# Patient Record
Sex: MALE | Race: WHITE | NOT HISPANIC OR LATINO | Employment: OTHER | URBAN - METROPOLITAN AREA
[De-identification: names, ages, dates, MRNs, and addresses within clinical notes are randomized per-mention and may not be internally consistent; named-entity substitution may affect disease eponyms.]

---

## 2017-04-28 ENCOUNTER — OFFICE VISIT - HEALTHEAST (OUTPATIENT)
Dept: FAMILY MEDICINE | Facility: CLINIC | Age: 67
End: 2017-04-28

## 2017-04-28 DIAGNOSIS — H61.23 CERUMINOSIS, BILATERAL: ICD-10-CM

## 2017-04-28 DIAGNOSIS — R73.09 OTHER ABNORMAL GLUCOSE: ICD-10-CM

## 2017-04-28 DIAGNOSIS — N40.0 BPH (BENIGN PROSTATIC HYPERPLASIA): ICD-10-CM

## 2017-04-28 DIAGNOSIS — Z00.00 PHYSICAL EXAM: ICD-10-CM

## 2017-04-28 DIAGNOSIS — H16.139 ACTINIC KERATITIS: ICD-10-CM

## 2017-04-28 DIAGNOSIS — M50.30 DEGENERATIVE CERVICAL DISC: ICD-10-CM

## 2017-04-28 LAB
CHOLEST SERPL-MCNC: 189 MG/DL
FASTING STATUS PATIENT QL REPORTED: YES
HDLC SERPL-MCNC: 73 MG/DL
LDLC SERPL CALC-MCNC: 105 MG/DL
PSA SERPL-MCNC: 1.6 NG/ML (ref 0–4.5)
TRIGL SERPL-MCNC: 54 MG/DL

## 2017-04-28 ASSESSMENT — MIFFLIN-ST. JEOR: SCORE: 1361.81

## 2017-05-01 LAB — HCV AB SERPL QL IA: NEGATIVE

## 2017-05-03 ENCOUNTER — COMMUNICATION - HEALTHEAST (OUTPATIENT)
Dept: FAMILY MEDICINE | Facility: CLINIC | Age: 67
End: 2017-05-03

## 2017-06-06 ENCOUNTER — OFFICE VISIT - HEALTHEAST (OUTPATIENT)
Dept: FAMILY MEDICINE | Facility: CLINIC | Age: 67
End: 2017-06-06

## 2017-06-06 DIAGNOSIS — M79.644 THUMB PAIN, RIGHT: ICD-10-CM

## 2017-06-06 ASSESSMENT — MIFFLIN-ST. JEOR: SCORE: 1364.99

## 2017-06-21 ENCOUNTER — HOSPITAL ENCOUNTER (OUTPATIENT)
Dept: RADIOLOGY | Facility: CLINIC | Age: 67
Discharge: HOME | End: 2017-06-21

## 2017-06-21 DIAGNOSIS — M79.644 THUMB PAIN, RIGHT: ICD-10-CM

## 2017-06-22 ENCOUNTER — COMMUNICATION - HEALTHEAST (OUTPATIENT)
Dept: FAMILY MEDICINE | Facility: CLINIC | Age: 67
End: 2017-06-22

## 2017-06-26 ENCOUNTER — COMMUNICATION - HEALTHEAST (OUTPATIENT)
Dept: FAMILY MEDICINE | Facility: CLINIC | Age: 67
End: 2017-06-26

## 2017-06-28 ENCOUNTER — OFFICE VISIT - HEALTHEAST (OUTPATIENT)
Dept: FAMILY MEDICINE | Facility: CLINIC | Age: 67
End: 2017-06-28

## 2017-06-28 DIAGNOSIS — M79.644 THUMB PAIN, RIGHT: ICD-10-CM

## 2017-06-28 ASSESSMENT — MIFFLIN-ST. JEOR: SCORE: 1356.82

## 2017-07-10 ENCOUNTER — RECORDS - HEALTHEAST (OUTPATIENT)
Dept: ADMINISTRATIVE | Facility: OTHER | Age: 67
End: 2017-07-10

## 2017-07-17 ENCOUNTER — RECORDS - HEALTHEAST (OUTPATIENT)
Dept: ADMINISTRATIVE | Facility: OTHER | Age: 67
End: 2017-07-17

## 2017-09-28 ENCOUNTER — AMBULATORY - HEALTHEAST (OUTPATIENT)
Dept: NURSING | Facility: CLINIC | Age: 67
End: 2017-09-28

## 2017-12-06 ENCOUNTER — COMMUNICATION - HEALTHEAST (OUTPATIENT)
Dept: TELEHEALTH | Facility: CLINIC | Age: 67
End: 2017-12-06

## 2017-12-06 ENCOUNTER — OFFICE VISIT - HEALTHEAST (OUTPATIENT)
Dept: FAMILY MEDICINE | Facility: CLINIC | Age: 67
End: 2017-12-06

## 2017-12-06 DIAGNOSIS — D48.9 NEOPLASM OF UNCERTAIN BEHAVIOR: ICD-10-CM

## 2017-12-06 DIAGNOSIS — R10.31 GROIN PAIN, RIGHT: ICD-10-CM

## 2017-12-11 LAB
LAB AP CHARGES (HE HISTORICAL CONVERSION): NORMAL
PATH REPORT.COMMENTS IMP SPEC: NORMAL
PATH REPORT.FINAL DX SPEC: NORMAL
PATH REPORT.GROSS SPEC: NORMAL
PATH REPORT.MICROSCOPIC SPEC OTHER STN: NORMAL
PATH REPORT.RELEVANT HX SPEC: NORMAL
RESULT FLAG (HE HISTORICAL CONVERSION): NORMAL

## 2017-12-12 ENCOUNTER — COMMUNICATION - HEALTHEAST (OUTPATIENT)
Dept: FAMILY MEDICINE | Facility: CLINIC | Age: 67
End: 2017-12-12

## 2017-12-14 ENCOUNTER — HOSPITAL ENCOUNTER (OUTPATIENT)
Dept: CT IMAGING | Facility: CLINIC | Age: 67
Discharge: HOME | End: 2017-12-14
Attending: FAMILY MEDICINE

## 2017-12-14 DIAGNOSIS — R10.31 GROIN PAIN, RIGHT: ICD-10-CM

## 2017-12-15 ENCOUNTER — COMMUNICATION - HEALTHEAST (OUTPATIENT)
Dept: FAMILY MEDICINE | Facility: CLINIC | Age: 67
End: 2017-12-15

## 2018-04-23 ENCOUNTER — OFFICE VISIT - HEALTHEAST (OUTPATIENT)
Dept: FAMILY MEDICINE | Facility: CLINIC | Age: 68
End: 2018-04-23

## 2018-04-23 ENCOUNTER — COMMUNICATION - HEALTHEAST (OUTPATIENT)
Dept: FAMILY MEDICINE | Facility: CLINIC | Age: 68
End: 2018-04-23

## 2018-04-23 DIAGNOSIS — Z12.5 SCREENING FOR PROSTATE CANCER: ICD-10-CM

## 2018-04-23 DIAGNOSIS — M50.30 DEGENERATIVE CERVICAL DISC: ICD-10-CM

## 2018-04-23 DIAGNOSIS — Z00.00 WELLNESS EXAMINATION: ICD-10-CM

## 2018-04-23 DIAGNOSIS — H16.139 ACTINIC KERATITIS: ICD-10-CM

## 2018-04-23 DIAGNOSIS — N40.0 BPH (BENIGN PROSTATIC HYPERPLASIA): ICD-10-CM

## 2018-04-23 DIAGNOSIS — R73.09 OTHER ABNORMAL GLUCOSE: ICD-10-CM

## 2018-04-23 LAB
ALBUMIN SERPL-MCNC: 4.3 G/DL (ref 3.5–5)
ALP SERPL-CCNC: 96 U/L (ref 45–120)
ALT SERPL W P-5'-P-CCNC: 40 U/L (ref 0–45)
ANION GAP SERPL CALCULATED.3IONS-SCNC: 15 MMOL/L (ref 5–18)
AST SERPL W P-5'-P-CCNC: 32 U/L (ref 0–40)
BILIRUB SERPL-MCNC: 1 MG/DL (ref 0–1)
BUN SERPL-MCNC: 16 MG/DL (ref 8–22)
CALCIUM SERPL-MCNC: 9.9 MG/DL (ref 8.5–10.5)
CHLORIDE BLD-SCNC: 103 MMOL/L (ref 98–107)
CHOLEST SERPL-MCNC: 223 MG/DL
CO2 SERPL-SCNC: 24 MMOL/L (ref 22–31)
CREAT SERPL-MCNC: 0.82 MG/DL (ref 0.7–1.3)
FASTING STATUS PATIENT QL REPORTED: YES
GFR SERPL CREATININE-BSD FRML MDRD: >60 ML/MIN/1.73M2
GLUCOSE BLD-MCNC: 73 MG/DL (ref 70–125)
HDLC SERPL-MCNC: 83 MG/DL
LDLC SERPL CALC-MCNC: 115 MG/DL
POTASSIUM BLD-SCNC: 4.8 MMOL/L (ref 3.5–5)
PROT SERPL-MCNC: 8 G/DL (ref 6–8)
PSA SERPL-MCNC: 1.7 NG/ML (ref 0–4.5)
SODIUM SERPL-SCNC: 142 MMOL/L (ref 136–145)
TRIGL SERPL-MCNC: 126 MG/DL

## 2018-04-23 ASSESSMENT — MIFFLIN-ST. JEOR: SCORE: 1322.12

## 2018-06-04 ENCOUNTER — COMMUNICATION - HEALTHEAST (OUTPATIENT)
Dept: PEDIATRICS | Facility: CLINIC | Age: 68
End: 2018-06-04

## 2018-06-04 ENCOUNTER — AMBULATORY - HEALTHEAST (OUTPATIENT)
Dept: NURSING | Facility: CLINIC | Age: 68
End: 2018-06-04

## 2018-06-04 DIAGNOSIS — Z23 IMMUNIZATION DUE: ICD-10-CM

## 2018-08-08 ENCOUNTER — OFFICE VISIT - HEALTHEAST (OUTPATIENT)
Dept: FAMILY MEDICINE | Facility: CLINIC | Age: 68
End: 2018-08-08

## 2018-08-08 DIAGNOSIS — M25.561 RIGHT KNEE PAIN: ICD-10-CM

## 2018-09-06 ENCOUNTER — OFFICE VISIT - HEALTHEAST (OUTPATIENT)
Dept: PHYSICAL THERAPY | Facility: REHABILITATION | Age: 68
End: 2018-09-06

## 2018-09-06 DIAGNOSIS — M25.561 ACUTE PAIN OF RIGHT KNEE: ICD-10-CM

## 2018-09-06 DIAGNOSIS — M25.562 ACUTE PAIN OF LEFT KNEE: ICD-10-CM

## 2018-09-06 DIAGNOSIS — R53.1 DECREASED STRENGTH: ICD-10-CM

## 2018-09-06 DIAGNOSIS — M25.661 KNEE STIFFNESS, RIGHT: ICD-10-CM

## 2018-09-10 ENCOUNTER — OFFICE VISIT - HEALTHEAST (OUTPATIENT)
Dept: PHYSICAL THERAPY | Facility: REHABILITATION | Age: 68
End: 2018-09-10

## 2018-09-10 DIAGNOSIS — R53.1 DECREASED STRENGTH: ICD-10-CM

## 2018-09-10 DIAGNOSIS — M25.661 KNEE STIFFNESS, RIGHT: ICD-10-CM

## 2018-09-10 DIAGNOSIS — M25.562 ACUTE PAIN OF LEFT KNEE: ICD-10-CM

## 2018-09-10 DIAGNOSIS — M25.561 ACUTE PAIN OF RIGHT KNEE: ICD-10-CM

## 2018-09-19 ENCOUNTER — OFFICE VISIT - HEALTHEAST (OUTPATIENT)
Dept: PHYSICAL THERAPY | Facility: REHABILITATION | Age: 68
End: 2018-09-19

## 2018-09-19 DIAGNOSIS — M25.561 ACUTE PAIN OF RIGHT KNEE: ICD-10-CM

## 2018-09-19 DIAGNOSIS — R53.1 DECREASED STRENGTH: ICD-10-CM

## 2018-09-19 DIAGNOSIS — M25.562 ACUTE PAIN OF LEFT KNEE: ICD-10-CM

## 2018-09-19 DIAGNOSIS — M25.661 KNEE STIFFNESS, RIGHT: ICD-10-CM

## 2019-04-24 ENCOUNTER — COMMUNICATION - HEALTHEAST (OUTPATIENT)
Dept: FAMILY MEDICINE | Facility: CLINIC | Age: 69
End: 2019-04-24

## 2019-04-24 ENCOUNTER — OFFICE VISIT - HEALTHEAST (OUTPATIENT)
Dept: FAMILY MEDICINE | Facility: CLINIC | Age: 69
End: 2019-04-24

## 2019-04-24 DIAGNOSIS — Z00.00 WELLNESS EXAMINATION: ICD-10-CM

## 2019-04-24 DIAGNOSIS — R73.09 OTHER ABNORMAL GLUCOSE: ICD-10-CM

## 2019-04-24 DIAGNOSIS — N40.1 BENIGN PROSTATIC HYPERPLASIA WITH LOWER URINARY TRACT SYMPTOMS, SYMPTOM DETAILS UNSPECIFIED: ICD-10-CM

## 2019-04-24 DIAGNOSIS — I10 HYPERTENSION, UNSPECIFIED TYPE: ICD-10-CM

## 2019-04-24 DIAGNOSIS — H16.139 ACTINIC KERATITIS, UNSPECIFIED LATERALITY: ICD-10-CM

## 2019-04-24 DIAGNOSIS — M50.30 DEGENERATIVE CERVICAL DISC: ICD-10-CM

## 2019-04-24 LAB
ANION GAP SERPL CALCULATED.3IONS-SCNC: 10 MMOL/L (ref 5–18)
BUN SERPL-MCNC: 12 MG/DL (ref 8–28)
CALCIUM SERPL-MCNC: 10.4 MG/DL (ref 8.5–10.5)
CHLORIDE BLD-SCNC: 105 MMOL/L (ref 98–107)
CHOLEST SERPL-MCNC: 203 MG/DL
CO2 SERPL-SCNC: 28 MMOL/L (ref 22–31)
CREAT SERPL-MCNC: 0.94 MG/DL (ref 0.7–1.3)
FASTING STATUS PATIENT QL REPORTED: YES
GFR SERPL CREATININE-BSD FRML MDRD: >60 ML/MIN/1.73M2
GLUCOSE BLD-MCNC: 100 MG/DL (ref 70–125)
HDLC SERPL-MCNC: 88 MG/DL
LDLC SERPL CALC-MCNC: 105 MG/DL
POTASSIUM BLD-SCNC: 4.5 MMOL/L (ref 3.5–5)
SODIUM SERPL-SCNC: 143 MMOL/L (ref 136–145)
TRIGL SERPL-MCNC: 52 MG/DL

## 2019-04-24 ASSESSMENT — MIFFLIN-ST. JEOR: SCORE: 1314.18

## 2019-05-22 ENCOUNTER — OFFICE VISIT - HEALTHEAST (OUTPATIENT)
Dept: FAMILY MEDICINE | Facility: CLINIC | Age: 69
End: 2019-05-22

## 2019-05-22 DIAGNOSIS — R45.4 DIFFICULTY CONTROLLING ANGER: ICD-10-CM

## 2019-05-22 DIAGNOSIS — I10 HYPERTENSION, UNSPECIFIED TYPE: ICD-10-CM

## 2019-06-17 ENCOUNTER — OFFICE VISIT - HEALTHEAST (OUTPATIENT)
Dept: FAMILY MEDICINE | Facility: CLINIC | Age: 69
End: 2019-06-17

## 2019-06-17 DIAGNOSIS — I10 HYPERTENSION, UNSPECIFIED TYPE: ICD-10-CM

## 2019-06-17 DIAGNOSIS — R42 DIZZINESS: ICD-10-CM

## 2019-09-04 ENCOUNTER — COMMUNICATION - HEALTHEAST (OUTPATIENT)
Dept: FAMILY MEDICINE | Facility: CLINIC | Age: 69
End: 2019-09-04

## 2019-09-04 ENCOUNTER — OFFICE VISIT - HEALTHEAST (OUTPATIENT)
Dept: FAMILY MEDICINE | Facility: CLINIC | Age: 69
End: 2019-09-04

## 2019-09-04 DIAGNOSIS — I10 HYPERTENSION, UNSPECIFIED TYPE: ICD-10-CM

## 2019-09-04 LAB
ANION GAP SERPL CALCULATED.3IONS-SCNC: 10 MMOL/L (ref 5–18)
BUN SERPL-MCNC: 11 MG/DL (ref 8–28)
CALCIUM SERPL-MCNC: 10 MG/DL (ref 8.5–10.5)
CHLORIDE BLD-SCNC: 105 MMOL/L (ref 98–107)
CO2 SERPL-SCNC: 28 MMOL/L (ref 22–31)
CREAT SERPL-MCNC: 0.89 MG/DL (ref 0.7–1.3)
GFR SERPL CREATININE-BSD FRML MDRD: >60 ML/MIN/1.73M2
GLUCOSE BLD-MCNC: 164 MG/DL (ref 70–125)
POTASSIUM BLD-SCNC: 4.2 MMOL/L (ref 3.5–5)
SODIUM SERPL-SCNC: 143 MMOL/L (ref 136–145)

## 2020-04-14 ENCOUNTER — COMMUNICATION - HEALTHEAST (OUTPATIENT)
Dept: FAMILY MEDICINE | Facility: CLINIC | Age: 70
End: 2020-04-14

## 2020-05-14 ENCOUNTER — COMMUNICATION - HEALTHEAST (OUTPATIENT)
Dept: FAMILY MEDICINE | Facility: CLINIC | Age: 70
End: 2020-05-14

## 2020-05-19 ENCOUNTER — OFFICE VISIT - HEALTHEAST (OUTPATIENT)
Dept: FAMILY MEDICINE | Facility: CLINIC | Age: 70
End: 2020-05-19

## 2020-05-19 DIAGNOSIS — R41.3 MEMORY DEFICIT: ICD-10-CM

## 2020-05-19 DIAGNOSIS — I10 HYPERTENSION, UNSPECIFIED TYPE: ICD-10-CM

## 2020-05-19 ASSESSMENT — PATIENT HEALTH QUESTIONNAIRE - PHQ9: SUM OF ALL RESPONSES TO PHQ QUESTIONS 1-9: 0

## 2020-06-01 ENCOUNTER — COMMUNICATION - HEALTHEAST (OUTPATIENT)
Dept: FAMILY MEDICINE | Facility: CLINIC | Age: 70
End: 2020-06-01

## 2020-06-11 ENCOUNTER — COMMUNICATION - HEALTHEAST (OUTPATIENT)
Dept: FAMILY MEDICINE | Facility: CLINIC | Age: 70
End: 2020-06-11

## 2020-06-12 ENCOUNTER — OFFICE VISIT (OUTPATIENT)
Dept: NEUROLOGY | Facility: CLINIC | Age: 70
End: 2020-06-12
Payer: COMMERCIAL

## 2020-06-12 ENCOUNTER — RECORDS - HEALTHEAST (OUTPATIENT)
Dept: ADMINISTRATIVE | Facility: OTHER | Age: 70
End: 2020-06-12

## 2020-06-12 ENCOUNTER — TELEPHONE (OUTPATIENT)
Dept: NEUROLOGY | Facility: CLINIC | Age: 70
End: 2020-06-12

## 2020-06-12 VITALS — HEIGHT: 64 IN | BODY MASS INDEX: 24.75 KG/M2 | WEIGHT: 145 LBS

## 2020-06-12 DIAGNOSIS — R41.3 MEMORY DIFFICULTY: Primary | ICD-10-CM

## 2020-06-12 PROCEDURE — 99214 OFFICE O/P EST MOD 30 MIN: CPT | Mod: 95 | Performed by: PSYCHIATRY & NEUROLOGY

## 2020-06-12 RX ORDER — LISINOPRIL 10 MG/1
10 TABLET ORAL DAILY
COMMUNITY
Start: 2019-09-04 | End: 2021-08-02 | Stop reason: DRUGHIGH

## 2020-06-12 ASSESSMENT — MIFFLIN-ST. JEOR: SCORE: 1323.72

## 2020-06-12 NOTE — NURSING NOTE
Pt. wife Araceli will be present for appointment  Computer video visit  [email or web address]  Chief Complaint   Patient presents with     Memory Loss     Zoe Patel on 6/12/2020 at 9:02 AM

## 2020-06-12 NOTE — TELEPHONE ENCOUNTER
I left message at patient home number asking he return call to discuss proceedure for scheduling testing Dr. Montenegro ordered after appointment today.  Zoe Patel on 6/12/2020 at 10:55 AM

## 2020-06-12 NOTE — PATIENT INSTRUCTIONS
We will check blood work for conditions that can cause memory difficulties.  We will also plan an MRI scan of the brain to make sure there is been no evidence of stroke, hemorrhage or mass lesion that might cause memory difficulties and behavioral changes.  I do have a concern that your alcohol consumption could be playing a role from a behavioral standpoint and also memory and should discuss this with Dr. Wu.  We will arrange a follow-up evaluation in approximately 2-3 weeks time after the above testing has been done and we will see if we can do a virtual formal memory test.

## 2020-06-12 NOTE — LETTER
6/12/2020         RE: Nate North  [address] [facility] [place] [address]        Dear Colleague,    Thank you for referring your patient, Nate North, to the [facility] NEUROLOGY [place]. Please see a copy of my visit note below.        Nate North  Age:69 year old  MRN [id number]  PCP Juan Wu    Consult requested by: Juan Wu  Regarding: Memory difficulties    Allergies: Patient has no known allergies.  Medications:  Current Outpatient Medications   Medication Sig Dispense Refill     lisinopril (ZESTRIL) 10 MG tablet Take 10 mg by mouth daily          History of Present Illness: A video encounter was attempted but was unable to make a connection.  Therefore, a telephone encounter was done today in lieu of office visit due to the COVID-19 pandemic.  Patient was agreeable to this type of encounter.  This is a 69-year-old male seen at the request of Dr. Wu regarding memory and behavioral issues.  Patient had been seeing a therapist at [facility] regarding anger issues and depression.  Therapist thought he did not have depression problems.  They saw another therapist (patient and his wife) and his wife had reported to him there were personality changes that have occurred and he can fly off the handle quite readily and has anxiety.  Patient does report memory difficulties.  He does need to take notes.  He does not get lost driving.  He has not had any difficulty with paying of bills.  He did report that he had had a concern that he may not have paid the property taxes but had paid it early.  He has had difficulty with doing taxes which he was able to do in the past.  There have been no hallucinations.  He does report getting a headache every morning.  No loss of vision or double vision.  There are no symptoms of a REM behavior disorder by patient report.  No weakness or numbness problems.  No speech or swallowing difficulties.  No difficulty with walking.  He  does have urinary frequency going 6 times a day, but no incontinence.  There is no family history of dementia.  There is no history of head injuries.  No history of meningitis or encephalitis.  There is no history of stroke.  He had worked at  for a 35-year period of time as a .  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  He does have significant alcohol intake.  He does report drinking a beer for lunch.  He reports drinking 3 highballs between 3 and 6 PM and then a Manhattan or a tim martini at bedtime.  His wife has told him that the changes that have occurred of had a significant impact upon their marriage.  No apparent apnea from 1 patient reports at night.        PAST ILLNESSES:   Medical: Active Problems    Problem Noted Date   Hypertension 04/24/2019   Overview:     Dx April 2019  Several values high  Lisinopril, May 2019       Actinic keratitis 04/27/2016   Overview:     Dx April 2016  Left forehead     Degenerative cervical disc     Overview:     Per records-don't have MRI report (not sure if done)  Left arm numbness-couple x per week       BPH (benign prostatic hyperplasia)     Overview:     Frequent urination, some urgency, loss of stream no nocturia       Prediabetes     Overview:     fasting 107, 100   Medical History    Medical History Date Comments   Anxiety   Work related. Minor attacks lasting 10-15 seconds. Substantial improvement since detention. Sense of disconnectedness          Surgical:  Surgical History    Surgery Date Site/Laterality Comments   ROTATOR CUFF REPAIR 1/1/2007 - 12/31/2007 Right      INGUINAL HERNIA REPAIR   Right      VASECTOMY 1/1/1997 - 12/31/1997         SOCIAL:   Social History     Socioeconomic History     Marital status:      Spouse name: Not on file     Number of children: Not on file     Years of education: Not on file     Highest education level: Not on file   Occupational History     Not on  file   Social Needs     Financial resource strain: Not on file     Food insecurity     Worry: Not on file     Inability: Not on file     Transportation needs     Medical: Not on file     Non-medical: Not on file   Tobacco Use     Smoking status: Current Some Day Smoker     Types: Cigars     Smokeless tobacco: Never Used     Tobacco comment: 1 cigar per year   Substance and Sexual Activity     Alcohol use: Yes     Comment: Daily     Drug use: Never     Sexual activity: Not on file   Lifestyle     Physical activity     Days per week: Not on file     Minutes per session: Not on file     Stress: Not on file   Relationships     Social connections     Talks on phone: Not on file     Gets together: Not on file     Attends Yarsanism service: Not on file     Active member of club or organization: Not on file     Attends meetings of clubs or organizations: Not on file     Relationship status: Not on file     Intimate partner violence     Fear of current or ex partner: Not on file     Emotionally abused: Not on file     Physically abused: Not on file     Forced sexual activity: Not on file   Other Topics Concern     Parent/sibling w/ CABG, MI or angioplasty before 65F 55M? Not Asked   Social History Narrative     Not on file     Employment: Retired.  Had worked for [employer] for 35-year period of time at work part-time until 2015.    FAMILY HISTORY: Father lived to be 93 and  of renal failure.  Mother lived to be 75 and  of ovarian cancer.  Family History   Problem Relation Age of Onset     Cancer Mother      :                        REVIEW OF SYSTEMS  Constitutional: Negative for fever, chills, weight loss/weight gain.  Skin: Had an actinic keratoses on his left forehead.  HEENT: No loss of vision or double vision.  Positive hearing loss and tinnitus  Respiratory: Negative shortness of breath or cough  Cardiovascular: No chest pain or rapid heart rate.  Gastrointestinal: No abdominal pain, blood in stool,  diarrhea/constipation  Genitourinary: Urinary frequency-6 times a day.  No incontinence  Musculoskeletal: Does have pain in both shoulders.  Neurologic: See above.  Psychiatric: Question of depression by patient report.  History of anxiety  Hematologic/Lymphatic/Immunologic: Negative.  Endocrine: No polydipsia.  No hot/cold intolerance        PHYSICAL EXAMINATION: Unable- telephone encounter    :     NEUROLOGIC EXAMINATION:  Mental Status: Oriented x3.  Can tell me the name of the president and the governor.  He can spell world forward and backwards.  He could repeat a phrase.  His speech sounded fluent.  Registers 3/3 objects.  Remembered 3/3 objects at 10 minutes time.            IMPRESSION: Memory difficulties/behavioral issues.  One concern we discussed is that he does have a significant amount of alcohol consumption and he should discuss this with Dr. Wu.  His short-term memory was normal on testing and is able to I am going to have him get an MRI scan of the we will also plan to check lab work in terms of a B12, TSH, ammonia level and a thiamine level.  We will plan follow-up after that testing has been done concerns could include the possibility of a frontal temporal dementia, given description of behavioral changes.  He does not have any visual hallucinations or symptoms of the room savior disorder these.  Neuropsychometric testing may be another consideration will first see what the above studies demonstrate.  We will plan follow-up after having the above testing done  Start of telephone encounter: 10 AM  End of encounter: 10:29 AM  Ger Montenegro MD, MD    Again, thank you for allowing me to participate in the care of your patient.        Sincerely,        Ger Montenegro MD, MD

## 2020-06-15 ENCOUNTER — RECORDS - HEALTHEAST (OUTPATIENT)
Dept: ADMINISTRATIVE | Facility: OTHER | Age: 70
End: 2020-06-15

## 2020-06-15 NOTE — TELEPHONE ENCOUNTER
I left message with patients wife that I would mail copy of orders to patient's home and asked he call back if there are any questions.  Zoe Patel on 6/15/2020 at 12:38 PM

## 2020-06-15 NOTE — PROGRESS NOTES
Nate North  Age:69 year old  MRN [id number]  PCP Juan Wu    Consult requested by: Juan Wu  Regarding: Memory difficulties    Allergies: Patient has no known allergies.  Medications:  Current Outpatient Medications   Medication Sig Dispense Refill     lisinopril (ZESTRIL) 10 MG tablet Take 10 mg by mouth daily          History of Present Illness: A video encounter was attempted but was unable to make a connection.  Therefore, a telephone encounter was done today in lieu of office visit due to the COVID-19 pandemic.  Patient was agreeable to this type of encounter.  This is a 69-year-old male seen at the request of Dr. Wu regarding memory and behavioral issues.  Patient had been seeing a therapist at [facility] regarding anger issues and depression.  Therapist thought he did not have depression problems.  They saw another therapist (patient and his wife) and his wife had reported to him there were personality changes that have occurred and he can fly off the handle quite readily and has anxiety.  Patient does report memory difficulties.  He does need to take notes.  He does not get lost driving.  He has not had any difficulty with paying of bills.  He did report that he had had a concern that he may not have paid the property taxes but had paid it early.  He has had difficulty with doing taxes which he was able to do in the past.  There have been no hallucinations.  He does report getting a headache every morning.  No loss of vision or double vision.  There are no symptoms of a REM behavior disorder by patient report.  No weakness or numbness problems.  No speech or swallowing difficulties.  No difficulty with walking.  He does have urinary frequency going 6 times a day, but no incontinence.  There is no family history of dementia.  There is no history of head injuries.  No history of meningitis or encephalitis.  There is no history of stroke.  He had worked at [employer] for a 35-year  period of time as a .  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  He does have significant alcohol intake.  He does report drinking a beer for lunch.  He reports drinking 3 highballs between 3 and 6 PM and then a Manhattan or a tim martini at bedtime.  His wife has told him that the changes that have occurred of had a significant impact upon their marriage.  No apparent apnea from 1 patient reports at night.        PAST ILLNESSES:   Medical: Active Problems    Problem Noted Date   Hypertension 04/24/2019   Overview:     Dx April 2019  Several values high  Lisinopril, May 2019       Actinic keratitis 04/27/2016   Overview:     Dx April 2016  Left forehead     Degenerative cervical disc     Overview:     Per records-don't have MRI report (not sure if done)  Left arm numbness-couple x per week       BPH (benign prostatic hyperplasia)     Overview:     Frequent urination, some urgency, loss of stream no nocturia       Prediabetes     Overview:     fasting 107, 100   Medical History    Medical History Date Comments   Anxiety   Work related. Minor attacks lasting 10-15 seconds. Substantial improvement since senior living. Sense of disconnectedness          Surgical:  Surgical History    Surgery Date Site/Laterality Comments   ROTATOR CUFF REPAIR 1/1/2007 - 12/31/2007 Right      INGUINAL HERNIA REPAIR   Right      VASECTOMY 1/1/1997 - 12/31/1997         SOCIAL:   Social History     Socioeconomic History     Marital status:      Spouse name: Not on file     Number of children: Not on file     Years of education: Not on file     Highest education level: Not on file   Occupational History     Not on file   Social Needs     Financial resource strain: Not on file     Food insecurity     Worry: Not on file     Inability: Not on file     Transportation needs     Medical: Not on file     Non-medical: Not on file   Tobacco Use     Smoking status: Current Some  Day Smoker     Types: Cigars     Smokeless tobacco: Never Used     Tobacco comment: 1 cigar per year   Substance and Sexual Activity     Alcohol use: Yes     Comment: Daily     Drug use: Never     Sexual activity: Not on file   Lifestyle     Physical activity     Days per week: Not on file     Minutes per session: Not on file     Stress: Not on file   Relationships     Social connections     Talks on phone: Not on file     Gets together: Not on file     Attends [employer] service: Not on file     Active member of club or organization: Not on file     Attends meetings of clubs or organizations: Not on file     Relationship status: Not on file     Intimate partner violence     Fear of current or ex partner: Not on file     Emotionally abused: Not on file     Physically abused: Not on file     Forced sexual activity: Not on file   Other Topics Concern     Parent/sibling w/ CABG, MI or angioplasty before 65F 55M? Not Asked   Social History Narrative     Not on file     Employment: Retired.  Had worked for [employer] for 35-year period of time at work part-time until 2015.    FAMILY HISTORY: Father lived to be 93 and  of renal failure.  Mother lived to be 75 and  of ovarian cancer.  Family History   Problem Relation Age of Onset     Cancer Mother      :                        REVIEW OF SYSTEMS  Constitutional: Negative for fever, chills, weight loss/weight gain.  Skin: Had an actinic keratoses on his left forehead.  HEENT: No loss of vision or double vision.  Positive hearing loss and tinnitus  Respiratory: Negative shortness of breath or cough  Cardiovascular: No chest pain or rapid heart rate.  Gastrointestinal: No abdominal pain, blood in stool, diarrhea/constipation  Genitourinary: Urinary frequency-6 times a day.  No incontinence  Musculoskeletal: Does have pain in both shoulders.  Neurologic: See above.  Psychiatric: Question of depression by patient report.  History of anxiety  Hematologic/Lymphatic/Immunologic:  Negative.  Endocrine: No polydipsia.  No hot/cold intolerance        PHYSICAL EXAMINATION: Unable- telephone encounter    :     NEUROLOGIC EXAMINATION:  Mental Status: Oriented x3.  Can tell me the name of the president and the governor.  He can spell world forward and backwards.  He could repeat a phrase.  His speech sounded fluent.  Registers 3/3 objects.  Remembered 3/3 objects at 10 minutes time.            IMPRESSION: Memory difficulties/behavioral issues.  One concern we discussed is that he does have a significant amount of alcohol consumption and he should discuss this with Dr. Wu.  His short-term memory was normal on testing and is able to I am going to have him get an MRI scan of the we will also plan to check lab work in terms of a B12, TSH, ammonia level and a thiamine level.  We will plan follow-up after that testing has been done concerns could include the possibility of a frontal temporal dementia, given description of behavioral changes.  He does not have any visual hallucinations or symptoms of the room savior disorder these.  Neuropsychometric testing may be another consideration will first see what the above studies demonstrate.  We will plan follow-up after having the above testing done  Start of telephone encounter: 10 AM  End of encounter: 10:29 AM  Ger Montenegro MD, MD

## 2020-06-25 ENCOUNTER — HOSPITAL ENCOUNTER (OUTPATIENT)
Dept: MRI IMAGING | Facility: CLINIC | Age: 70
Discharge: HOME | End: 2020-06-25

## 2020-06-25 DIAGNOSIS — R41.3 MEMORY DIFFICULTY: ICD-10-CM

## 2020-06-25 LAB
CREAT BLD-MCNC: 0.9 MG/DL (ref 0.7–1.3)
GFR SERPL CREATININE-BSD FRML MDRD: >60 ML/MIN/1.73M2

## 2020-06-30 ENCOUNTER — AMBULATORY - HEALTHEAST (OUTPATIENT)
Dept: LAB | Facility: CLINIC | Age: 70
End: 2020-06-30

## 2020-06-30 DIAGNOSIS — R41.3 MEMORY LOSS: ICD-10-CM

## 2020-06-30 LAB
AMMONIA PLAS-SCNC: 31 UMOL/L (ref 11–35)
T4 FREE SERPL-MCNC: 0.9 NG/DL (ref 0.7–1.8)
TSH SERPL DL<=0.005 MIU/L-ACNC: 0.95 UIU/ML (ref 0.3–5)
VIT B12 SERPL-MCNC: 303 PG/ML (ref 213–816)

## 2020-07-02 LAB — VIT B1 PYROPHOSHATE BLD-SCNC: 85 NMOL/L (ref 70–180)

## 2020-07-03 ENCOUNTER — COMMUNICATION - HEALTHEAST (OUTPATIENT)
Dept: FAMILY MEDICINE | Facility: CLINIC | Age: 70
End: 2020-07-03

## 2020-07-20 ENCOUNTER — DOCUMENTATION ONLY (OUTPATIENT)
Dept: OTHER | Facility: CLINIC | Age: 70
End: 2020-07-20

## 2020-07-20 ENCOUNTER — AMBULATORY - HEALTHEAST (OUTPATIENT)
Dept: OTHER | Facility: CLINIC | Age: 70
End: 2020-07-20

## 2020-07-24 ENCOUNTER — OFFICE VISIT (OUTPATIENT)
Dept: NEUROLOGY | Facility: CLINIC | Age: 70
End: 2020-07-24
Payer: COMMERCIAL

## 2020-07-24 ENCOUNTER — RECORDS - HEALTHEAST (OUTPATIENT)
Dept: ADMINISTRATIVE | Facility: OTHER | Age: 70
End: 2020-07-24

## 2020-07-24 VITALS — WEIGHT: 145 LBS | BODY MASS INDEX: 24.75 KG/M2 | HEIGHT: 64 IN

## 2020-07-24 DIAGNOSIS — R41.3 MEMORY DIFFICULTY: Primary | ICD-10-CM

## 2020-07-24 PROCEDURE — 99213 OFFICE O/P EST LOW 20 MIN: CPT | Mod: 95 | Performed by: PSYCHIATRY & NEUROLOGY

## 2020-07-24 ASSESSMENT — MIFFLIN-ST. JEOR: SCORE: 1323.72

## 2020-07-24 ASSESSMENT — PAIN SCALES - GENERAL: PAINLEVEL: NO PAIN (0)

## 2020-07-24 NOTE — PROGRESS NOTES
"    Nate North  69 year old  MRN:[id number]  PCP: Juan Wu  No ref. provider found    No Known Allergies    Current Outpatient Medications   Medication Sig Dispense Refill     lisinopril (ZESTRIL) 10 MG tablet Take 10 mg by mouth daily        Nate North is a 69 year old male who is being evaluated via a billable video visit.      The patient has been notified of following:     \"This video visit will be conducted via a call between you and your physician/provider. We have found that certain health care needs can be provided without the need for an in-person physical exam.  This service lets us provide the care you need with a video conversation.  If a prescription is necessary we can send it directly to your pharmacy.  If lab work is needed we can place an order for that and you can then stop by our lab to have the test done at a later time.    Video visits are billed at different rates depending on your insurance coverage.  Please reach out to your insurance provider with any questions.    If during the course of the call the physician/provider feels a video visit is not appropriate, you will not be charged for this service.\"    Patient has given verbal consent for Video visit?Yes   How would you like to obtain your AVS? Mail a copy  If you are dropped from the video visit, the video invite should be resent to: Text to cell phone: [phone]  Will anyone else be joining your video visit? No        Video-Visit Details    Type of service:  Video Visit    Video Start Time: 3:58 PM  Video End Time: 4:15 PM    Originating Location (pt. Location): Home    Distant Location (provider location):  [facility] NEUROLOGY [place]     Platform used for Video Visit: [facility]          CHIEF COMPLAINT: Follow-up after MRI scan and lab results.    HISTORY SINCE LAST VISIT: A video visit was done with Mr. North today in follow-up.  His laboratory studies showed his ammonia level to be normal at 31.  " Thiamine level was normal at 85.  B12 level was in the low normal range at 303.TSH was normal at 0.95, with a free T4 that was normal at 0.9.  We went over his MRI results.  I showed them the actual images.  The MRI of the brain appears normal for age.  There is some mild atrophy but does not appear to be in excess for patient of his age.  We again talked about his alcohol consumption.  He will typically have a glass of wine or beer at lunchtime and then may have the afternoon and then a drink at bedtime.  The amount he drinks in the afternoon he reports may be variable.  We discussed the concern of excessive alcohol use can have upon memory function.    PAST MEDICAL HISTORY:   Problem Noted Date   Hypertension 04/24/2019   Overview:     Dx April 2019  Several values high  Lisinopril, May 2019       Actinic keratitis 04/27/2016   Overview:     Dx April 2016  Left forehead     Degenerative cervical disc     Overview:     Per records-don't have MRI report (not sure if done)  Left arm numbness-couple x per week       BPH (benign prostatic hyperplasia)     Overview:     Frequent urination, some urgency, loss of stream no nocturia       Prediabetes     Overview:     fasting 107, 100   Medical History     Medical History Date Comments   Anxiety   Work related. Minor attacks lasting 10-15 seconds. Substantial improvement since snf. Sense of disconnectedness             Surgical:  Surgical History     Surgery Date Site/Laterality Comments   ROTATOR CUFF REPAIR 1/1/2007 - 12/31/2007 Right      INGUINAL HERNIA REPAIR   Right      VASECTOMY 1/1/1997 - 12/31/1997          FAMILY HISTORY:  Family History   Problem Relation Age of Onset     Cancer Mother        SOCIAL HISTORY:  Social History     Socioeconomic History     Marital status:      Spouse name: Not on file     Number of children: Not on file     Years of education: Not on file     Highest education level: Not on file   Occupational History     Not on file    Social Needs     Financial resource strain: Not on file     Food insecurity     Worry: Not on file     Inability: Not on file     Transportation needs     Medical: Not on file     Non-medical: Not on file   Tobacco Use     Smoking status: Current Some Day Smoker     Types: Cigars     Smokeless tobacco: Never Used     Tobacco comment: 1 cigar per year   Substance and Sexual Activity     Alcohol use: Yes     Comment: Daily     Drug use: Never     Sexual activity: Not on file   Lifestyle     Physical activity     Days per week: Not on file     Minutes per session: Not on file     Stress: Not on file   Relationships     Social connections     Talks on phone: Not on file     Gets together: Not on file     Attends Muslim service: Not on file     Active member of club or organization: Not on file     Attends meetings of clubs or organizations: Not on file     Relationship status: Not on file     Intimate partner violence     Fear of current or ex partner: Not on file     Emotionally abused: Not on file     Physically abused: Not on file     Forced sexual activity: Not on file   Other Topics Concern     Parent/sibling w/ CABG, MI or angioplasty before 65F 55M? Not Asked   Social History Narrative     Not on file       REVIEW OF SYSTEMS:    Constitutional: No fever, chills, weight loss/weight gain.    Eyes: No loss of vision or double vision.  Ears/Nose/Throat: Positive hearing loss and tinnitus.     Neurologic: See above.  Psychiatric: Positive history of anxiety.  Hematologic/Lymphatic/Immunologic:    Endocrine:          PHYSICAL EXAMINATION:    General appearance: No acute distress.      NEUROLOGIC EXAMINATION:    Oriented x3.  Speech is fluent.  Normal naming and repetition.  Extraocular movements were full.  Visual fields are full.  Face symmetric.  Tongue midline.  No drift of arms noted.  Normal finger tapping and rapid alternating movement.      IMPRESSION: Impression: Memory difficulties.  No clear lab  abnormalities that would explain memory difficulties nor findings on MRI.  We will plan to go ahead with neuropsychometric testing, which may be done virtually.  We will plan follow-up 2-3 weeks time after memory testing has been done.  Again discussed trying to avoid excessive use of alcohol.            Ger Montenegro MD

## 2020-07-24 NOTE — LETTER
"    7/24/2020         RE: Nate North  [address] [facility] [place] [address]        Dear Colleague,    Thank you for referring your patient, Nate North, to the [facility]. Please see a copy of my visit note below.        Nate North  69 year old  MRN:[id number]  PCP: Juan Wu  No ref. provider found    No Known Allergies    Current Outpatient Medications   Medication Sig Dispense Refill     lisinopril (ZESTRIL) 10 MG tablet Take 10 mg by mouth daily        Nate North is a 69 year old male who is being evaluated via a billable video visit.      The patient has been notified of following:     \"This video visit will be conducted via a call between you and your physician/provider. We have found that certain health care needs can be provided without the need for an in-person physical exam.  This service lets us provide the care you need with a video conversation.  If a prescription is necessary we can send it directly to your pharmacy.  If lab work is needed we can place an order for that and you can then stop by our lab to have the test done at a later time.    Video visits are billed at different rates depending on your insurance coverage.  Please reach out to your insurance provider with any questions.    If during the course of the call the physician/provider feels a video visit is not appropriate, you will not be charged for this service.\"    Patient has given verbal consent for Video visit?Yes   How would you like to obtain your AVS? Mail a copy  If you are dropped from the video visit, the video invite should be resent to: Text to cell phone: [phone]  Will anyone else be joining your video visit? No        Video-Visit Details    Type of service:  Video Visit    Video Start Time: 3:58 PM  Video End Time: 4:15 PM    Originating Location (pt. Location): Home    Distant Location (provider location):  [facility]     Platform used " for Video Visit: [facility]          CHIEF COMPLAINT: Follow-up after MRI scan and lab results.    HISTORY SINCE LAST VISIT: A video visit was done with Mr. North today in follow-up.  His laboratory studies showed his ammonia level to be normal at 31.  Thiamine level was normal at 85.  B12 level was in the low normal range at 303.TSH was normal at 0.95, with a free T4 that was normal at 0.9.  We went over his MRI results.  I showed them the actual images.  The MRI of the brain appears normal for age.  There is some mild atrophy but does not appear to be in excess for patient of his age.  We again talked about his alcohol consumption.  He will typically have a glass of wine or beer at lunchtime and then may have the afternoon and then a drink at bedtime.  The amount he drinks in the afternoon he reports may be variable.  We discussed the concern of excessive alcohol use can have upon memory function.    PAST MEDICAL HISTORY:   Problem Noted Date   Hypertension 04/24/2019   Overview:     Dx April 2019  Several values high  Lisinopril, May 2019       Actinic keratitis 04/27/2016   Overview:     Dx April 2016  Left forehead     Degenerative cervical disc     Overview:     Per records-don't have MRI report (not sure if done)  Left arm numbness-couple x per week       BPH (benign prostatic hyperplasia)     Overview:     Frequent urination, some urgency, loss of stream no nocturia       Prediabetes     Overview:     fasting 107, 100   Medical History     Medical History Date Comments   Anxiety   Work related. Minor attacks lasting 10-15 seconds. Substantial improvement since prison. Sense of disconnectedness             Surgical:  Surgical History     Surgery Date Site/Laterality Comments   ROTATOR CUFF REPAIR 1/1/2007 - 12/31/2007 Right      INGUINAL HERNIA REPAIR   Right      VASECTOMY 1/1/1997 - 12/31/1997          FAMILY HISTORY:  Family History   Problem Relation Age of Onset     Cancer Mother        SOCIAL  HISTORY:  Social History     Socioeconomic History     Marital status:      Spouse name: Not on file     Number of children: Not on file     Years of education: Not on file     Highest education level: Not on file   Occupational History     Not on file   Social Needs     Financial resource strain: Not on file     Food insecurity     Worry: Not on file     Inability: Not on file     Transportation needs     Medical: Not on file     Non-medical: Not on file   Tobacco Use     Smoking status: Current Some Day Smoker     Types: Cigars     Smokeless tobacco: Never Used     Tobacco comment: 1 cigar per year   Substance and Sexual Activity     Alcohol use: Yes     Comment: Daily     Drug use: Never     Sexual activity: Not on file   Lifestyle     Physical activity     Days per week: Not on file     Minutes per session: Not on file     Stress: Not on file   Relationships     Social connections     Talks on phone: Not on file     Gets together: Not on file     Attends Jainism service: Not on file     Active member of club or organization: Not on file     Attends meetings of clubs or organizations: Not on file     Relationship status: Not on file     Intimate partner violence     Fear of current or ex partner: Not on file     Emotionally abused: Not on file     Physically abused: Not on file     Forced sexual activity: Not on file   Other Topics Concern     Parent/sibling w/ CABG, MI or angioplasty before 65F 55M? Not Asked   Social History Narrative     Not on file       REVIEW OF SYSTEMS:    Constitutional: No fever, chills, weight loss/weight gain.    Eyes: No loss of vision or double vision.  Ears/Nose/Throat: Positive hearing loss and tinnitus.     Neurologic: See above.  Psychiatric: Positive history of anxiety.  Hematologic/Lymphatic/Immunologic:    Endocrine:          PHYSICAL EXAMINATION:    General appearance: No acute distress.      NEUROLOGIC EXAMINATION:    Oriented x3.  Speech is fluent.  Normal naming  and repetition.  Extraocular movements were full.  Visual fields are full.  Face symmetric.  Tongue midline.  No drift of arms noted.  Normal finger tapping and rapid alternating movement.      IMPRESSION: Impression: Memory difficulties.  No clear lab abnormalities that would explain memory difficulties nor findings on MRI.  We will plan to go ahead with neuropsychometric testing, which may be done virtually.  We will plan follow-up 2-3 weeks time after memory testing has been done.  Again discussed trying to avoid excessive use of alcohol.            Ger Montenegro MD    Again, thank you for allowing me to participate in the care of your patient.        Sincerely,        Ger Montenegro MD, MD

## 2020-07-24 NOTE — NURSING NOTE
Chief Complaint   Patient presents with     Neurologic Problem     MRI and blood test f/u   Video smart phone [phone]

## 2020-07-24 NOTE — PATIENT INSTRUCTIONS
Your MRI of the brain appeared normal for age.  Your laboratory studies looking for causes of memory difficulties was normal.  As we discussed, I think the next step would be is to do formal neuropsychometric testing to evaluate your memory and brain function.  I will have my nurse contact you with seeing how to get that done.  We will plan follow-up approximately 2-3 weeks after those studies have been completed.

## 2020-07-27 ENCOUNTER — AMBULATORY - HEALTHEAST (OUTPATIENT)
Dept: NEUROLOGY | Facility: CLINIC | Age: 70
End: 2020-07-27

## 2020-07-27 DIAGNOSIS — R41.3 MEMORY DIFFICULTY: ICD-10-CM

## 2020-07-31 ENCOUNTER — OFFICE VISIT - HEALTHEAST (OUTPATIENT)
Dept: FAMILY MEDICINE | Facility: CLINIC | Age: 70
End: 2020-07-31

## 2020-07-31 DIAGNOSIS — N40.1 BENIGN PROSTATIC HYPERPLASIA WITH LOWER URINARY TRACT SYMPTOMS, SYMPTOM DETAILS UNSPECIFIED: ICD-10-CM

## 2020-07-31 DIAGNOSIS — H16.139 ACTINIC KERATITIS, UNSPECIFIED LATERALITY: ICD-10-CM

## 2020-07-31 DIAGNOSIS — M25.512 CHRONIC PAIN OF BOTH SHOULDERS: ICD-10-CM

## 2020-07-31 DIAGNOSIS — M25.511 CHRONIC PAIN OF BOTH SHOULDERS: ICD-10-CM

## 2020-07-31 DIAGNOSIS — Z12.5 SCREENING FOR PROSTATE CANCER: ICD-10-CM

## 2020-07-31 DIAGNOSIS — M50.30 DEGENERATIVE CERVICAL DISC: ICD-10-CM

## 2020-07-31 DIAGNOSIS — Z00.00 WELLNESS EXAMINATION: ICD-10-CM

## 2020-07-31 DIAGNOSIS — I10 HYPERTENSION, UNSPECIFIED TYPE: ICD-10-CM

## 2020-07-31 DIAGNOSIS — Z23 NEED FOR VACCINATION: ICD-10-CM

## 2020-07-31 DIAGNOSIS — F32.A DEPRESSION, UNSPECIFIED DEPRESSION TYPE: ICD-10-CM

## 2020-07-31 DIAGNOSIS — R41.3 MEMORY LOSS: ICD-10-CM

## 2020-07-31 DIAGNOSIS — G89.29 CHRONIC PAIN OF BOTH SHOULDERS: ICD-10-CM

## 2020-07-31 DIAGNOSIS — R73.09 OTHER ABNORMAL GLUCOSE: ICD-10-CM

## 2020-07-31 LAB
ANION GAP SERPL CALCULATED.3IONS-SCNC: 12 MMOL/L (ref 5–18)
BUN SERPL-MCNC: 16 MG/DL (ref 8–28)
CALCIUM SERPL-MCNC: 10.3 MG/DL (ref 8.5–10.5)
CHLORIDE BLD-SCNC: 103 MMOL/L (ref 98–107)
CHOLEST SERPL-MCNC: 196 MG/DL
CO2 SERPL-SCNC: 25 MMOL/L (ref 22–31)
CREAT SERPL-MCNC: 0.85 MG/DL (ref 0.7–1.3)
FASTING STATUS PATIENT QL REPORTED: YES
GFR SERPL CREATININE-BSD FRML MDRD: >60 ML/MIN/1.73M2
GLUCOSE BLD-MCNC: 86 MG/DL (ref 70–125)
HDLC SERPL-MCNC: 85 MG/DL
LDLC SERPL CALC-MCNC: 100 MG/DL
POTASSIUM BLD-SCNC: 5.4 MMOL/L (ref 3.5–5)
PSA SERPL-MCNC: 1.3 NG/ML (ref 0–6.5)
SODIUM SERPL-SCNC: 140 MMOL/L (ref 136–145)
TRIGL SERPL-MCNC: 56 MG/DL

## 2020-07-31 ASSESSMENT — ANXIETY QUESTIONNAIRES
1. FEELING NERVOUS, ANXIOUS, OR ON EDGE: NOT AT ALL
2. NOT BEING ABLE TO STOP OR CONTROL WORRYING: NOT AT ALL

## 2020-07-31 ASSESSMENT — MIFFLIN-ST. JEOR: SCORE: 1291.95

## 2020-08-01 ENCOUNTER — COMMUNICATION - HEALTHEAST (OUTPATIENT)
Dept: FAMILY MEDICINE | Facility: CLINIC | Age: 70
End: 2020-08-01

## 2020-08-10 ENCOUNTER — HOSPITAL ENCOUNTER (OUTPATIENT)
Dept: NEUROLOGY | Facility: CLINIC | Age: 70
Setting detail: THERAPIES SERIES
Discharge: STILL ADMITTED OR ADMINISTRATIVE | End: 2020-08-10
Attending: CLINICAL NEUROPSYCHOLOGIST

## 2020-08-10 DIAGNOSIS — G31.84 MILD COGNITIVE IMPAIRMENT, SO STATED: ICD-10-CM

## 2020-08-10 DIAGNOSIS — F43.23 ADJUSTMENT DISORDER WITH MIXED ANXIETY AND DEPRESSED MOOD: ICD-10-CM

## 2020-08-17 ENCOUNTER — OFFICE VISIT - HEALTHEAST (OUTPATIENT)
Dept: PHYSICAL THERAPY | Facility: REHABILITATION | Age: 70
End: 2020-08-17

## 2020-08-17 DIAGNOSIS — M25.552 HIP PAIN, BILATERAL: ICD-10-CM

## 2020-08-17 DIAGNOSIS — M25.512 PAIN OF BOTH SHOULDER JOINTS: ICD-10-CM

## 2020-08-17 DIAGNOSIS — M62.81 GENERALIZED MUSCLE WEAKNESS: ICD-10-CM

## 2020-08-17 DIAGNOSIS — M25.551 HIP PAIN, BILATERAL: ICD-10-CM

## 2020-08-17 DIAGNOSIS — M25.511 PAIN OF BOTH SHOULDER JOINTS: ICD-10-CM

## 2020-08-25 ENCOUNTER — OFFICE VISIT - HEALTHEAST (OUTPATIENT)
Dept: PHYSICAL THERAPY | Facility: REHABILITATION | Age: 70
End: 2020-08-25

## 2020-08-25 DIAGNOSIS — M25.551 HIP PAIN, BILATERAL: ICD-10-CM

## 2020-08-25 DIAGNOSIS — M25.512 PAIN OF BOTH SHOULDER JOINTS: ICD-10-CM

## 2020-08-25 DIAGNOSIS — M25.552 HIP PAIN, BILATERAL: ICD-10-CM

## 2020-08-25 DIAGNOSIS — M25.511 PAIN OF BOTH SHOULDER JOINTS: ICD-10-CM

## 2020-08-25 DIAGNOSIS — M62.81 GENERALIZED MUSCLE WEAKNESS: ICD-10-CM

## 2020-08-28 ENCOUNTER — OFFICE VISIT - HEALTHEAST (OUTPATIENT)
Dept: PHYSICAL THERAPY | Facility: REHABILITATION | Age: 70
End: 2020-08-28

## 2020-08-28 ENCOUNTER — OFFICE VISIT (OUTPATIENT)
Dept: NEUROLOGY | Facility: CLINIC | Age: 70
End: 2020-08-28
Payer: COMMERCIAL

## 2020-08-28 ENCOUNTER — RECORDS - HEALTHEAST (OUTPATIENT)
Dept: ADMINISTRATIVE | Facility: OTHER | Age: 70
End: 2020-08-28

## 2020-08-28 VITALS — WEIGHT: 140 LBS | BODY MASS INDEX: 24.03 KG/M2

## 2020-08-28 DIAGNOSIS — R41.3 MEMORY DIFFICULTY: Primary | ICD-10-CM

## 2020-08-28 DIAGNOSIS — M62.81 GENERALIZED MUSCLE WEAKNESS: ICD-10-CM

## 2020-08-28 DIAGNOSIS — M25.551 HIP PAIN, BILATERAL: ICD-10-CM

## 2020-08-28 DIAGNOSIS — M25.511 PAIN OF BOTH SHOULDER JOINTS: ICD-10-CM

## 2020-08-28 DIAGNOSIS — M25.552 HIP PAIN, BILATERAL: ICD-10-CM

## 2020-08-28 DIAGNOSIS — M25.512 PAIN OF BOTH SHOULDER JOINTS: ICD-10-CM

## 2020-08-28 PROCEDURE — 99214 OFFICE O/P EST MOD 30 MIN: CPT | Mod: 95 | Performed by: PSYCHIATRY & NEUROLOGY

## 2020-08-28 NOTE — NURSING NOTE
Chief Complaint   Patient presents with     Neurologic Problem     F/u for previous testing     Video Visit     Text to video [phone]     Jonathan Sotelo MA on 8/28/2020 at 9:52 AM     no abdominal pain, no bloating, no constipation, no diarrhea, no nausea and no vomiting.

## 2020-08-28 NOTE — PATIENT INSTRUCTIONS
As we discussed, your neuropsychometric testing went very well and there was no evidence of an underlying dementia process.  Your reduction of alcohol use can also provide some benefit over the long-term.  I would continue with the sertraline, as appears to have had a benefit for you.  I do not think we need to do any further evaluation neurologically at this time.  We can see you back on an as-needed basis if problems were to arise.

## 2020-08-28 NOTE — LETTER
"    8/28/2020         RE: Nate North  [address] [facility] [place] [address]        Dear Colleague,    Thank you for referring your patient, Nate North, to the [facility] [place]. Please see a copy of my visit note below.        Nate North  69 year old  MRN:[id number]  PCP: Juan Wu  No ref. provider found    No Known Allergies    Current Outpatient Medications   Medication Sig Dispense Refill     lisinopril (ZESTRIL) 10 MG tablet Take 10 mg by mouth daily        sertraline (ZOLOFT) 50 MG tablet Take 50 mg by mouth daily     Nate North is a 69 year old male who is being evaluated via a billable video visit.      The patient has been notified of following:     \"This video visit will be conducted via a call between you and your physician/provider. We have found that certain health care needs can be provided without the need for an in-person physical exam.  This service lets us provide the care you need with a video conversation.  If a prescription is necessary we can send it directly to your pharmacy.  If lab work is needed we can place an order for that and you can then stop by our lab to have the test done at a later time.    Video visits are billed at different rates depending on your insurance coverage.  Please reach out to your insurance provider with any questions.    If during the course of the call the physician/provider feels a video visit is not appropriate, you will not be charged for this service.\"    Patient has given verbal consent for Video visit? {YES  How would you like to obtain your AVS? Mail a copy  If you are dropped from the video visit, the video invite should be resent to: [phone]  Will anyone else be joining your video visit? No        Video-Visit Details    Type of service:  Video Visit    Video Start Time:10:10AM  Video End Time: 10:36 AM    Originating Location (pt. Location): Home    Distant Location (provider location):  [facility]" "[place] [facility] [place]     Platform used for Video Visit: Mayra Montenegro MD      CHIEF COMPLAINT: Follow-up of neuropsych testing    HISTORY SINCE LAST VISIT: I saw Mr. Guy in a video visit today.  We went over the neuropsychometric testing that had been done on 8/10/2020.  There was no evidence to suggest a dementia.  His premorbid intellectual functioning was described as being in the above average to superior range.  His testing demonstrated similar levels of function at this time.  His retention of verbal memory was 73- 100% with delayed recall.  There were concerns for his alcohol use and also an adjustment disorder.  He had been started on sertraline, 50 mg daily by Dr. Wu 4 weeks ago.  He does believe it has helped him.  He feels he is more \"rational \"in terms of dealing with stressful situations.  Because he put at the hair on the back of his neck does not seem to rise up during those times.  He has cut back his alcohol consumption approximately 25%.  He has decreased the amount of alcohol in his drinks to approximately two thirds of what it was before in addition to the decreased amount of drinking.  He has been sleeping better.  He reports he did have a sleep study done 2 to 3 years ago that did not show apnea.  He had been having shoulder pain which has been improved with physical therapy such that he might have 1 time a night waking up.  He will typically go to bathroom if that is the case.  There is no loss of vision or double vision.  No speech or swallowing problems.  No acting out dreams that is been noted.      PAST MEDICAL HISTORY:   Problem Noted Date   Hypertension 04/24/2019   Overview:     Dx April 2019  Several values high  Lisinopril, May 2019       Actinic keratitis 04/27/2016   Overview:     Dx April 2016  Left forehead     Degenerative cervical disc     Overview:     Per records-don't have MRI report (not sure if done)  Left arm numbness-couple x per week     "   BPH (benign prostatic hyperplasia)     Overview:     Frequent urination, some urgency, loss of stream no nocturia       Prediabetes     Overview:     fasting 107, 100   Medical History     Medical History Date Comments   Anxiety   Work related. Minor attacks lasting 10-15 seconds. Substantial improvement since custodial. Sense of disconnectedness            FAMILY HISTORY:  Family History   Problem Relation Age of Onset     Cancer Mother        SOCIAL HISTORY:  Social History     Socioeconomic History     Marital status:      Spouse name: Not on file     Number of children: Not on file     Years of education: Not on file     Highest education level: Not on file   Occupational History     Not on file   Social Needs     Financial resource strain: Not on file     Food insecurity     Worry: Not on file     Inability: Not on file     Transportation needs     Medical: Not on file     Non-medical: Not on file   Tobacco Use     Smoking status: Current Some Day Smoker     Types: Cigars     Smokeless tobacco: Never Used     Tobacco comment: 1 cigar per year   Substance and Sexual Activity     Alcohol use: Yes     Comment: Daily     Drug use: Never     Sexual activity: Not on file   Lifestyle     Physical activity     Days per week: Not on file     Minutes per session: Not on file     Stress: Not on file   Relationships     Social connections     Talks on phone: Not on file     Gets together: Not on file     Attends Adventism service: Not on file     Active member of club or organization: Not on file     Attends meetings of clubs or organizations: Not on file     Relationship status: Not on file     Intimate partner violence     Fear of current or ex partner: Not on file     Emotionally abused: Not on file     Physically abused: Not on file     Forced sexual activity: Not on file   Other Topics Concern     Parent/sibling w/ CABG, MI or angioplasty before 65F 55M? Not Asked   Social History Narrative     Not on file        REVIEW OF SYSTEMS:    Constitutional: No fever, chills, weight loss/weight gain  Eyes: No loss of vision or double vision  Respiratory: No shortness of breath.  Genitourinary: 1x/ night nocturia    Neurologic: See above.  Psychiatric: Feels better with being on sertraline  Hematologic/Lymphatic/Immunologic:           PHYSICAL EXAMINATION:    General appearance: No acute distress.  Appropriately attired and groomed      NEUROLOGIC EXAMINATION:    Oriented x3.  Speech is fluent.  Normal naming and repetition.  Can tell me the name of the president and the governor.  Can spell world forward and backwards.  He register 3 of 3 objects.  Remembered 3/3 objects at 7 minutes time.    Cranial Nerves: Extraocular movements are full.  Visual fields are full.  Face symmetric.  Tongue midline    Motor: No drift of upper extremities    Co-ordination: Normal finger-nose-finger.  Normal finger tapping.      Gait: Normal        IMPRESSION: 1.  Memory difficulties.  We discussed its good news and neuropsychometric testing showed no evidence of a dementia process.  We discussed trying to continue decreasing his amount of alcohol can provide benefit, but he does not think he be able to stop drinking altogether.  We discussed in general its thought that for older individuals, max of 1 drink a day is best.  He will continue with sertraline which has had a positive benefit for him.  He and his wife had gone through counseling before, but he mentioned this had been discontinued as they appear to be doing well.  We discussed if he were to run into problems doing individual psychotherapy could be of benefit.  We discussed we can see him back on an as-needed basis if there were to be problems that were to arise.            Ger Montenegro MD    Again, thank you for allowing me to participate in the care of your patient.        Sincerely,        Ger Montenegro MD, MD

## 2020-08-31 ENCOUNTER — OFFICE VISIT - HEALTHEAST (OUTPATIENT)
Dept: PHYSICAL THERAPY | Facility: REHABILITATION | Age: 70
End: 2020-08-31

## 2020-08-31 DIAGNOSIS — M25.551 HIP PAIN, BILATERAL: ICD-10-CM

## 2020-08-31 DIAGNOSIS — M25.511 PAIN OF BOTH SHOULDER JOINTS: ICD-10-CM

## 2020-08-31 DIAGNOSIS — M25.512 PAIN OF BOTH SHOULDER JOINTS: ICD-10-CM

## 2020-08-31 DIAGNOSIS — M62.81 GENERALIZED MUSCLE WEAKNESS: ICD-10-CM

## 2020-08-31 DIAGNOSIS — M25.552 HIP PAIN, BILATERAL: ICD-10-CM

## 2020-09-09 ENCOUNTER — OFFICE VISIT - HEALTHEAST (OUTPATIENT)
Dept: PHYSICAL THERAPY | Facility: REHABILITATION | Age: 70
End: 2020-09-09

## 2020-09-09 DIAGNOSIS — M25.551 HIP PAIN, BILATERAL: ICD-10-CM

## 2020-09-09 DIAGNOSIS — M25.511 PAIN OF BOTH SHOULDER JOINTS: ICD-10-CM

## 2020-09-09 DIAGNOSIS — M25.512 PAIN OF BOTH SHOULDER JOINTS: ICD-10-CM

## 2020-09-09 DIAGNOSIS — M25.552 HIP PAIN, BILATERAL: ICD-10-CM

## 2020-09-09 DIAGNOSIS — M62.81 GENERALIZED MUSCLE WEAKNESS: ICD-10-CM

## 2020-09-18 ENCOUNTER — COMMUNICATION - HEALTHEAST (OUTPATIENT)
Dept: FAMILY MEDICINE | Facility: CLINIC | Age: 70
End: 2020-09-18

## 2020-09-18 ENCOUNTER — OFFICE VISIT - HEALTHEAST (OUTPATIENT)
Dept: FAMILY MEDICINE | Facility: CLINIC | Age: 70
End: 2020-09-18

## 2020-09-18 DIAGNOSIS — F32.A DEPRESSION, UNSPECIFIED DEPRESSION TYPE: ICD-10-CM

## 2020-09-18 DIAGNOSIS — R19.5 LOOSE STOOLS: ICD-10-CM

## 2020-09-18 DIAGNOSIS — R41.3 MEMORY LOSS: ICD-10-CM

## 2020-09-18 DIAGNOSIS — I10 HYPERTENSION, UNSPECIFIED TYPE: ICD-10-CM

## 2020-09-18 LAB
ANION GAP SERPL CALCULATED.3IONS-SCNC: 11 MMOL/L (ref 5–18)
BUN SERPL-MCNC: 15 MG/DL (ref 8–28)
CALCIUM SERPL-MCNC: 9.8 MG/DL (ref 8.5–10.5)
CHLORIDE BLD-SCNC: 100 MMOL/L (ref 98–107)
CO2 SERPL-SCNC: 26 MMOL/L (ref 22–31)
CREAT SERPL-MCNC: 0.91 MG/DL (ref 0.7–1.3)
GFR SERPL CREATININE-BSD FRML MDRD: >60 ML/MIN/1.73M2
GLUCOSE BLD-MCNC: 183 MG/DL (ref 70–125)
POTASSIUM BLD-SCNC: 4.1 MMOL/L (ref 3.5–5)
SODIUM SERPL-SCNC: 137 MMOL/L (ref 136–145)

## 2020-09-18 ASSESSMENT — ANXIETY QUESTIONNAIRES
7. FEELING AFRAID AS IF SOMETHING AWFUL MIGHT HAPPEN: NOT AT ALL
2. NOT BEING ABLE TO STOP OR CONTROL WORRYING: NOT AT ALL
6. BECOMING EASILY ANNOYED OR IRRITABLE: SEVERAL DAYS
GAD7 TOTAL SCORE: 3
IF YOU CHECKED OFF ANY PROBLEMS ON THIS QUESTIONNAIRE, HOW DIFFICULT HAVE THESE PROBLEMS MADE IT FOR YOU TO DO YOUR WORK, TAKE CARE OF THINGS AT HOME, OR GET ALONG WITH OTHER PEOPLE: NOT DIFFICULT AT ALL
4. TROUBLE RELAXING: NOT AT ALL
3. WORRYING TOO MUCH ABOUT DIFFERENT THINGS: NOT AT ALL
1. FEELING NERVOUS, ANXIOUS, OR ON EDGE: SEVERAL DAYS
5. BEING SO RESTLESS THAT IT IS HARD TO SIT STILL: SEVERAL DAYS

## 2020-09-18 ASSESSMENT — PATIENT HEALTH QUESTIONNAIRE - PHQ9: SUM OF ALL RESPONSES TO PHQ QUESTIONS 1-9: 2

## 2020-10-23 ENCOUNTER — AMBULATORY - HEALTHEAST (OUTPATIENT)
Dept: NURSING | Facility: CLINIC | Age: 70
End: 2020-10-23

## 2020-10-27 ENCOUNTER — COMMUNICATION - HEALTHEAST (OUTPATIENT)
Dept: FAMILY MEDICINE | Facility: CLINIC | Age: 70
End: 2020-10-27

## 2020-10-27 DIAGNOSIS — I10 HYPERTENSION, UNSPECIFIED TYPE: ICD-10-CM

## 2021-01-13 ENCOUNTER — COMMUNICATION - HEALTHEAST (OUTPATIENT)
Dept: FAMILY MEDICINE | Facility: CLINIC | Age: 71
End: 2021-01-13

## 2021-03-12 ENCOUNTER — AMBULATORY - HEALTHEAST (OUTPATIENT)
Dept: NURSING | Facility: CLINIC | Age: 71
End: 2021-03-12

## 2021-03-23 ENCOUNTER — COMMUNICATION - HEALTHEAST (OUTPATIENT)
Dept: FAMILY MEDICINE | Facility: CLINIC | Age: 71
End: 2021-03-23

## 2021-03-23 DIAGNOSIS — F32.A DEPRESSION, UNSPECIFIED DEPRESSION TYPE: ICD-10-CM

## 2021-04-02 ENCOUNTER — AMBULATORY - HEALTHEAST (OUTPATIENT)
Dept: NURSING | Facility: CLINIC | Age: 71
End: 2021-04-02

## 2021-05-26 ASSESSMENT — PATIENT HEALTH QUESTIONNAIRE - PHQ9: SUM OF ALL RESPONSES TO PHQ QUESTIONS 1-9: 0

## 2021-05-27 ASSESSMENT — PATIENT HEALTH QUESTIONNAIRE - PHQ9: SUM OF ALL RESPONSES TO PHQ QUESTIONS 1-9: 2

## 2021-05-28 ASSESSMENT — ANXIETY QUESTIONNAIRES: GAD7 TOTAL SCORE: 3

## 2021-05-28 NOTE — PROGRESS NOTES
Assessment and Plan:       1. Wellness examination  Patient overall has very good health habits.    2. Benign prostatic hyperplasia   Symptoms of decreased stream and nocturia but have been relatively stable over time.    3. Degenerative cervical disc  Patient with a history of neck changes and left radicular symptoms fairly stable over time.    4. Prediabetes  One value over 100    5. Hypertension  New onset several elevated blood pressure values.  - Lipid Cascade  - Basic Metabolic Panel    6. Actinic keratitis  Has noted on the face before    PLAN:  1.  Discussed high blood pressure, medications versus recheck watchful waiting and so forth.  After discussion the patient is going to check his blood pressure several times per week if he is consistently getting numbers greater than 140/90 after month or so then I will have him return for discussion of medication.  2.  Patient already has good health habits but he is going to try to increase his walking.  3.  Laboratory studies as above.  4.  See the patient back in a month of blood pressure values remain elevated otherwise yearly for wellness examinations.        The patient's current medical problems were reviewed.      The following health maintenance schedule was reviewed with the patient and provided in printed form in the after visit summary:   Health Maintenance   Topic Date Due     ZOSTER VACCINES (3 of 3) 07/30/2018     INFLUENZA VACCINE RULE BASED (1) 08/01/2018     FALL RISK ASSESSMENT  04/23/2019     TD 18+ HE  09/23/2019     COLONOSCOPY  07/21/2021     ADVANCE DIRECTIVES DISCUSSED WITH PATIENT  04/23/2023     PNEUMOCOCCAL POLYSACCHARIDE VACCINE AGE 65 AND OVER  Completed     PNEUMOCOCCAL CONJUGATE VACCINE FOR ADULTS (PCV13 OR PREVNAR)  Completed        Subjective:   Chief Complaint: Nate North is an 68 y.o. male here for an Annual Wellness visit.     HPI:  Patient's blood pressure is 163/89 and 181/97 in the office today. He notes that he gets  his blood pressure checked when he donates platelets and last time it was 150/78 which was the highest it had ever been. He states that his blood pressure is typically in the 140s. He occasionally gets lightheaded, especially after donating platelets. He gets an eye exam annually. He mentions that his left arm gets numb during the night when he sleeps. Once he moves his arm around the numbness resolves. He denies any changes in his bladder habits. He has a lesion on the back of his neck which gets caught on his shirt sometimes. He has bad knees and has to be careful when bending over and kneeling. He denies stomach or bowel issues. He has not been getting as much exercise now that he is retired but plans to start going on more walks.     PFSH:  Family: Three of his siblings have glaucoma. His mother had ovarian cancer.     Review of Systems:  Please see above.  The rest of the review of systems are negative for all systems.    Patient Care Team:  Juan Wu MD as PCP - [facility] [place] (Generic Provider)  Giovani Bhat MD as Physician (Orthopedic Surgery)  Clarence Zheng MD as Physician (Ophthalmology)     Patient Active Problem List   Diagnosis     Degenerative cervical disc     BPH (benign prostatic hyperplasia)     Prediabetes     Actinic keratitis     Hypertension     Past Medical History:   Diagnosis Date     Anxiety     Work related. Minor attacks lasting 10-15 seconds. Substantial improvement since [facility]DC. Sense of disconnectedness       Past Surgical History:   Procedure Laterality Date     INGUINAL HERNIA REPAIR Right      ROTATOR CUFF REPAIR Right 2007     VASECTOMY  1997           Family History   Problem Relation Age of Onset     Benign prostatic hyperplasia Father      Skin cancer Father         Not Melanoma     Kidney failure Father      Ovarian cancer Mother      Glaucoma Sister      Skin cancer Brother      Glaucoma Brother      Glaucoma Sister       Social History      Socioeconomic History     Marital status:      Spouse name: Not on file     Number of children: Not on file     Years of education: Not on file     Highest education level: Not on file   Occupational History     Occupation: retired     Employer: [email or web address]     Comment: Facilities     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[employer]     Comment: No longer doing- [employer]   Social Needs     Financial resource strain: Not on file     Food insecurity:     Worry: Not on file     Inability: Not on file     Transportation needs:     Medical: Not on file     Non-medical: Not on file   Tobacco Use     Smoking status: Former Smoker     Packs/day: 0.50     Years: 5.00     Pack years: 2.50     Types: Cigarettes     Last attempt to quit: 1963     Years since quittin.0     Smokeless tobacco: Never Used     Tobacco comment: smoked a few years in high school   Substance and Sexual Activity     Alcohol use: Yes     Alcohol/week: 8.4 oz     Types: 14 Standard drinks or equivalent per week     Drug use: No     Sexual activity: Yes     Partners: Female   Lifestyle     Physical activity:     Days per week: Not on file     Minutes per session: Not on file     Stress: Not on file   Relationships     Social connections:     Talks on phone: Not on file     Gets together: Not on file     Attends Zoroastrian service: Not on file     Active member of club or organization: Not on file     Attends meetings of clubs or organizations: Not on file     Relationship status: Not on file     Intimate partner violence:     Fear of current or ex partner: Not on file     Emotionally abused: Not on file     Physically abused: Not on file     Forced sexual activity: Not on file   Other Topics Concern     Not on file   Social History Narrative    Diet-healthy        Exercise-walking 3-4x week      No current outpatient medications on file.     No current facility-administered medications for this visit.       Objective:   Vital Signs:   Visit  "Vitals  BP (!) 181/97   Pulse 79   Ht 5' 4\" (1.626 m)   Wt 144 lb (65.3 kg)   SpO2 99%   BMI 24.72 kg/m         VisionScreening:  No exam data present     PHYSICAL EXAM  Constitutional:   Reveals a healthy appearing male.  Vitals: per nursing notes.  HEENT: Right Ear: External ear normal.   Left Ear: External ear normal.   Nose: Nose normal.   Mouth/Throat: Oropharynx is clear and moist.   Eyes: Conjunctivae and EOM are normal. Pupils are equal, round, and reactive to light. Right eye exhibits no discharge. Left eye exhibits no discharge.   Neck:  Supple, no carotid bruits or adenopathy.  Back:  No spine or CVA pain.  Thorax:  No bony deformities.  Lungs: Clear to A&P without rales or wheezes.  Respiratory effort normal.  Cardiac:   Regular rate and rhythm, normal S1, S2, no murmur or gallop.  Abdomen:  Soft, active bowel sounds without bruits, mass, or tenderness.  Extremities:   No peripheral edema, pulses in the feet intact.    Skin:  No jaundice, peripheral cyanosis or lesions to suggest malignancy.  Neuro:  Alert and oriented. Cranial nerves, motor, sensory exams are intact.  No gross focal deficits.  Psychiatric:  Memory intact, mood appropriate.    ADDITIONAL HISTORY SUMMARIZED (2): None.  DECISION TO OBTAIN EXTRA INFORMATION (1): None.   RADIOLOGY TESTS (1): None.  LABS (1): Ordered labs.  MEDICINE TESTS (1): None.  INDEPENDENT REVIEW (2 each): None.     Total data points = 1    Total time was 24 minutes, greater than 50% counseling and coordinating care regarding the above issues.        Assessment Results 4/24/2019   Activities of Daily Living No help needed   Instrumental Activities of Daily Living No help needed   Mini Cog Total Score 5   Some recent data might be hidden     A Mini-Cog score of 0-2 suggests the possibility of dementia, score of 3-5 suggests no dementia    Identified Health Risks:     He is at risk for lack of exercise and has been provided with information to increase physical activity " for the benefit of his well-being.  The patient reports that he drinks more than one alcoholic drink per day but denies binge or excessive drinking. He was counseled and given information about possible harmful effects of excessive alcohol intake.  The patient was counseled and encouraged to consider modifying their diet and eating habits. He was provided with information on recommended healthy diet options.  Patient's advanced directive was discussed and I am comfortable with the patient's wishes.    By signing my name below, I, Fred Aviles, attest that this documentation has been prepared under the direction and in the presence of Dr. Juan Wu.  Electronic Signature: Mily Bradley. 4/24/2019 9:27 AM.    I, Dr. Wu, personally performed the services described in this documentation. All medical record entries made by the scribe were at my direction and in my presence. I have reviewed the chart and discharge instructions (if applicable) and agree that the record reflects my personal performance and is accurate and complete.

## 2021-05-29 NOTE — PROGRESS NOTES
ASSESSMENT:  1. Hypertension  Suboptimal control in the office but the patient is getting normotensive values at home.  - lisinopril (PRINIVIL,ZESTRIL) 10 MG tablet; Take 1 tablet (10 mg total) by mouth daily.  Dispense: 90 tablet; Refill: 0    2. Dizziness  Several month history of intermittent dizziness very brief noted in the morning, I suspect that this is related to inner ear issues and less likely related to blood pressure or the lisinopril itself.        PLAN:  1.  After discussion, continue lisinopril 10 mg daily.  2.  Patient will continue to check his blood pressure at home.  3.  Reassurance and watchful waiting about the dizziness.  4.  Follow-up in September.    No orders of the defined types were placed in this encounter.    Medications Discontinued During This Encounter   Medication Reason     lisinopril (PRINIVIL,ZESTRIL) 10 MG tablet        Return in about 3 months (around 9/17/2019) for Recheck.    CHIEF COMPLAINT:  Chief Complaint   Patient presents with     Hypertension     recheck since starting lisinopril last month      Dizziness     not sure if related to higher BP or lisinopril        SUBJECTIVE:  Nate is a 68 y.o. male who presents for hypertension management follow-up. Patient began taking 10 mg lisinopril daily around one month ago. His blood pressure is 144/82 and 144/77 in the office today. He has been checking his blood pressure at home and has been getting better values than today in the office. He mentions that for the past few months he has been experiencing dizziness when he gets up in the morning. The dizziness goes away in a few seconds after he gets his bearings.     Patient states that he has been more worried and aware of things around him when driving. He is concerned that someone could crash into him which makes him anxious.     REVIEW OF SYSTEMS:   All other systems are negative.    PFSH:  Immunization History   Administered Date(s) Administered     DT (pediatric)  2000, 10/03/2001     Influenza high dose, seasonal 2016, 2017     Influenza, Seasonal, Inj PF IIV3 10/22/2010, 2011, 2012, 2013     Influenza, inj, historic,unspecified 2008, 2009, 2009     Influenza,seasonal, Inj IIV3 2005, 2008, 2009     Pneumo Conj 13-V (2010&after) 2015     Pneumo Polysac 23-V 2016     Td, Adult, Absorbed 10/03/2001     Td,adult,historic,unspecified 10/03/2001     Tdap 2009     ZOSTER, LIVE 2013     ZOSTER, RECOMBINANT, IM 2018     Social History     Socioeconomic History     Marital status:      Spouse name: Not on file     Number of children: Not on file     Years of education: Not on file     Highest education level: Not on file   Occupational History     Occupation: retired     Employer: [employer]     Comment: Facilities     Occupation: [employer]     Comment: No longer [employer] [employer] [employer]   Social Needs     Financial resource strain: Not on file     Food insecurity:     Worry: Not on file     Inability: Not on file     Transportation needs:     Medical: Not on file     Non-medical: Not on file   Tobacco Use     Smoking status: Former Smoker     Packs/day: 0.50     Years: 5.00     Pack years: 2.50     Types: Cigarettes     Last attempt to quit: 1963     Years since quittin.1     Smokeless tobacco: Never Used     Tobacco comment: smoked a few years in high school   Substance and Sexual Activity     Alcohol use: Yes     Alcohol/week: 8.4 oz     Types: 14 Standard drinks or equivalent per week     Drug use: No     Sexual activity: Yes     Partners: Female   Lifestyle     Physical activity:     Days per week: Not on file     Minutes per session: Not on file     Stress: Not on file   Relationships     Social connections:     Talks on phone: Not on file     Gets together: Not on file     Attends Tenriism service: Not on file     Active member of club or  organization: Not on file     Attends meetings of clubs or organizations: Not on file     Relationship status: Not on file     Intimate partner violence:     Fear of current or ex partner: Not on file     Emotionally abused: Not on file     Physically abused: Not on file     Forced sexual activity: Not on file   Other Topics Concern     Not on file   Social History Narrative    Diet-healthy        Exercise-walking 3-4x week     Past Medical History:   Diagnosis Date     Anxiety     Work related. Minor attacks lasting 10-15 seconds. Substantial improvement since intermediate. Sense of disconnectedness      Family History   Problem Relation Age of Onset     Benign prostatic hyperplasia Father      Skin cancer Father         Not Melanoma     Kidney failure Father      Ovarian cancer Mother      Glaucoma Sister      Skin cancer Brother      Glaucoma Brother      Glaucoma Sister        MEDICATIONS:  Current Outpatient Medications   Medication Sig Dispense Refill     lisinopril (PRINIVIL,ZESTRIL) 10 MG tablet Take 1 tablet (10 mg total) by mouth daily. 90 tablet 0     No current facility-administered medications for this visit.        TOBACCO USE:  Social History     Tobacco Use   Smoking Status Former Smoker     Packs/day: 0.50     Years: 5.00     Pack years: 2.50     Types: Cigarettes     Last attempt to quit: 1963     Years since quittin.1   Smokeless Tobacco Never Used   Tobacco Comment    smoked a few years in high school       VITALS:  Vitals:    19 1032 19 1052   BP: 144/82 144/77   Pulse: (!) 104    SpO2: 99%    Weight: 145 lb 9.6 oz (66 kg)      Wt Readings from Last 3 Encounters:   19 145 lb 9.6 oz (66 kg)   19 146 lb (66.2 kg)   19 144 lb (65.3 kg)       PHYSICAL EXAM:  Constitutional:   Reveals a healthy appearing male.  Vitals: per nursing notes.  HEENT:  Ears:  External canals, TMs clear.    Eyes:  EOMs full, PERRL.  Lungs: Clear to A&P without rales or wheezes.   Respiratory effort normal.  Cardiac:   Regular rate and rhythm, normal S1, S2, no murmur or gallop.  Musculoskeletal: No peripheral swelling.  Neuro:  Alert and oriented. Cranial nerves, motor, sensory exams are intact.  No gross focal deficits.  Psychiatric:  Memory intact, mood appropriate.    QUALITY MEASURES:      DATA REVIEWED:  Additional History from Old Records Summarized (2):   Decision to Obtain Records (1):   Radiology Tests Summarized or Ordered (1):   Labs Reviewed or Ordered (1):   Medicine Test Summarized or Ordered (1):   Independent Review of EKG, X-RAY, or RAPID STREP (2 each):     The visit lasted a total of 20 minutes face to face with the patient. Over 50% of the time was spent counseling and educating the patient about his hypertension.    By signing my name below, I, Fred Aviles, attest that this documentation has been prepared under the direction and in the presence of Dr. Juan Wu.  Electronic Signature: Mily Bradley. 6/17/2019 10:32 AM.    I, Dr. Wu, personally performed the services described in this documentation. All medical record entries made by the scribe were at my direction and in my presence. I have reviewed the chart and discharge instructions (if applicable) and agree that the record reflects my personal performance and is accurate and complete.      Total data points: 0

## 2021-05-29 NOTE — PROGRESS NOTES
ASSESSMENT:  1. Hypertension  Patient with a diagnosis of high blood pressure, remaining high.  - lisinopril (PRINIVIL,ZESTRIL) 10 MG tablet; Take 1 tablet (10 mg total) by mouth daily.  Dispense: 30 tablet; Refill: 1    2. Difficulty controlling anger  Patient mentions some anger issues, and wonders if there could be underlying depression which is possible.        PLAN:  1.  Begin lisinopril 10 mg daily.  2.  Referral for therapy  3.  Patient to continue to check his blood pressure twice weekly or so and will see back in 3 to 4 weeks for follow-up.  Consider basic metabolic profile.       No orders of the defined types were placed in this encounter.    There are no discontinued medications.    Return in about 6 months (around 11/22/2019) for Recheck.    CHIEF COMPLAINT:  Chief Complaint   Patient presents with     Hypertension     recheck and poss discuss meds        SUBJECTIVE:  Nate is a 68 y.o. male who presents for hypertension management follow-up. Patient's blood pressure is 157/96 in the office today. He brought his own blood pressure machine to compare to our readings. His blood pressure on his machine was 159/86 in the office today. He has been checking his blood pressure twice a week and has been getting values in the 150s-170s.     Patient mentions that he thinks he might be depressed. He has been getting angry quickly which is causing some trouble in his marriage.    REVIEW OF SYSTEMS:   All other systems are negative.    PFSH:  Immunization History   Administered Date(s) Administered     DT (pediatric) 11/16/2000, 10/03/2001     Influenza high dose, seasonal 03/18/2016, 09/28/2017     Influenza, Seasonal, Inj PF IIV3 10/22/2010, 09/29/2011, 09/20/2012, 09/25/2013     Influenza, inj, historic,unspecified 12/23/2008, 09/22/2009, 09/23/2009     Influenza,seasonal, Inj IIV3 11/19/2005, 12/23/2008, 09/22/2009     Pneumo Conj 13-V (2010&after) 04/20/2015     Pneumo Polysac 23-V 04/27/2016     Td, Adult,  Absorbed 10/03/2001     Td,adult,historic,unspecified 10/03/2001     Tdap 2009     ZOSTER, LIVE 2013     ZOSTER, RECOMBINANT, IM 2018     Social History     Socioeconomic History     Marital status:      Spouse name: Not on file     Number of children: Not on file     Years of education: Not on file     Highest education level: Not on file   Occupational History     Occupation: retired     Employer: [employer]     Comment: Facilities     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[employer]     Comment: No longer [employer]   Social Needs     Financial resource strain: Not on file     Food insecurity:     Worry: Not on file     Inability: Not on file     Transportation needs:     Medical: Not on file     Non-medical: Not on file   Tobacco Use     Smoking status: Former Smoker     Packs/day: 0.50     Years: 5.00     Pack years: 2.50     Types: Cigarettes     Last attempt to quit: 1963     Years since quittin.1     Smokeless tobacco: Never Used     Tobacco comment: smoked a few years in high school   Substance and Sexual Activity     Alcohol use: Yes     Alcohol/week: 8.4 oz     Types: 14 Standard drinks or equivalent per week     Drug use: No     Sexual activity: Yes     Partners: Female   Lifestyle     Physical activity:     Days per week: Not on file     Minutes per session: Not on file     Stress: Not on file   Relationships     Social connections:     Talks on phone: Not on file     Gets together: Not on file     Attends Congregation service: Not on file     Active member of club or organization: Not on file     Attends meetings of clubs or organizations: Not on file     Relationship status: Not on file     Intimate partner violence:     Fear of current or ex partner: Not on file     Emotionally abused: Not on file     Physically abused: Not on file     Forced sexual activity: Not on file   Other Topics Concern     Not on file   Social History Narrative    Diet-healthy         Exercise-walking 3-4x week     Past Medical History:   Diagnosis Date     Anxiety     Work related. Minor attacks lasting 10-15 seconds. Substantial improvement since snf. Sense of disconnectedness      Family History   Problem Relation Age of Onset     Benign prostatic hyperplasia Father      Skin cancer Father         Not Melanoma     Kidney failure Father      Ovarian cancer Mother      Glaucoma Sister      Skin cancer Brother      Glaucoma Brother      Glaucoma Sister        MEDICATIONS:  Current Outpatient Medications   Medication Sig Dispense Refill     lisinopril (PRINIVIL,ZESTRIL) 10 MG tablet Take 1 tablet (10 mg total) by mouth daily. 30 tablet 1     No current facility-administered medications for this visit.        TOBACCO USE:  Social History     Tobacco Use   Smoking Status Former Smoker     Packs/day: 0.50     Years: 5.00     Pack years: 2.50     Types: Cigarettes     Last attempt to quit: 1963     Years since quittin.1   Smokeless Tobacco Never Used   Tobacco Comment    smoked a few years in high school       VITALS:  Vitals:    19 0854   BP: (!) 157/96   Pulse: 99   SpO2: 98%   Weight: 146 lb (66.2 kg)     Wt Readings from Last 3 Encounters:   19 146 lb (66.2 kg)   19 144 lb (65.3 kg)   18 143 lb 9.6 oz (65.1 kg)       PHYSICAL EXAM:  Constitutional:   Reveals a healthy appearing male.  Vitals: per nursing notes.  HEENT:  Ears:  External canals, TMs clear.    Eyes:  EOMs full, PERRL.  Lungs: Clear to A&P without rales or wheezes.  Respiratory effort normal.  Cardiac:   Regular rate and rhythm, normal S1, S2, no murmur or gallop.  Musculoskeletal: No peripheral swelling.  Neuro:  Alert and oriented. Cranial nerves, motor, sensory exams are intact.  No gross focal deficits.  Psychiatric:  Memory intact, mood appropriate.    QUALITY MEASURES:      DATA REVIEWED:  Additional History from Old Records Summarized (2):   Decision to Obtain Records (1):   Radiology  Tests Summarized or Ordered (1):   Labs Reviewed or Ordered (1):   Medicine Test Summarized or Ordered (1):   Independent Review of EKG, X-RAY, or RAPID STREP (2 each):     The visit lasted a total of 11 minutes face to face with the patient. Over 50% of the time was spent counseling and educating the patient about his above concerns.    By signing my name below, I, Fred Aviles, attest that this documentation has been prepared under the direction and in the presence of Dr. Juan Wu.  Electronic Signature: Mily Bradley. 5/22/2019 9:16 AM.    I, Dr. Wu, personally performed the services described in this documentation. All medical record entries made by the scribe were at my direction and in my presence. I have reviewed the chart and discharge instructions (if applicable) and agree that the record reflects my personal performance and is accurate and complete.      Total data points: 0

## 2021-05-30 VITALS — WEIGHT: 151 LBS | HEIGHT: 65 IN | BODY MASS INDEX: 25.16 KG/M2

## 2021-05-31 ENCOUNTER — RECORDS - HEALTHEAST (OUTPATIENT)
Dept: ADMINISTRATIVE | Facility: CLINIC | Age: 71
End: 2021-05-31

## 2021-05-31 VITALS — HEIGHT: 65 IN | WEIGHT: 149.9 LBS | BODY MASS INDEX: 24.97 KG/M2

## 2021-05-31 VITALS — WEIGHT: 151.7 LBS | BODY MASS INDEX: 25.27 KG/M2 | HEIGHT: 65 IN

## 2021-05-31 VITALS — BODY MASS INDEX: 24.13 KG/M2 | WEIGHT: 145 LBS

## 2021-05-31 NOTE — PROGRESS NOTES
ASSESSMENT:  1. Hypertension  Well-controlled on lisinopril, slight but tolerable cough possibly from lisinopril.  - lisinopril (PRINIVIL,ZESTRIL) 10 MG tablet; Take 1 tablet (10 mg total) by mouth daily.  Dispense: 90 tablet; Refill: 3  - Basic Metabolic Panel        PLAN:  1.  Continue lisinopril 10 mg daily, prescription renewed.  2.  Nonfasting basic metabolic profile.  3.  She can continue to have his blood pressure checked once or twice monthly.  4.  Patient would be due for a wellness examination spring 2020    Orders Placed This Encounter   Procedures     Basic Metabolic Panel     Medications Discontinued During This Encounter   Medication Reason     lisinopril (PRINIVIL,ZESTRIL) 10 MG tablet Reorder       Return in about 3 months (around 12/4/2019) for Recheck.    CHIEF COMPLAINT:  Chief Complaint   Patient presents with     Hypertension     recheck        SUBJECTIVE:  Nate is a 69 y.o. male comes in for follow-up blood pressure.  I started the patient on lisinopril in April of this year.  He has noticed a slight cough but it is minimally causing symptoms or difficulties for him, it also could be from allergies or other causes but is not enough for us to consider changing or switching medications at this time.    Patient does check his blood pressure, with the exception of one all the values are well controlled generally in the 120s or 130s systolic.    All he is feeling well, I told him that based on the numbers and the symptoms he is having I would continue him on lisinopril.    REVIEW OF SYSTEMS:      All other systems are negative.    PFSH:  Immunization History   Administered Date(s) Administered     DT (pediatric) 11/16/2000, 10/03/2001     Influenza high dose,seasonal,PF, 65+ yrs 03/18/2016, 09/28/2017     Influenza, Seasonal, Inj PF IIV3 10/22/2010, 09/29/2011, 09/20/2012, 09/25/2013     Influenza, inj, historic,unspecified 12/23/2008, 09/22/2009, 09/23/2009     Influenza,seasonal, Inj IIV3  2005, 2008, 2009     Pneumo Conj 13-V (2010&after) 2015     Pneumo Polysac 23-V 2016     Td, Adult, Absorbed 10/03/2001     Td,adult,historic,unspecified 10/03/2001     Tdap 2009     ZOSTER, LIVE 2013     ZOSTER, RECOMBINANT, IM 2018     Social History     Socioeconomic History     Marital status:      Spouse name: Not on file     Number of children: Not on file     Years of education: Not on file     Highest education level: Not on file   Occupational History     Occupation: retired     Employer: [employer]     Comment: Facilities     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meters     Comment: No longer [employer]   Social Needs     Financial resource strain: Not on file     Food insecurity:     Worry: Not on file     Inability: Not on file     Transportation needs:     Medical: Not on file     Non-medical: Not on file   Tobacco Use     Smoking status: Former Smoker     Packs/day: 0.50     Years: 5.00     Pack years: 2.50     Types: Cigarettes     Last attempt to quit: 1963     Years since quittin.3     Smokeless tobacco: Never Used     Tobacco comment: smoked a few years in high school   Substance and Sexual Activity     Alcohol use: Yes     Alcohol/week: 8.4 oz     Types: 14 Standard drinks or equivalent per week     Drug use: No     Sexual activity: Yes     Partners: Female   Lifestyle     Physical activity:     Days per week: Not on file     Minutes per session: Not on file     Stress: Not on file   Relationships     Social connections:     Talks on phone: Not on file     Gets together: Not on file     Attends Congregation service: Not on file     Active member of club or organization: Not on file     Attends meetings of clubs or organizations: Not on file     Relationship status: Not on file     Intimate partner violence:     Fear of current or ex partner: Not on file     Emotionally abused: Not on file     Physically abused: Not on file     Forced  sexual activity: Not on file   Other Topics Concern     Not on file   Social History Narrative    Diet-healthy        Exercise-walking 3-4x week     Past Medical History:   Diagnosis Date     Anxiety     Work related. Minor attacks lasting 10-15 seconds. Substantial improvement since [facility]. Sense of disconnectedness      Family History   Problem Relation Age of Onset     Benign prostatic hyperplasia Father      Skin cancer Father         Not Melanoma     Kidney failure Father      Ovarian cancer Mother      Glaucoma Sister      Skin cancer Brother      Glaucoma Brother      Glaucoma Sister        MEDICATIONS:  Current Outpatient Medications   Medication Sig Dispense Refill     lisinopril (PRINIVIL,ZESTRIL) 10 MG tablet Take 1 tablet (10 mg total) by mouth daily. 90 tablet 3     No current facility-administered medications for this visit.        TOBACCO USE:  Social History     Tobacco Use   Smoking Status Former Smoker     Packs/day: 0.50     Years: 5.00     Pack years: 2.50     Types: Cigarettes     Last attempt to quit: 1963     Years since quittin.3   Smokeless Tobacco Never Used   Tobacco Comment    smoked a few years in high school       VITALS:  Vitals:    19 0906   BP: 135/81   Pulse: (!) 101   SpO2: 99%   Weight: 144 lb 9.6 oz (65.6 kg)     Wt Readings from Last 3 Encounters:   19 144 lb 9.6 oz (65.6 kg)   19 145 lb 9.6 oz (66 kg)   19 146 lb (66.2 kg)       PHYSICAL EXAM:  Constitutional:   Reveals a male who appears overall healthy.  Vitals: per nursing notes.  Musculoskeletal: No peripheral swelling.  Neuro:  Alert and oriented. Cranial nerves, motor, sensory exams are intact.  No gross focal deficits.  Psychiatric:  Memory intact, mood appropriate.    QUALITY MEASURES:      DATA REVIEWED:

## 2021-06-01 VITALS — BODY MASS INDEX: 23.99 KG/M2 | WEIGHT: 144 LBS | HEIGHT: 65 IN

## 2021-06-01 VITALS — BODY MASS INDEX: 24.27 KG/M2 | WEIGHT: 143.6 LBS

## 2021-06-01 NOTE — PATIENT INSTRUCTIONS - HE
We will see you next spring for a wellness examination, continue to check blood pressure as long as it is consistently less than 140/90 then I am satisfied.  I will also comment on laboratory studies when available.

## 2021-06-02 ENCOUNTER — RECORDS - HEALTHEAST (OUTPATIENT)
Dept: ADMINISTRATIVE | Facility: CLINIC | Age: 71
End: 2021-06-02

## 2021-06-03 VITALS — HEIGHT: 64 IN | WEIGHT: 144 LBS | BODY MASS INDEX: 24.59 KG/M2

## 2021-06-03 VITALS
WEIGHT: 144.6 LBS | OXYGEN SATURATION: 99 % | BODY MASS INDEX: 24.82 KG/M2 | HEART RATE: 101 BPM | SYSTOLIC BLOOD PRESSURE: 135 MMHG | DIASTOLIC BLOOD PRESSURE: 81 MMHG

## 2021-06-03 VITALS — WEIGHT: 145.6 LBS | BODY MASS INDEX: 24.99 KG/M2

## 2021-06-03 VITALS — BODY MASS INDEX: 25.06 KG/M2 | WEIGHT: 146 LBS

## 2021-06-04 VITALS
SYSTOLIC BLOOD PRESSURE: 160 MMHG | DIASTOLIC BLOOD PRESSURE: 76 MMHG | BODY MASS INDEX: 23.75 KG/M2 | HEIGHT: 64 IN | HEART RATE: 82 BPM | WEIGHT: 139.1 LBS

## 2021-06-04 VITALS — WEIGHT: 145 LBS | BODY MASS INDEX: 24.89 KG/M2 | DIASTOLIC BLOOD PRESSURE: 70 MMHG | SYSTOLIC BLOOD PRESSURE: 140 MMHG

## 2021-06-05 VITALS
SYSTOLIC BLOOD PRESSURE: 129 MMHG | DIASTOLIC BLOOD PRESSURE: 74 MMHG | BODY MASS INDEX: 23.69 KG/M2 | HEART RATE: 92 BPM | WEIGHT: 138 LBS

## 2021-06-07 NOTE — TELEPHONE ENCOUNTER
Upcoming Appointment Question  When is the appointment: 4/27/20  What is your appointment for?: AWV  Who is your appointment scheduled with?: PCP only  What is your question/concern?: Patient is asking about his AWV  He states he feels fine. No issues.  Is fine rescheduling for summer.  He does use lisinopril but it has refills until about 8/1/20.  Please advise.   Okay to leave a detailed message?: Yes [phone]

## 2021-06-08 NOTE — TELEPHONE ENCOUNTER
SS spoke to patient. Provided pt with clinic's phone #. He will call to schedule, Jazmin is ok with VV or phone visits.    [facility] [phone]

## 2021-06-08 NOTE — TELEPHONE ENCOUNTER
Who is calling:  Patient   Reason for Call:  Caller is checking on the status of referral to Neurology. Caller would really like a call back to schedule for that appointment.   Date of last appointment with primary care:   Okay to leave a detailed message: Yes

## 2021-06-08 NOTE — TELEPHONE ENCOUNTER
SS conf called with [facility] to [facility]. Clinic is currently only doing video visits at this time, no start date of face to face. Patient would like to confirm with Dr. Wu that he is ok with a video consult with neurology.     Please advise, SS can notify patient.

## 2021-06-08 NOTE — TELEPHONE ENCOUNTER
He does need to make a virtual appointment for this, I need to discuss further and get more information, and he also needs follow-up anyway for blood pressure and other medical issues.

## 2021-06-08 NOTE — PROGRESS NOTES
"Nate North is a 69 y.o. male who is being evaluated via a billable telephone visit.      The patient has been notified of following:     \"This telephone visit will be conducted via a call between you and your physician/provider. We have found that certain health care needs can be provided without the need for a physical exam.  This service lets us provide the care you need with a short phone conversation.  If a prescription is necessary we can send it directly to your pharmacy.  If lab work is needed we can place an order for that and you can then stop by our lab to have the test done at a later time.    Telephone visits are billed at different rates depending on your insurance coverage. During this emergency period, for some insurers they may be billed the same as an in-person visit.  Please reach out to your insurance provider with any questions.    If during the course of the call the physician/provider feels a telephone visit is not appropriate, you will not be charged for this service.\"    Patient has given verbal consent to a Telephone visit? Yes    What phone number would you like to be contacted at?  See Above     Patient would like to receive their AVS by AVS Preference: Ashish.    Additional provider notes: This is a telephone call that began at 2:13 PM and ended at 2:37 PM.    I spoke with the patient and his wife.  The patient is calling specifically because he is requesting memory evaluation.  The patient reports that his wife concurs that he does have a temper and that he can get very irritated and angry very quickly.  He is gone through individual and more recently couples therapy.  His therapist apparently does not feel that depression is playing a major role in any potential concern about memory and that his therapist specifically mentioned to the patient that she thought he should be evaluated for any memory issue specifically dementia.    The patient reports that for quite some time since " he is retired back around 2006 or so he feels like his memory has not been as good and probably has gotten worse lately.  He however denies that he has any trouble doing math calculations or driving or other tasks or skills but his wife reports that she feels that his memory has worsened.    His wife further adds that she feels that his irritability and anger issues have also worsened.    There is not a family history of dementia, I discussed with the patient that I think given the concerns raised I think it is extremely reasonable for the patient to be seen and evaluated for any possible memory issues and I will put that referral in.    Otherwise, there has not been any change in his overall health.    Assessment/Plan:  1. Hypertension  Apparently well controlled.    2. Memory deficit  Subjective sensation of memory concerns, it is unclear what extent this is age-related versus mild cognitive impairment or more serious memory issue, it is also unclear to what extent memory concerns could be playing a role in some mental and emotional health issues.  - Ambulatory referral to Neurology    PLAN:  1.  [facility] for work-up of memory.  2.  At this time, the patient's therapy is on hold though it could be resumed in the future.  3.  Patient would be due for a physical sometime this fall.    Phone call duration:  24 minutes    Mary Ellen Howard CMA

## 2021-06-08 NOTE — TELEPHONE ENCOUNTER
Referral Request  Type of referral: Neurology  Who s requesting: Patient  Why the request:   Patient states [facility] recommended he have Memory and Baseline Cognition Testing.  Have you been seen for this request: No:  Appointment Offered:  declined  Does patient have a preference on a group/provider? Provider to recommend a Neurology Provider.  Okay to leave a detailed message?  Yes     Please reach out to patient to schedule.  Thank you

## 2021-06-10 NOTE — PROGRESS NOTES
[facility] Rehabilitation Daily Progress     Patient Name: Nate North  Date: 2020  Visit #: -, prn  POC Dates: 20-20  Referral Diagnosis: Degenerative cervical disc  Referring provider: Juan Wu MD  Visit Diagnosis:     ICD-10-CM    1. Pain of both shoulder joints  M25.511     M25.512    2. Hip pain, bilateral  M25.551     M25.552    3. Generalized muscle weakness  M62.81          Assessment:     Patient returns for his first follow-up.  He feels most of his pain in hips have resolved by using his orthotics in his shoes again.  His shoulders still wake him at night but not as often as it did. Added one more shoulder stretch and a few strengthening exercises to help with stabilization.   Patient is appropriate to continue with skilled physical therapy intervention, as indicated by initial plan of care.    From evaluation:  Patient presents with chronic mechanical and muscular type pain in his hips and shoulders, which only hurt when he tries to sleep.  Once he gets out of bed his pain resolves.  He got a new mattress 4 years ago and feels his pillows are fine.  He does demonstrate slight muscular weakness but AROM is normal. Special tests for his and shoulders were negative for impingement and tendinitis.     Goal Status:  No data recorded  Pt will: Able to sleep through the night without waking due to shoulder and hip pain within 8-10 visits      Plan / Patient Education:     Review stretches prn  Sit<>stand  Strengthening for shoulders, midback, hips, legs      Subjective:     Pain Ratin while awake and during the day                       7 while sleeping    I think my hip pain is from walking barefoot.  I usually wear orthotics and I had stopped in May.  I now wear my orthotics again and over 90% of hip pain is gone.    My shoulders are still painful at night but the stretches have helped.  I don't wake all the time now due to the pain.   I would love 1-2 more  exercises for my shoulder and think that may be the answer.        Objective:     Patient Active Problem List   Diagnosis     Degenerative cervical disc     BPH (benign prostatic hyperplasia)     Prediabetes     Actinic keratitis     Hypertension     Memory loss     Depression     Exercises:  Exercise #1: stretches: SKC, LTR, shoulder flexion  Comment #1: hold 30 seconds x 1-3 reps  Exercise #2: sit<>stands  X 10 reps in 3-5 chairs  Comment #2: tband:   shoulder extension, external rotation, internal rotation  Exercise #3: 10-20 reps      Treatment Today     TREATMENT MINUTES COMMENTS   Evaluation     Self-care/ Home management     Manual therapy     Neuromuscular Re-education     Therapeutic Activity     Therapeutic Exercises 25 See above or flow sheet  [id number]  Green and orange tband issued   Gait training     Modality__________________                Total 25    Blank areas are intentional and mean the treatment did not include these items.       Lamar Billingsley, PT  8/25/2020

## 2021-06-10 NOTE — PROGRESS NOTES
ASSESSMENT:  1. Physical exam  Patient overall has good health habits though less physically active than in the past.  - PSA (Prostatic-Specific Antigen), Annual Screen  - Hepatitis C Antibody (Anti-HCV)    2. Prediabetes  One value greater than 100.  - Basic Metabolic Panel; Future  - Lipid [place]  - Basic Metabolic Panel    3. Degenerative cervical disc  Overall stable, occasional left arm numbness.    4. BPH (benign prostatic hyperplasia)  Stable, some nocturia.    5. Actinic keratitis  Noted last year    6. Ceruminosis, bilateral  Right worse than left.      PLAN:  1.  Laboratory studies as above.  2.  Patient is going to attempt to be more physically active.  3.  Follow-up in 1 year.       Orders Placed This Encounter   Procedures     PSA (Prostatic-Specific Antigen), Annual Screen     Hepatitis C Antibody (Anti-HCV)     Basic Metabolic Panel     Standing Status:   Future     Number of Occurrences:   1     Standing Expiration Date:   4/28/2018     Lipid [place]     Order Specific Question:   Fasting is required?     Answer:   Yes     There are no discontinued medications.    No Follow-up on file.    CHIEF COMPLAINT:  Chief Complaint   Patient presents with     Annual Exam     pt is fasting  , talk about vacc-pna and flu vacc         SUBJECTIVE:  Nate is a 66 y.o. male presented to clinic today for an annual physical.     Numbness: He experiences episodes of numbness in his left hand and arm. He notes that he is unable to use his arm when it becomes numb, and it resolves on its own after a few minutes. It happens a couple of times a week, inconsistently. He has history of a herniated disc in his neck. He is not worried about the numbness and feels that it is managed well on his own.       REVIEW OF SYSTEMS:   He does not get as much exercise anymore due to retiring. He would like to join athletic programs through his community center. He had a colonoscopy in 2016 with polyps. He is due for another  colonoscopy in 5 years. He recently was seen in clinic due to back pain; it has resolved on its own. He is feeling pressure in his right ear and is hard of hearing.   All other systems are negative.    PFSH:  His   Immunization History   Administered Date(s) Administered     DT (pediatric) 11/16/2000, 10/03/2001     Influenza high dose, seasonal 03/18/2016     Influenza, Seasonal, Inj PF 10/22/2010, 09/29/2011, 09/20/2012, 09/25/2013     Influenza, inj, historic 12/23/2008, 09/22/2009, 09/23/2009     Pneumo Conj 13-V (2010&after) 04/20/2015     Pneumo Polysac 23-V 04/27/2016     Td, historic 10/03/2001     Tdap 09/23/2009     ZOSTER 05/01/2013     Social History     Social History     Marital status:      Spouse name: N/A     Number of children: N/A     Years of education: N/A     Occupational History     retired [employer]     Facilities     Read water meters      No longer doing- [employer]     Social History Main Topics     Smoking status: Former Smoker     Packs/day: 0.50     Years: 5.00     Quit date: 4/27/1963     Smokeless tobacco: Never Used      Comment: smoked a few years in high school     Alcohol use 8.4 oz/week     14 Standard drinks or equivalent per week     Drug use: No     Sexual activity: Yes     Partners: Female     Other Topics Concern     Not on file     Social History Narrative    Diet-healthy        Exercise-walking 3-4x week     Past Medical History:   Diagnosis Date     Anxiety     Work related. Minor attacks lasting 10-15 seconds. Substantial improvement since [facility]. Sense of disconnectedness      Family History   Problem Relation Age of Onset     Benign prostatic hyperplasia Father      Skin cancer Father      Not Melanoma     Kidney failure Father      Ovarian cancer Mother        MEDICATIONS:  No current outpatient prescriptions on file.     No current facility-administered medications for this visit.        TOBACCO USE:  History   Smoking Status     Former Smoker  "    Packs/day: 0.50     Years: 5.00     Quit date: 4/27/1963   Smokeless Tobacco     Never Used     Comment: smoked a few years in high school       VITALS:  Vitals:    04/28/17 0747   BP: 110/82   Pulse: 80   Weight: 151 lb (68.5 kg)   Height: 5' 5\" (1.651 m)     Wt Readings from Last 3 Encounters:   04/28/17 151 lb (68.5 kg)   04/27/16 150 lb (68 kg)   04/20/15 135 lb (61.2 kg)       PHYSICAL EXAM:  Constitutional:   Reveals an alert, pleasant male.  Vitals: per nursing notes.  HEENT: Right Ear: External ear normal.   Left Ear: External ear normal. Slight cerumen impaction bilaterally, right worse than left.  Nose: Nose normal.   Mouth/Throat: Oropharynx is clear and moist.   Eyes: Conjunctivae and EOM are normal. Pupils are equal, round, and reactive to light. Right eye exhibits no discharge. Left eye exhibits no discharge.   Neck:  Supple, no carotid bruits or adenopathy.  Back:  No spine or CVA pain.  Thorax:  No bony deformities.  Lungs: Clear to A&P without rales or wheezes.  Respiratory effort normal.  Cardiac:   Regular rate and rhythm, normal S1, S2, no murmur or gallop.  Abdomen:  Soft, active bowel sounds without bruits, mass, or tenderness.  Extremities:   No peripheral edema, pulses in the feet intact.    Skin:  No jaundice, peripheral cyanosis or lesions to suggest malignancy.  Neuro:  Alert and oriented. Cranial nerves, motor, sensory exams are intact.  No gross focal deficits.  Psychiatric:  Memory intact, mood appropriate.    QUALITY MEASURES:  Discussed  increased physical activity.      DATA REVIEWED:  Additional History from Old Records Summarized (2): Reviewed note from 7/21/16 regarding colonoscopy. Reviewed note from 8/10/16 regarding back pain.   Decision to Obtain Records (1): None  Radiology Tests Summarized or Ordered (1): None  Labs Reviewed or Ordered (1): Reviewed and ordered labs  Medicine Test Summarized or Ordered (1): None  Independent Review of EKG, X-RAY, or RAPID STREP (2 each): " None    The visit lasted a total of 20 minutes face to face with the patient. Over 50% of the time was spent conducting the physical.    I, Debra Meade, am scribing for and in the presence of, Dr. Wu.    I, Dr. Wu, personally performed the services described in this documentation, as scribed by Debra Meade in my presence, and it is both accurate and complete.     Total data points: 3

## 2021-06-10 NOTE — PROGRESS NOTES
[facility]  Shoulder Initial Evaluation        [facility] Certification Request    August 17, 2020      Patient: Nate North  MR Number: [id number]  YOB: 1950  Date of Visit: 8/17/2020      Dear Dr. Wu    Thank you for this referral.   We are seeing Nate North for Physical Therapy of shoulder and hips.    Medicare and/or Medicaid requires physician review and approval of the treatment plan. Please review the plan of care and verify that you agree with the therapy plan of care by co-signing this note.    If you have any questions or concerns, please don't hesitate to call.    Sincerely,      Lamar Billingsley , PT      Physician recommendation:     ___ Follow therapist's recommendation        ___ Modify therapy      *Physician co-signature indicates they certify the need for these services furnished within this plan and while under their care.      Patient Name: Nate North  Date of evaluation: 8/17/2020  Referral Diagnosis: , Shoulder Pain, hip pain  Referring provider: Juan Wu MD  Visit Diagnosis:     ICD-10-CM    1. Pain of both shoulder joints  M25.511     M25.512    2. Hip pain, bilateral  M25.551     M25.552    3. Generalized muscle weakness  M62.81        Assessment:         Patient presents with chronic mechanical and muscular type pain in his hips and shoulders, which only hurt when he tries to sleep.  Once he gets out of bed his pain resolves.  He got a new mattress 4 years ago and feels his pillows are fine.  He does demonstrate slight muscular weakness but AROM is normal. Special tests for his and shoulders were negative for impingement and tendinitis. Feel he will benefit from therapy for basic strengthening and stretches to improve his function.  Pt. is appropriate and a good candidate for skilled PT intervention as outlined in the Plan of Care (POC) to improve pain levels.  Tried many different stretches to target the sore  muscles in his hip but did not find one that targeted it directly.      Goals:  No data recorded  Pt will: Able to sleep through the night without waking due to shoulder and hip pain within 8-10 visits      Patient's expectations/goals are realistic.    Barriers to Learning or Achieving Goals:  Patient Active Problem List   Diagnosis     Degenerative cervical disc     BPH (benign prostatic hyperplasia)     Prediabetes     Actinic keratitis     Hypertension     Memory loss     Depression       POC, goals, and pathology of condition were reviewed with patient.  Patient is in agreement with the POC.       Plan / Patient Instructions:        Plan of Care:   Authorization / Certification Start Date: 08/17/20  Authorization / Certification End Date: 11/13/20  Authorization / Certification Number of Visits: 12  Communication with: Referral Source  Patient Related Instruction: Nature of Condition;Treatment plan and rationale;Self Care instruction;Basis of treatment  Times per Week: 1-2  Number of Weeks: 12  Number of Visits: 8-12, prn  Therapeutic Exercise: ROM;Stretching;Strengthening  Neuromuscular Reeducation: kinesio tape;posture;core  Manual Therapy: soft tissue mobilization;myofascial release;joint mobilization  Modalities: traction;electrical stimulation;TENS      Plan for next visit:     Review stretches prn  Sit<>stand  Strengthening for shoulders, midback, hips, legs         Subjective:         History of Present Illness:    Nate is a 69 y.o. male who presents to therapy today with complaints of cervical, bilateral shoulder and hip pain.  He doesn't feel he is having any neck issues currently so would like to focus on his shoulders and hips.  The shoulder pain started over a year ago with an insidious onset. He feels his hips are hurting due to his constant changing positions sleeping due to his shoulders. Once he gets out of bed his pain resolves. Symptoms are intermittent and not improving.  In 2006 he did  have right shoulder RC surgery, and feels he is 95% healed.    Pain Ratin  Pain rating at best: 0  Pain rating at worst: 9  Pain description: aching and throbbing    Functional limitations are described as occurring with:   sleep    Patient reports benefit from:  roll over, get out of bed         Objective:      Note: Items left blank indicates the item was not performed or not indicated at the time of the evaluation.    Patient Outcome Measures :    Shoulder Pain and Disability Index (SPADI) in %: 11     Scores range from 0-100%, where a score of 0% represents minimal pain and maximal function. The minimal clincically important difference is a score reduction of 10%.    Cervical Thoracic Examination  1. Pain of both shoulder joints     2. Hip pain, bilateral     3. Generalized muscle weakness       Involved side: Bilateral shoulders and hips  Right side dominant  Posture Observation:      General standing posture is fair.    Cervical AROM is within functional limits, limited with rotation ~25% bilaterally    Shoulder/Elbow ROM       Within Normal Limits unless noted  Date: 2020     Shoulder and Elbow ROM ( )   AROM in degrees AROM in degrees AROM in degrees    Right Left Right Left Right Left   Shoulder Flexion (0-180 )         Shoulder Abduction (0-180 )         Shoulder Extension (0-60 )         Shoulder ER (0-90 )         Shoulder IR (0-70 )         Shoulder IR/EXT         Elbow Flexion (150 )         Elbow Extension (0 )          PROM in degrees PROM in degrees PROM in degrees    Right Left Right Left Right Left   Shoulder Flexion (0-180 )         Shoulder Abduction (0-180 )         Shoulder Extension (0-60 )         Shoulder ER (0-90 )         Shoulder IR (0-70 )         Elbow Flexion (150 )         Elbow Extension (0 )             Strength     Date: 20     Cervical Myotomes/5 Right Left Right Left Right Left   Cervical Flexion (C1-2)         Cervical Sidebending (C3)         Shoulder Elevation  (C4) 5 5       Shoulder Abduction (C5) 5- 5-       Elbow Flexion (C6) 5 5       Elbow Extension (C7) 5- 5-       Shoulder Internal rotation 5- 5-       Shoulder External Rotation 4+ 4       scaption 4+ 4+       Finger Abduction (T1)           Hip/Knee Strength  Date: 8/17/2020     Hip/Knee Strength (/5) MMT MMT MMT    Right Left Right Left Right Left   Hip Flexion 5 5       Hip Abduction 5 5       Hip Adduction 4+ 4+       Hip Extension         Hip External Rotation 5- 5-       Hip Internal Rotation 5- 5-       Knee Extension         Knee Flexion           Sensation         Reflex Testing  Cervical Dermatomes Right Left UE Reflexes Right Left   Back of the Head (C2)   Biceps (C5-6)     Supraclavicular Fossa (C3)   Brachioradialis (C5-6)     AC Joint (C4)   Triceps (C7-8)     Lateral Biceps (C5)   Abe s test     Palmar Thumb (C6)   LE Reflexes     Palmar 3rd Finger (C7)   Patellar (L3-4)     Palmar 5th Finger (C8)   Achilles (S1-2)     Ulnar Forearm (T1)   Babinski Response         Flexibility: fair to good at hamstrings and hip AROM.      Palpation: slight pain over bilateral gluteus arleen, inferior to iliac crest    Passive Mobility-Joint Integrity: WNL.  Shoulder Special Tests  Impingement Cluster Right (+/-) Left (+/-) Rotator Cuff Tests Right (+/-) Left (+/-)   Manzano-Efren - - Drop Arm Sign     Painful Arc - - Hornblowers     Infraspinatus Test   ERLS     AC Tests Right (+/-) Left (+/-) IRLS     Active Compression   Labral Tests Right (+/-) Left (+/-)   Crossbody Adduction   Biceps Load Test II - -   AC Resisted Extension   Jerk Test     GH Instability Tests Right (+/-) Left (+/-) Liz Test     Sulcus Sign   Biceps Tests Right (+/-) Left (+/-)   Apprehension - - Speed     Relocation   James esteves     Other:   Other:     Other:   Other:       Hip Special Tests  OA Right (+/-) Left (+/-) Intra Articular Right (+/-) Left (+/-)   Hip Scour - - JOSSELINE - -   Test Cluster  -Hip pain  -Hip IR <15   -Hip Flex  <115    FADIR     Test Cluster  -Painful Hip IR  ->50 years old  -Morning Stiffness <60 min   Passive Supine Rotation Test     Misc. Right (+/-) Left (+/-) Stinchfield Test (SLR Against Resistance)     Ely s   DEXRIT     Rodrigo s - - DIRI     Trendelenburg + + Posterior Rim Impingement     SIJ Right (+/-) Left (+/-) Lateral Rim Impingement     SIJ Compression   Other     SIJ Distraction   Other     POSH Test   Other     Sacral Thrust   Other       Exercises:  Exercise #1: stretches: SKC, LTR, shoulder flexion  Comment #1: hold 30 seconds x 1-3 reps      Treatment Today     TREATMENT MINUTES COMMENTS   Evaluation 20  shoulder, hips   Self-care/ Home management     Manual therapy     Neuromuscular Re-education     Therapeutic Activity     Therapeutic Exercises 38 See above or flow sheet  [id number]    Attempted many different stretches for low back and hips but did not issue to pt.   Gait training     Modality__________________                Total 58    Blank areas are intentional and mean the treatment did not include these items.     PT Evaluation Code: (Please list factors)  Patient History/Comorbidities:   Patient Active Problem List   Diagnosis     Degenerative cervical disc     BPH (benign prostatic hyperplasia)     Prediabetes     Actinic keratitis     Hypertension     Memory loss     Depression     Examination: functional hip weakness, shoulder weakness  Clinical Presentation: stable  Clinical Decision Making: low    Patient History/  Comorbidities Examination  (body structures and functions, activity limitations, and/or participation restrictions) Clinical Presentation Clinical Decision Making (Complexity)   No documented Comorbidities or personal factors 1-2 Elements Stable and/or uncomplicated Low   1-2 documented comorbidities or personal factor 3 Elements Evolving clinical presentation with changing characteristics Moderate   3-4 documented comorbidities or personal factors 4 or more Unstable and  unpredictable High              Lamar Billingsley, PT  8/17/2020  7:39 AM

## 2021-06-10 NOTE — PATIENT INSTRUCTIONS - HE
I will comment on laboratory studies.  See us again in September possible have blood pressure checked before that goal is less than 140/90.        Patient Education   Alcohol Use   Many people can enjoy a glass of wine or beer without any negative consequences to their health. According to the Centers for Disease Control and Prevention (CDC), having one or fewer drinks per day for women and two or fewer per day for men is considered moderate drinking.     When people drink more than moderately, it can become concerning. Excessive drinking is defined as consuming 15 drinks or more per week for men and 8 drinks or more per week for women. There are various health problems associated with excessive drinking, which include:    Damage to vital organs like the heart, brain, liver and pancreas    Harm to the digestive tract    Weaken the immune system    Higher risk for heart disease and cancer       Patient Education   Your Health Risk Assessment indicates you feel you are not in good emotional health.    Recreation   Recreation is not limited to sports and team events. It includes any activity that provides relaxation, interest, enjoyment, and exercise. Recreation provides an outlet for physical, mental, and social energy. It can give a sense of worth and achievement. It can help you stay healthy.    Mental Exercise and Social Involvement  Mental and emotional health is as important as physical health. Keep in touch with friends and family. Stay as active as possible. Continue to learn and challenge yourself.   Things you can do to stay mentally active are:    Learn something new, like a foreign language or musical instrument.     Play SCRABBLE or do crossword puzzles. If you cannot find people to play these games with you at home, you can play them with others on your computer through the Internet.     Join a games club--anything from card games to chess or checkers or lawn bowling.     Start a new hobby.     Go back to  school.     Volunteer.     Read.     Keep up with world events.         Advance Directive  Patient s advance directive was discussed and I am comfortable with the patient s wishes.  Patient Education   Personalized Prevention Plan  You are due for the preventive services outlined below.  Your care team is available to assist you in scheduling these services.  If you have already completed any of these items, please share that information with your care team to update in your medical record.  Health Maintenance   Topic Date Due     TD 18+ HE  09/23/2019     MEDICARE ANNUAL WELLNESS VISIT  04/24/2020     INFLUENZA VACCINE RULE BASED (1) 08/01/2020     FALL RISK ASSESSMENT  05/19/2021     COLORECTAL CANCER SCREENING  07/21/2021     LIPID  04/24/2024     ADVANCE CARE PLANNING  07/20/2025     HEPATITIS C SCREENING  Completed     PNEUMOCOCCAL IMMUNIZATION 65+ LOW/MEDIUM RISK  Completed     ZOSTER VACCINES  Completed     HEPATITIS B VACCINES  Aged Out

## 2021-06-10 NOTE — PROGRESS NOTES
Assessment and Plan:       1. Wellness examination  Patient overall has very good health habits.    2. Actinic keratitis  Left forehead area noted in 2016    3. Benign prostatic hyperplasia   Symptoms stable.    4. Degenerative cervical disc  Ongoing neck pain, also bilateral shoulder pain.  Managed conservatively.  - Ambulatory referral to Physical Therapy    5. Hypertension  Suboptimal control.  - Basic Metabolic Panel  - Lipid Cascade  - lisinopriL (PRINIVIL,ZESTRIL) 20 MG tablet; Take 1 tablet (20 mg total) by mouth daily.  Dispense: 90 tablet; Refill: 0    6. Prediabetes  Several values greater than 100.    7. Need for vaccination     - Tdap vaccine,  8yo or older,  IM    8. Memory loss  Followed by neurology, patient does not yet have a diagnosis.    9. Depression  Presumed, it manifests as anger and irritability.  - sertraline (ZOLOFT) 50 MG tablet; Take one pill daily for mood  Dispense: 30 tablet; Refill: 1    10. Chronic pain of both shoulders  Bilateral shoulder pain probably some underlying osteoarthritis.  - Ambulatory referral to Physical Therapy    11. Screening for prostate cancer     - PSA (Prostatic-Specific Antigen), Annual Screen    PLAN:  1.  Tdap.  2.  Referral to physical therapy both for the neck pain and bilateral shoulder pain.  3.  Begin sertraline 50 mg daily.  4.  Patient has follow-up neuropsychiatric testing and neurology visit for ongoing memory concerns.  5.  Cryotherapy to the left forehead skin lesion.  6.  Laboratory studies as above.  7.  Increase lisinopril from 10 mg to 20 mg daily.  8.  Follow-up in September or so for mood memory and hypertension and other issues.    The patient's current medical problems were reviewed.    I have had an Advance Directives discussion with the patient.  The following health maintenance schedule was reviewed with the patient and provided in printed form in the after visit summary:   Health Maintenance   Topic Date Due     TD 18+ HE  09/23/2019      INFLUENZA VACCINE RULE BASED (1) 08/01/2020     COLORECTAL CANCER SCREENING  07/21/2021     MEDICARE ANNUAL WELLNESS VISIT  07/31/2021     FALL RISK ASSESSMENT  07/31/2021     LIPID  04/24/2024     ADVANCE CARE PLANNING  07/31/2025     HEPATITIS C SCREENING  Completed     PNEUMOCOCCAL IMMUNIZATION 65+ LOW/MEDIUM RISK  Completed     ZOSTER VACCINES  Completed     HEPATITIS B VACCINES  Aged Out        Subjective:   Chief Complaint: Nate North is an 69 y.o. male here for an Annual Wellness visit.   HPI: Patient is being followed by neurology for more recent concerns about memory, he does not have a specific diagnosis yet but he is undergoing neuropsychiatric testing in the near future.  He did have normal vitamin B12 and thyroid levels.    Patient reports that he and his wife have been in therapy he has anger issues, this could be a manifestation of depression and he admits that he may have some of that, I also discussed that his anger could be a manifestation of depression and after discussion we will start him on a medication for this.    He has ongoing issues with neck pain but is also having bilateral shoulder pain, this is preventing him from sleeping well, he has had physical therapy in the past which she thinks has been helpful.    Is a history of actinic keratoses the left forehead area he has a lesion consistent with this which was frozen today.    He has a history of hypertension on lisinopril, his blood pressure is elevated I told him we do need to increase his blood pressure medication.    Otherwise no other change in the patient's health status his other comorbidities have been stable, no recent illnesses or sicknesses.  I reviewed the patient's allergies medications active medical problems past medical history past surgical history family history and social history.    Review of Systems:     Please see above.  The rest of the review of systems are negative for all systems.    Patient Care  Team:  Juan Wu MD as PCP - General  [facility] [place] (Generic Provider)  Giovani Bhat MD as Physician (Orthopedic Surgery)  Clarence Zheng MD as Physician (Ophthalmology)  Juan Wu MD as Assigned PCP     Patient Active Problem List   Diagnosis     Degenerative cervical disc     BPH (benign prostatic hyperplasia)     Prediabetes     Actinic keratitis     Hypertension     Memory loss     Depression     Past Medical History:   Diagnosis Date     Anxiety     Work related. Minor attacks lasting 10-15 seconds. Substantial improvement since care home. Sense of disconnectedness       Past Surgical History:   Procedure Laterality Date     INGUINAL HERNIA REPAIR Right      ROTATOR CUFF REPAIR Right 2007     VASECTOMY             Family History   Problem Relation Age of Onset     Benign prostatic hyperplasia Father      Skin cancer Father         Not Melanoma     Kidney failure Father      Ovarian cancer Mother      Glaucoma Sister      Skin cancer Brother      Glaucoma Brother      Glaucoma Sister       Social History     Socioeconomic History     Marital status:      Spouse name: Not on file     Number of children: Not on file     Years of education: Not on file     Highest education level: Not on file   Occupational History     Occupation: retired     Employer: [employer]     Comment: Facilities     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meters     Comment: No longer doing- [employer]   Social Needs     Financial resource strain: Not on file     Food insecurity     Worry: Not on file     Inability: Not on file     Transportation needs     Medical: Not on file     Non-medical: Not on file   Tobacco Use     Smoking status: Former Smoker     Packs/day: 0.50     Years: 5.00     Pack years: 2.50     Types: Cigarettes     Last attempt to quit: 1963     Years since quittin.3     Smokeless tobacco: Never Used     Tobacco comment: smoked a few years in high school   Substance and  "Sexual Activity     Alcohol use: Yes     Alcohol/week: 14.0 standard drinks     Types: 14 Standard drinks or equivalent per week     Drug use: No     Sexual activity: Yes     Partners: Female   Lifestyle     Physical activity     Days per week: Not on file     Minutes per session: Not on file     Stress: Not on file   Relationships     Social connections     Talks on phone: Not on file     Gets together: Not on file     Attends Temple service: Not on file     Active member of club or organization: Not on file     Attends meetings of clubs or organizations: Not on file     Relationship status: Not on file     Intimate partner violence     Fear of current or ex partner: Not on file     Emotionally abused: Not on file     Physically abused: Not on file     Forced sexual activity: Not on file   Other Topics Concern     Not on file   Social History Narrative    Diet-healthy        Exercise-walking 3-4x week      Current Outpatient Medications   Medication Sig Dispense Refill     lisinopriL (PRINIVIL,ZESTRIL) 20 MG tablet Take 1 tablet (20 mg total) by mouth daily. 90 tablet 0     sertraline (ZOLOFT) 50 MG tablet Take one pill daily for mood 30 tablet 1     No current facility-administered medications for this visit.       Objective:   Vital Signs:   Visit Vitals  /76   Pulse 82   Ht 5' 4\" (1.626 m)   Wt 139 lb 1.6 oz (63.1 kg)   BMI 23.88 kg/m           VisionScreening:  No exam data present     PHYSICAL EXAM  Physical Exam   Constitutional: He is oriented to person, place, and time. He appears well-developed and well-nourished.   HENT:   Head: Normocephalic and atraumatic.   Right Ear: External ear normal.   Eyes: Pupils are equal, round, and reactive to light. Conjunctivae and EOM are normal.   Cardiovascular: Normal rate, regular rhythm and normal heart sounds.   Pulmonary/Chest: Effort normal and breath sounds normal.   Musculoskeletal: Normal range of motion.   Neurological: He is alert and oriented to " person, place, and time.   Skin: Skin is warm and dry.   In the left forehead area there is a reddish lesion with some white tissue about 6 mm across.   Psychiatric: He has a normal mood and affect. His behavior is normal. Judgment and thought content normal.     Procedure.  I used cryotherapy to freeze the lesion allowed to thaw and refrozen.    Assessment Results 7/31/2020   Activities of Daily Living No help needed   Instrumental Activities of Daily Living No help needed   Mini Cog Total Score 4   Some recent data might be hidden     A Mini-Cog score of 0-2 suggests the possibility of dementia, score of 3-5 suggests no dementia  {Fall Risk no concerns  {Cognitive Screen patient is followed by neurology for cognitive concerns.    Identified Health Risks:     The patient reports that he drinks more than one alcoholic drink per day but denies binge or excessive drinking. He was counseled and given information about possible harmful effects of excessive alcohol intake.  The patient was provided with suggestions to help him develop a healthy emotional lifestyle.   Patient's advanced directive was discussed and I am comfortable with the patient's wishes.

## 2021-06-10 NOTE — PROGRESS NOTES
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   Neurobehavioral Status Exam:  (e.g., clinical interview and neurobehavioral status exam via telehealth, interpretation, report)   103 minutes   1 x 96116  1 x 96121   Neuropsychological Evaluation Services:   (e.g., integration, interpretation, treatment planning, clinical decision making, feedback via telehealth)   167 minutes   1 x 96132  2 x 96133   Neuropsychological Testing by Trained Examiner/Technician:  (e.g., test administration, scoring, 2+ tests administered)   54 minutes   1 x 96138  1 x 96139   For diagnostic and coding purposes, Mr. North has a history of hypertension, prediabetes, BPH, depression, anxiety, and daily alcohol use and was referred for an evaluation of mild neurocognitive disorder.

## 2021-06-10 NOTE — PROGRESS NOTES
The patient was seen for a neuropsychological evaluation for the purposes of diagnostic clarification and treatment planning. 48 minutes of telehealth testing were provided by this writer. An additional 16 minutes were spent scoring and compiling test results.The patient was cooperative with testing. No concerns were brought to my attention. Please see Dr. Aviles's report for a detailed description of the charges and interpretation and integration of the findings.    Testing start: 1147 (8/10)  Testing stop: 1235 (8/10)  Scoring start: 1146 (8/14)  Scoring Stop: 1202 (8/14)

## 2021-06-11 NOTE — PROGRESS NOTES
[facility] Rehabilitation Daily Progress     Patient Name: Nate North  Date: 2020  Visit #: 3/8-, prn  POC Dates: 20-20  Referral Diagnosis: Degenerative cervical disc  Referring provider: Juan Wu MD  Visit Diagnosis:     ICD-10-CM    1. Pain of both shoulder joints  M25.511     M25.512    2. Hip pain, bilateral  M25.551     M25.552    3. Generalized muscle weakness  M62.81          Assessment:     Patient's pain levels have decreased in his hips and shoulders and feels most of the pain is gone.  As pain levels decrease he finds he can do more.  He is now able to lay on his shoulder and doesn't wake due to pain.  Patient is appropriate to continue with skilled physical therapy intervention, as indicated by initial plan of care.    From evaluation:  Patient presents with chronic mechanical and muscular type pain in his hips and shoulders, which only hurt when he tries to sleep.  Once he gets out of bed his pain resolves.  He got a new mattress 4 years ago and feels his pillows are fine.  He does demonstrate slight muscular weakness but AROM is normal. Special tests for his and shoulders were negative for impingement and tendinitis.     Goal Status:  No data recorded  Pt will: Able to sleep through the night without waking due to shoulder and hip pain within 8-10 visits      Plan / Patient Education:     Strengthening for shoulders, midback, hips, legs      Subjective:     Pain Ratin while awake and during the day                       3 while sleeping    I don't have much pain in my shoulders at night any more.  Last night I was a little uncomfortable but it really wasn't pain.  I don't have any hip pain    Objective:     Patient Active Problem List   Diagnosis     Degenerative cervical disc     BPH (benign prostatic hyperplasia)     Prediabetes     Actinic keratitis     Hypertension     Memory loss     Depression     Exercises:  Exercise #1: stretches: SKC, LTR, shoulder  flexion  Comment #1: hold 30 seconds x 1-3 reps  Exercise #2: sit<>stands  X 10 reps in 3-5 chairs  Comment #2: tband:   shoulder extension, external rotation, internal rotation  Exercise #3: 10-20 reps  Comment #3: sitting hip adduction squeezing pillow (isometric)   hold 10 seconds X 6 reps  Exercise #4: clamshells  X 20  Comment #4: bridges   hold 10 seconods x 6  Exercise #5: orange tband:  shoulder diagonal  X 20 reps      Treatment Today     TREATMENT MINUTES COMMENTS   Evaluation     Self-care/ Home management     Manual therapy     Neuromuscular Re-education     Therapeutic Activity     Therapeutic Exercises 25 See above or flow sheet  [id number]     Gait training     Modality__________________                Total 25    Blank areas are intentional and mean the treatment did not include these items.       Lamar Billingsley, PT  8/28/2020

## 2021-06-11 NOTE — PATIENT INSTRUCTIONS - HE
I will comment on laboratory studies.  See us again in 6 months either for an office visit or a physical

## 2021-06-11 NOTE — PROGRESS NOTES
"  Office Visit - Follow Up   Nate North   66 y.o. male    Date of Visit: 6/28/2017    Chief Complaint   Patient presents with     Follow-up     right thumb-still painful-swollen        Assessment and Plan   1. Thumb pain, right  X-ray was reviewed with patient and confirmed negative for fracture and dislocation.  Possible Bell's palsy was noted.  We discussed the need to see a hand specialist for possible reevaluation.  Patient verbalized understanding and agreed with care plan.  - Ambulatory referral to Orthopedic Surgery       History of Present Illness   This 66 y.o. old male patient returns today for follow-up of his right thumb pain.  Patient reported that after applying a splint for 2 weeks the pain did not resolve.  He also feels that the thumb is somewhat swollen.    Review of Systems: A comprehensive review of systems was negative except as noted.     Medications, Allergies and Problem List   Reviewed and updated     Physical Exam   General Appearance: Well groomed    /76  Pulse 84  Ht 5' 5\" (1.651 m)  Wt 149 lb 14.4 oz (68 kg)  SpO2 98%  BMI 24.94 kg/m2    Right thumb: Pain with movement, clicking sound.  No redness or swelling noted during examination.       Additional Information   No current outpatient prescriptions on file.     No current facility-administered medications for this visit.      No Known Allergies  Social History   Substance Use Topics     Smoking status: Former Smoker     Packs/day: 0.50     Years: 5.00     Quit date: 4/27/1963     Smokeless tobacco: Never Used      Comment: smoked a few years in high school     Alcohol use 8.4 oz/week     14 Standard drinks or equivalent per week     Time: total time spent with the patient was 10 minutes of which >50% was spent in counseling and coordination of care     Shilpi Candelario CNP    "

## 2021-06-11 NOTE — PROGRESS NOTES
[facility] Rehabilitation Daily Progress /Discharge    Patient Name: Nate North  Date: 2020  Visit #: -, prn  POC Dates: 20-20  Referral Diagnosis: Degenerative cervical disc  Referring provider: Juan Wu MD  Visit Diagnosis:     ICD-10-CM    1. Pain of both shoulder joints  M25.511     M25.512    2. Hip pain, bilateral  M25.551     M25.552    3. Generalized muscle weakness  M62.81          Assessment:     Pt feels the pain has resolved in his hips once he put his orthotics back in his shoes and the shoulder is feeling better with the exercises.  He will try the tband exercises at the garage door or try it on the door knob.  The knot doesn't fit in the hinged side of a door so will try these other suggestions.    Goal Status:  No data recorded  Pt will: Able to sleep through the night without waking due to shoulder and hip pain within 8-10 visits  GOAL MET   DOES NOT WAKE DUE TO PAIN IN HIP OR SHOULDER.  HE CAN SLEEP ON HIS HIP AND SHOULDER      Plan / Patient Education:     Patient is discharged at this point.  He will continue to perform his exercises on a regular basis.       Subjective:     Pain Ratin while awake and during the day                       3 while sleeping    I have been very busy with yard work.  I can't find a good door to do the exercises with the tband.    Shoulders feel much better.      Objective:     UE and LE strength has improved to 5 to 5-/5 throughout.    Exercises:  Exercise #1: stretches: SKC, LTR, shoulder flexion  Comment #1: hold 30 seconds x 1-3 reps  Exercise #2: sit<>stands  X 10 reps in 3-5 chairs  Comment #2: tband:   shoulder extension, external rotation, internal rotation   orange band for ER and IR   Green band for extension  Exercise #3: 10-20 reps  Comment #3: sitting hip adduction squeezing pillow (isometric)   hold 10 seconds X 6 reps  Exercise #4: clamshells  X 20  Comment #4: bridges   hold 10 seconods x 6  Exercise #5:  orange tband:  shoulder diagonal  X 20 reps      Treatment Today     TREATMENT MINUTES COMMENTS   Evaluation     Self-care/ Home management     Manual therapy     Neuromuscular Re-education     Therapeutic Activity     Therapeutic Exercises 25 See above or flow sheet  [id number]     Gait training     Modality__________________                Total 25    Blank areas are intentional and mean the treatment did not include these items.       Lamar Billingsley, PT  9/9/2020

## 2021-06-11 NOTE — PROGRESS NOTES
[facility] Rehabilitation Daily Progress     Patient Name: Nate North  Date: 2020  Visit #: -, prn  POC Dates: 20-20  Referral Diagnosis: Degenerative cervical disc  Referring provider: Juan Wu MD  Visit Diagnosis:     ICD-10-CM    1. Pain of both shoulder joints  M25.511     M25.512    2. Hip pain, bilateral  M25.551     M25.552    3. Generalized muscle weakness  M62.81          Assessment:     Patient felt like there were too many exercises so split them up into two days-core exercises M, W and F and shoulder exercises T, Th, and Sa.  He felt this was more manageable. He will do the stretches daily as well as the sit<>stand.   Reviewed the arms exercises today and with cues, he felt more confident in performing them.  He knows these will help support from the muscle groups he is working and will help with pain relief.  Patient is appropriate to continue with skilled physical therapy intervention, as indicated by initial plan of care.    From evaluation:  Patient presents with chronic mechanical and muscular type pain in his hips and shoulders, which only hurt when he tries to sleep.  Once he gets out of bed his pain resolves.  He got a new mattress 4 years ago and feels his pillows are fine.  He does demonstrate slight muscular weakness but AROM is normal. Special tests for his and shoulders were negative for impingement and tendinitis.     Goal Status:  No data recorded  Pt will: Able to sleep through the night without waking due to shoulder and hip pain within 8-10 visits      Plan / Patient Education:     May discharge next visit      Subjective:     Pain Ratin while awake and during the day                       3 while sleeping    I am doing good but have been very busy  I didn't have time to do all my exercises this weekend.  I would like to go through them and pick the most important ones    Objective:     Patient Active Problem List   Diagnosis      Degenerative cervical disc     BPH (benign prostatic hyperplasia)     Prediabetes     Actinic keratitis     Hypertension     Memory loss     Depression     Exercises:  Exercise #1: stretches: SKC, LTR, shoulder flexion  Comment #1: hold 30 seconds x 1-3 reps  Exercise #2: sit<>stands  X 10 reps in 3-5 chairs  Comment #2: tband:   shoulder extension, external rotation, internal rotation  Exercise #3: 10-20 reps  Comment #3: sitting hip adduction squeezing pillow (isometric)   hold 10 seconds X 6 reps  Exercise #4: clamshells  X 20  Comment #4: bridges   hold 10 seconods x 6  Exercise #5: orange tband:  shoulder diagonal  X 20 reps      Treatment Today     TREATMENT MINUTES COMMENTS   Evaluation     Self-care/ Home management     Manual therapy     Neuromuscular Re-education     Therapeutic Activity     Therapeutic Exercises 25 See above or flow sheet  [id number]     Gait training     Modality__________________                Total 25    Blank areas are intentional and mean the treatment did not include these items.       Lamar Billingsley, PT  8/31/2020

## 2021-06-11 NOTE — PROGRESS NOTES
ASSESSMENT:  1. Hypertension  Currently well controlled.  - Basic Metabolic Panel    2. Depression  Sertraline has been helpful.  - sertraline (ZOLOFT) 50 MG tablet; Take one pill daily for mood  Dispense: 90 tablet; Refill: 1    3. Memory loss  Followed by neurology.    4. Loose stools  Possibly secondary to underlying sertraline.        PLAN:  1.  Recheck basic metabolic profile.  2.  Renew and continue sertraline 50 mg daily.  3.  Watchful waiting for the loose stools.  4.  Patient be due for either office visit or physical in 6 months.    Orders Placed This Encounter   Procedures     Basic Metabolic Panel     Medications Discontinued During This Encounter   Medication Reason     sertraline (ZOLOFT) 50 MG tablet Reorder       No follow-ups on file.    CHIEF COMPLAINT:  Chief Complaint   Patient presents with     Follow-up     sertaline - patient states he has been able to deal with his emotions. bp check        SUBJECTIVE:  Nate is a 70 y.o. male presents for follow-up blood pressure and other issues.  I increased in July the lisinopril to 20 mg as blood pressure is now very well controlled.    Back in July also I started the patient on sertraline for what I assume to be some underlying depression which is manifesting itself as anger issues this is also improved, relations with his wife have improved, there were times when there was some anger issues.  He does still have some intermittent recurrent anxiety or panic attacks other very brief in duration quick onset and quick offset, I am hoping over time that the sertraline can also help this.    He is noticing that his stools are looser multiple potential causes but the sertraline is 1 potential cause of this I like to do watchful waiting to see if this with time does not resolve but fiber may be needed in the future.    REVIEW OF SYSTEMS:      All other systems are negative.    PFSH:  Immunization History   Administered Date(s) Administered     DT (pediatric)  2000, 10/03/2001     Influenza high dose,seasonal,PF, 65+ yrs 2016, 2017     Influenza, Seasonal, Inj PF IIV3 10/22/2010, 2011, 2012, 2013     Influenza, inj, historic,unspecified 2008, 2009, 2009     Influenza,seasonal, Inj IIV3 2005, 2008, 2009     Influenza,seasonal,quad inj =/> 6months 2019     Pneumo Conj 13-V (2010&after) 2015     Pneumo Polysac 23-V 2016     Td, Adult, Absorbed 10/03/2001     Td,adult,historic,unspecified 10/03/2001     Tdap 2009, 2020     ZOSTER, LIVE 2013     ZOSTER, RECOMBINANT, IM 2018, 2019, 2019     Social History     Socioeconomic History     Marital status:      Spouse name: Not on file     Number of children: Not on file     Years of education: Not on file     Highest education level: Not on file   Occupational History     Occupation: retired     Employer: [email or web address]     Comment: Facilities     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meters     Comment: No longer doing- [employer]   Social Needs     Financial resource strain: Not on file     Food insecurity     Worry: Not on file     Inability: Not on file     Transportation needs     Medical: Not on file     Non-medical: Not on file   Tobacco Use     Smoking status: Former Smoker     Packs/day: 0.50     Years: 5.00     Pack years: 2.50     Types: Cigarettes     Last attempt to quit: 1963     Years since quittin.4     Smokeless tobacco: Never Used     Tobacco comment: smoked a few years in high school   Substance and Sexual Activity     Alcohol use: Yes     Alcohol/week: 14.0 standard drinks     Types: 14 Standard drinks or equivalent per week     Drug use: No     Sexual activity: Yes     Partners: Female   Lifestyle     Physical activity     Days per week: Not on file     Minutes per session: Not on file     Stress: Not on file   Relationships     Social connections     Talks on phone: Not on file      Gets together: Not on file     Attends Worship service: Not on file     Active member of club or organization: Not on file     Attends meetings of clubs or organizations: Not on file     Relationship status: Not on file     Intimate partner violence     Fear of current or ex partner: Not on file     Emotionally abused: Not on file     Physically abused: Not on file     Forced sexual activity: Not on file   Other Topics Concern     Not on file   Social History Narrative    Diet-healthy        Exercise-walking 3-4x week     Past Medical History:   Diagnosis Date     Anxiety     Work related. Minor attacks lasting 10-15 seconds. Substantial improvement since FDC. Sense of disconnectedness      Family History   Problem Relation Age of Onset     Benign prostatic hyperplasia Father      Skin cancer Father         Not Melanoma     Kidney failure Father      Ovarian cancer Mother      Glaucoma Sister      Skin cancer Brother      Glaucoma Brother      Glaucoma Sister        MEDICATIONS:  Current Outpatient Medications   Medication Sig Dispense Refill     lisinopriL (PRINIVIL,ZESTRIL) 20 MG tablet Take 1 tablet (20 mg total) by mouth daily. 90 tablet 0     sertraline (ZOLOFT) 50 MG tablet Take one pill daily for mood 90 tablet 1     No current facility-administered medications for this visit.        TOBACCO USE:  Social History     Tobacco Use   Smoking Status Former Smoker     Packs/day: 0.50     Years: 5.00     Pack years: 2.50     Types: Cigarettes     Last attempt to quit: 1963     Years since quittin.4   Smokeless Tobacco Never Used   Tobacco Comment    smoked a few years in high school       VITALS:  Vitals:    20 1028 20 1037   BP: 156/80 129/74   Pulse: (!) 111 92   Weight: 138 lb (62.6 kg)      Wt Readings from Last 3 Encounters:   20 138 lb (62.6 kg)   20 139 lb 1.6 oz (63.1 kg)   20 145 lb (65.8 kg)       PHYSICAL EXAM:  Constitutional:   Reveals a male who  appears overall healthy.  Vitals: per nursing notes.  Eyes:  EOMs full, PERRL.  Musculoskeletal: No peripheral swelling.  Neuro:  Alert and oriented. Cranial nerves, motor, sensory exams are intact.  No gross focal deficits.  Psychiatric:  Memory intact, mood appropriate.    QUALITY MEASURES:      DATA REVIEWED:

## 2021-06-11 NOTE — PROGRESS NOTES
"Assessment:   1. Thumb pain, right  Likely secondary to right thumb sprain     Plan:   Natural history and expected course discussed. Questions answered.  Rest, ice, compression, and elevation (RICE) therapy.  Reduction in offending activity discussed.  Dorsal finger splinting.  Plain film x-rays per orders.  NSAIDs per medication orders.    Follow up in 2 weeks    Subjective:   Nate North is a 66 y.o. male who presents for evaluation of right thumb pain. Onset was sudden, not related to any specific activity. The pain is mild, worsens with movement, and is relieved by rest. There is no associated numbness, tingling, weakness in fingers and thumb. Evaluation to date: none. Treatment to date: nothing specific.    The following portions of the patient's history were reviewed and updated as appropriate:   He  has a past medical history of Anxiety.  He  does not have any pertinent problems on file.  He  has a past surgical history that includes Rotator cuff repair (Right, 2007); Inguinal hernia repair (Right); and Vasectomy (1997).  His family history includes Benign prostatic hyperplasia in his father; Kidney failure in his father; Ovarian cancer in his mother; Skin cancer in his father.  He  reports that he quit smoking about 54 years ago. He has a 2.50 pack-year smoking history. He has never used smokeless tobacco. He reports that he drinks about 8.4 oz of alcohol per week  He reports that he does not use illicit drugs.  No current outpatient prescriptions on file.     No current facility-administered medications for this visit.      No current outpatient prescriptions on file prior to visit.     No current facility-administered medications on file prior to visit.      He has No Known Allergies..    Review of Systems  A 12 point comprehensive review of systems was negative except as noted.      Objective:      /78  Pulse 72  Ht 5' 5\" (1.651 m)  Wt 151 lb 11.2 oz (68.8 kg)  SpO2 95%  BMI 25.24 " kg/m2  Right hand:  soft tissue tenderness and swelling at the 1st metacarpal joint and clicking at the 1st interphalangeal joint, radial pulse normal, sensation normal, negative Phalen, negative Tinel and remainder of ipsilateral wrist, hand and finger exam is normal   Left hand:  normal exam, no swelling, tenderness, instability; ligaments intact, full ROM both hands, wrists, and finger joints      Imaging:  X-ray right hand: not available

## 2021-06-12 NOTE — TELEPHONE ENCOUNTER
Refill Approved    Rx renewed per Medication Renewal Policy. Medication was last renewed on 7/31/20.    Melba Adamson, [facility] Triage/Med Refill 10/29/2020     Requested Prescriptions   Pending Prescriptions Disp Refills     lisinopriL (PRINIVIL,ZESTRIL) 20 MG tablet [Pharmacy Med Name: LISINOPRIL 20MG TABS] 90 tablet 0     Sig: TAKE ONE TABLET BY MOUTH EVERY DAY       [facility] Refill Protocol Passed - 10/27/2020  4:14 AM        Passed - PCP or prescribing provider visit in past 12 months       Last office visit with prescriber/PCP: 9/18/2020 Juan Wu MD OR same dept: 9/18/2020 Juan Wu MD OR same specialty: 9/18/2020 Juan Wu MD  Last physical: 7/31/2020 Last MTM visit: Visit date not found   Next visit within 3 mo: Visit date not found  Next physical within 3 mo: Visit date not found  Prescriber OR PCP: Juan Wu MD  Last diagnosis associated with med order: 1. Hypertension, unspecified type  - lisinopriL (PRINIVIL,ZESTRIL) 20 MG tablet [Pharmacy Med Name: LISINOPRIL 20MG TABS]; TAKE ONE TABLET BY MOUTH EVERY DAY  Dispense: 90 tablet; Refill: 0    If protocol passes may refill for 12 months if within 3 months of last provider visit (or a total of 15 months).             Passed - Serum Potassium in past 12 months     Lab Results   Component Value Date    Potassium 4.1 09/18/2020             Passed - Blood pressure filed in past 12 months     BP Readings from Last 1 Encounters:   09/18/20 129/74             Passed - Serum Creatinine in past 12 months     Creatinine   Date Value Ref Range Status   09/18/2020 0.91 0.70 - 1.30 mg/dL Final

## 2021-06-13 NOTE — PROGRESS NOTES
Chief Complaint   Patient presents with     Flu Vaccine     Flu consent and contraindication forms are given/ signed/ reviewed and sent to medical records to scan.     Isha Rainey CMA [facility] 9/28/2017 10:12 AM

## 2021-06-14 NOTE — PROGRESS NOTES
Procedures  Area cleansed with topical alcohol  1%  Lidocaine with epinephrine for local anesthetic  Used DermaBlade to remove lesion  Hemostasis obtained with silver nitrate  Covered with gauze dressing

## 2021-06-14 NOTE — PROGRESS NOTES
ASSESSMENT:  1. Neoplasm of uncertain behavior  Patient has a lesion in the left temple area that is consistent with a seborrheic keratosis.  He has a lesion in the right mid back, possible seborrheic keratosis, though by size and some level of inflammation, this is of unclear etiology.    2. Groin pain, right  Patient is status post right inguinal hernia raphe in the distant past now with right groin pain over the site of the hernia.  I do not see evidence of a recurrent hernia.  There certainly could be the beginning of a new hernia, among other etiologies.  - CT Abdomen Pelvis Without Oral With Without IV Contrast; Future      PLAN:  1.  Reassurance about the left temple skin lesion.  2.  Removal of the back lesion, to pathology for analysis.  3.  CT scan of the abdomen pelvis.  4.  Depending on the results of the CT scan, could be surgical referral versus watchful waiting versus other interventions.    Orders Placed This Encounter   Procedures     CT Abdomen Pelvis Without Oral With Without IV Contrast     Standing Status:   Future     Standing Expiration Date:   12/6/2018     Order Specific Question:   Reason for Exam (Describe Symptoms):     Answer:   Patient is status post right inguinal hernia repair in the past, now with pain over the hernia site, but no palpable hernia right groin area     Order Specific Question:   Can the procedure be changed per Radiologist protocol?     Answer:   Yes     Order Specific Question:   If this is a diagnostic procedure, have the patient's age and recent imaging history been considered?     Answer:   Yes     There are no discontinued medications.    No Follow-up on file.    CHIEF COMPLAINT:  Chief Complaint   Patient presents with     Nevus     on his back, check spots on his face left side        SUBJECTIVE:  Nate is a 67 y.o. male presenting to the clinic today for a nevus check.     Nevus: He has a mole on his back that his wife is concerned about. He does not know how  long it has been there, though believes it has been for years. He would like to have it inspected today. He also has some spots on the left side of his face that he would like inspected. He is concerned that he will knick the spot and it will bleed when he is shaving.     Groin Pain: He had a right inguinal hernia repair roughly ten years ago. He has noticed that the pain around the area of the hernia has started to occur again. He denies any bulging or pooching though notes that the area is tender to the touch.     Health Maintenance: He has already received the influenza vaccine for the upcoming season.     REVIEW OF SYSTEMS:   All other systems are negative.    PFSH:  Immunization History   Administered Date(s) Administered     DT (pediatric) 11/16/2000, 10/03/2001     Influenza high dose, seasonal 03/18/2016, 09/28/2017     Influenza, Seasonal, Inj PF IIV3 10/22/2010, 09/29/2011, 09/20/2012, 09/25/2013     Influenza, inj, historic,unspecified 12/23/2008, 09/22/2009, 09/23/2009     Pneumo Conj 13-V (2010&after) 04/20/2015     Pneumo Polysac 23-V 04/27/2016     Td,adult,historic,unspecified 10/03/2001     Tdap 09/23/2009     ZOSTER 05/01/2013     Social History     Social History     Marital status:      Spouse name: N/A     Number of children: N/A     Years of education: N/A     Occupational History     retired [employer]     Facilities     Read water meters      No longer doing- [facility]     Social History Main Topics     Smoking status: Former Smoker     Packs/day: 0.50     Years: 5.00     Quit date: 4/27/1963     Smokeless tobacco: Never Used      Comment: smoked a few years in high school     Alcohol use 8.4 oz/week     14 Standard drinks or equivalent per week     Drug use: No     Sexual activity: Yes     Partners: Female     Other Topics Concern     Not on file     Social History Narrative    Diet-healthy        Exercise-walking 3-4x week     Past Medical History:   Diagnosis Date      Anxiety     Work related. Minor attacks lasting 10-15 seconds. Substantial improvement since correction. Sense of disconnectedness      Family History   Problem Relation Age of Onset     Benign prostatic hyperplasia Father      Skin cancer Father      Not Melanoma     Kidney failure Father      Ovarian cancer Mother        MEDICATIONS:  No current outpatient prescriptions on file.     No current facility-administered medications for this visit.        TOBACCO USE:  History   Smoking Status     Former Smoker     Packs/day: 0.50     Years: 5.00     Quit date: 4/27/1963   Smokeless Tobacco     Never Used     Comment: smoked a few years in high school       VITALS:  Vitals:    12/06/17 1026   BP: 130/70   Pulse: 70   Weight: 145 lb (65.8 kg)     Wt Readings from Last 3 Encounters:   12/06/17 145 lb (65.8 kg)   06/28/17 149 lb 14.4 oz (68 kg)   06/06/17 151 lb 11.2 oz (68.8 kg)       PHYSICAL EXAM:  Constitutional:   Reveals an alert, pleasant, talkative male.  Vitals: per nursing notes.  Genitourinary: Right groin surgical scar noted. Tender to palpation, no hernia noted.   Skin: Left temple area brown one centimeter slightly raised well-circumscribed lesion. Right mid back raised circular dome shaped brown lesion 1.5 centimeters across, ring of erythema surrounding.  Neuro:  Alert and oriented. Cranial nerves, motor, sensory exams are intact.  No gross focal deficits.  Psychiatric:  Memory intact, mood appropriate.    DATA REVIEWED:  Additional History from Old Records Summarized (2): None.  Decision to Obtain Records (1): None.  Radiology Tests Summarized or Ordered (1): Ordered abdominal CT scan.   Labs Reviewed or Ordered (1): None.  Medicine Test Summarized or Ordered (1): None.  Independent Review of EKG, X-RAY, or RAPID STREP (2 each): None.     The visit lasted a total of 15 minutes face to face with the patient. Over 50% of the time was spent counseling and educating the patient about skin lesions.    Alexis DIEZ  Silvio, am scribing for and in the presence of, Dr. Wu.    I, Dr. Wu, personally performed the services described in this documentation, as scribed by Alexis Anguiano in my presence, and it is both accurate and complete.    Total data points: 1

## 2021-06-16 PROBLEM — F32.A DEPRESSION: Status: ACTIVE | Noted: 2020-07-31

## 2021-06-16 PROBLEM — R41.3 MEMORY LOSS: Status: ACTIVE | Noted: 2020-07-31

## 2021-06-16 PROBLEM — I10 HYPERTENSION: Status: ACTIVE | Noted: 2019-04-24

## 2021-06-16 NOTE — TELEPHONE ENCOUNTER
Refill Approved    Rx renewed per Medication Renewal Policy. Medication was last renewed on 9/18/20.    Marshal Sykes, [facility] Triage/Med Refill 3/24/2021     Requested Prescriptions   Pending Prescriptions Disp Refills     sertraline (ZOLOFT) 50 MG tablet [Pharmacy Med Name: SERTRALINE HCL 50MG TABS] 90 tablet 1     Sig: TAKE ONE TABLET BY MOUTH EVERY DAY       SS[facility] Refill Protocol  Passed - 3/23/2021  4:20 AM        Passed - PCP or prescribing provider visit in last year     Last office visit with prescriber/PCP: 9/18/2020 Juan Wu MD OR same dept: 9/18/2020 Juan Wu MD OR same specialty: 9/18/2020 Juan Wu MD  Last physical: 7/31/2020 Last MTM visit: Visit date not found   Next visit within 3 mo: Visit date not found  Next physical within 3 mo: Visit date not found  Prescriber OR PCP: Juan Wu MD  Last diagnosis associated with med order: 1. Depression, unspecified depression type  - sertraline (ZOLOFT) 50 MG tablet [Pharmacy Med Name: SERTRALINE HCL 50MG TABS]; TAKE ONE TABLET BY MOUTH EVERY DAY  Dispense: 90 tablet; Refill: 1    If protocol passes may refill for 12 months if within 3 months of last provider visit (or a total of 15 months).

## 2021-06-17 NOTE — PROGRESS NOTES
Assessment and Plan:   ASSESSMENT:  1. Wellness examination  Patient overall has good health habits.    2. Actinic keratitis  Noted once in 2016    3. BPH (benign prostatic hyperplasia)  Symptoms during the day though no nocturia, symptoms stable.    4. Degenerative cervical disc  Occasional intermittent recurrent left radicular symptoms, though controlled and mild.    5. Prediabetes  One elevated value.  - Comprehensive Metabolic Panel  - Lipid Cascade    6. Screening for prostate cancer  No family history of prostate cancer.  - PSA (Prostatic-Specific Antigen), Annual Screen      PLAN:   1.  Laboratory studies as above.  2.  Patient has some minor medical issues such as tinnitus, dizziness.  Watchful waiting for these.  3.  Was continue healthy habits and should be seen yearly.  b    The patient's current medical problems were reviewed.    I have had an Advance Directives discussion with the patient.  The following health maintenance schedule was reviewed with the patient and provided in printed form in the after visit summary:   Health Maintenance   Topic Date Due     FALL RISK ASSESSMENT  04/23/2019     TD 18+ HE  09/23/2019     COLONOSCOPY  07/21/2021     ADVANCE DIRECTIVES DISCUSSED WITH PATIENT  04/23/2023     PNEUMOCOCCAL POLYSACCHARIDE VACCINE AGE 65 AND OVER  Completed     INFLUENZA VACCINE RULE BASED  Completed     PNEUMOCOCCAL CONJUGATE VACCINE FOR ADULTS (PCV13 OR PREVNAR)  Completed     ZOSTER VACCINE  Completed        Subjective:   Chief Complaint: Nate North is an 67 y.o. male here for an Annual Wellness visit.     HPI:    Dizziness: He has started to notice that upon waking in the morning, and subsequently standing up, he will experience a brief episode of dizziness. The sensation lasts for about five to six seconds in duration and at this time he does not trust himself to walk. He has not noticed the dizziness at other points throughout the day.     Hearing Loss: He hast noticed a greater  frequency of tinnitus and hearing loss. He notices it more often in louder environments. He inquires about making an appointment with ENT or audiologist and acknowledges that he will likely require hearing aids in the future.     Degenerative Disc Disease: He has a history of neck pain and stiffness following a herniated cervical disc. He will still experience the effects of the injury as his left arm will go numb if he lies down in certain positions. He also cannot hold a telephone in the left hand for an extended period of time as it will also go numb. He denies any weakness of the left arm.     Health Maintenance: He is up-to-date on all of his immunizations. He will wait to receive the Shingrix vaccine until checking insurance coverage.     Review of Systems:      He rarely wakes in the middle of the night to urinate. He schedules annual visits with the ophthalmologist. The rest of the review of systems are negative for all systems.    PFSH:  Family: His mother passed away from ovarian cancer. Many of his siblings have had glaucoma.     Patient Care Team:  Juan Wu MD as PCP - General  [facility] (Generic Provider)  Giovani Bhat MD as Physician (Orthopedic Surgery)  Clarence Zheng MD as Physician (Ophthalmology)     Patient Active Problem List   Diagnosis     Degenerative cervical disc     BPH (benign prostatic hyperplasia)     Prediabetes     Actinic keratitis     Past Medical History:   Diagnosis Date     Anxiety     Work related. Minor attacks lasting 10-15 seconds. Substantial improvement since half-way. Sense of disconnectedness       Past Surgical History:   Procedure Laterality Date     INGUINAL HERNIA REPAIR Right      ROTATOR CUFF REPAIR Right 2007     VASECTOMY  1997           Family History   Problem Relation Age of Onset     Benign prostatic hyperplasia Father      Skin cancer Father      Not Melanoma     Kidney failure Father      Ovarian cancer Mother      Glaucoma  "Sister      Skin cancer Brother       Social History     Social History     Marital status:      Spouse name: N/A     Number of children: N/A     Years of education: N/A     Occupational History     retired [employer]     Facilities     Read water meters      No longer doing- [employer]     Social History Main Topics     Smoking status: Former Smoker     Packs/day: 0.50     Years: 5.00     Types: Cigarettes     Quit date: 4/27/1963     Smokeless tobacco: Never Used      Comment: smoked a few years in high school     Alcohol use 8.4 oz/week     14 Standard drinks or equivalent per week     Drug use: No     Sexual activity: Yes     Partners: Female     Other Topics Concern     Not on file     Social History Narrative    Diet-healthy        Exercise-walking 3-4x week      No current outpatient prescriptions on file.     No current facility-administered medications for this visit.       Objective:   Vital Signs:   Visit Vitals     /66     Pulse 60     Ht 5' 4.5\" (1.638 m)     Wt 144 lb (65.3 kg)     BMI 24.34 kg/m2        VisionScreening:  No exam data present     PHYSICAL EXAM  Constitutional:   Reveals a pleasant male that appears stated age.  Vitals: per nursing notes.  HEENT: Right Ear: External ear normal.   Left Ear: External ear normal.   Nose: Nose normal.   Mouth/Throat: Oropharynx is clear and moist.   Eyes: Conjunctivae and EOM are normal. Pupils are equal, round, and reactive to light. Right eye exhibits no discharge. Left eye exhibits no discharge.   Neck:  Supple, no carotid bruits or adenopathy.  Back:  No spine or CVA pain.  Thorax:  No bony deformities.  Lungs: Clear to A&P without rales or wheezes.  Respiratory effort normal.  Cardiac:   Regular rate and rhythm, normal S1, S2, no murmur or gallop.  Abdomen:  Soft, active bowel sounds without bruits, mass, or tenderness.  Extremities:   No peripheral edema, pulses in the feet intact.    Skin:  No jaundice, peripheral cyanosis or " lesions to suggest malignancy.  Neuro:  Alert and oriented. Cranial nerves, motor, sensory exams are intact.  No gross focal deficits.  Psychiatric:  Memory intact, mood appropriate.    Assessment Results 4/23/2018   Activities of Daily Living No help needed   Instrumental Activities of Daily Living No help needed   Mini Cog Total Score 5   Some recent data might be hidden     A Mini-Cog score of 0-2 suggests the possibility of dementia, score of 3-5 suggests no dementia    Identified Health Risks:     He is at risk for lack of exercise and has been provided with information to increase physical activity for the benefit of his well-being.  The patient reports that he drinks more than one alcoholic drink per day but denies binge or excessive drinking. He was counseled and given information about possible harmful effects of excessive alcohol intake.  The patient was counseled and encouraged to consider modifying their diet and eating habits. He was provided with information on recommended healthy diet options.  Patient's advanced directive was discussed and I am comfortable with the patient's wishes.    DATA REVIEWED:  ADDITIONAL HISTORY SUMMARIZED (2): Reviewed progress note 4/28/17; physical exam.   DECISION TO OBTAIN EXTRA INFORMATION (1): None.   RADIOLOGY TESTS (1): None.  LABS (1): Reviewed labs 12/6/17; pathology report. Ordered labs; lipid cascade, comprehensive metabolic panel, PSA.   MEDICINE TESTS (1): None.  INDEPENDENT REVIEW (2 each): None.     The visit lasted a total of 26 minutes face to face with the patient. Over 50% of the time was spent counseling and educating the patient about health maintenance.    IAlexis, am scribing for and in the presence of, Dr. Wu.    I, Dr. Wu, personally performed the services described in this documentation, as scribed by Alexis Anguiano in my presence, and it is both accurate and complete.    Total Data Points: 3

## 2021-06-17 NOTE — PATIENT INSTRUCTIONS - HE
Patient Instructions by Juan Wu MD at 4/24/2019  9:30 AM     Author: Juan Wu MD Service: -- Author Type: Physician    Filed: 4/24/2019 10:42 AM Encounter Date: 4/24/2019 Status: Addendum    : Juan Wu MD (Physician)    Related Notes: Original Note by Juan Wu MD (Physician) filed at 4/24/2019  9:27 AM       I will comment on laboratory studies.  As discussed, check your blood pressure 2 times a week or so, if values are elevated or mostly elevated greater than 140/90 then see us in the month or so and we would need to start medication.        Patient Education     Exercise for a Healthier Heart  You may wonder how you can improve the health of your heart. If youre thinking about exercise, youre on the right track. You dont need to become an athlete, but you do need a certain amount of brisk exercise to help strengthen your heart. If you have been diagnosed with a heart condition, your doctor may recommend exercise to help stabilize your condition. To help make exercise a habit, choose safe, fun activities.       Be sure to check with your health care provider before starting an exercise program.    Why exercise?  Exercising regularly offers many healthy rewards. It can help you do all of the following:    Improve your blood cholesterol levels to help prevent further heart trouble    Lower your blood pressure to help prevent a stroke or heart attack    Control diabetes, or reduce your risk of getting this disease    Improve your heart and lung function    Reach and maintain a healthy weight    Make your muscles stronger and more limber so you can stay active    Prevent falls and fractures by slowing the loss of bone mass (osteoporosis)    Manage stress better  Exercise tips  Ease into your routine. Set small goals. Then build on them.  Exercise on most days. Aim for a total of 150 or more minutes of moderate to  vigorous intensity activity each week. Consider 40 minutes, 3  to 4 times a week. For best results, activity should last for 40 minutes on average. It is OK to work up to the 40 minute period over time. Examples of moderate-intensity activity is walking one mile in 15 minutes or 30 to 45 minutes of yard work.  Step up your daily activity level. Along with your exercise program, try being more active throughout the day. Walk instead of drive. Do more household tasks or yard work.  Choose one or more activities you enjoy. Walking is one of the easiest things you can do. You can also try swimming, riding a bike, or taking an exercise class.  Stop exercising and call your doctor if you:    Have chest pain or feel dizzy or lightheaded    Feel burning, tightness, pressure, or heaviness in your chest, neck, shoulders, back, or arms    Have unusual shortness of breath    Have increased joint or muscle pain    Have palpitations or an irregular heartbeat      4922-0143 [employer]. [address] [place] [address]. All rights reserved. This information is not intended as a substitute for professional medical care. Always follow your healthcare professional's instructions.         Patient Education   Alcohol Use   Many people can enjoy a glass of wine or beer without any negative consequences to their health. According to the Centers for Disease Control and Prevention (CDC), having one or fewer drinks per day for women and two or fewer per day for men is considered moderate drinking.     When people drink more than moderately, it can become concerning. Excessive drinking is defined as consuming 15 drinks or more per week for men and 8 drinks or more per week for women. There are various health problems associated with excessive drinking, which include:    Damage to vital organs like the heart, brain, liver and pancreas    Harm to the digestive tract    Weaken the immune system    Higher risk for heart disease and cancer       Patient Education   Understanding USDA  MyPlate  The USDA (US Department of Agriculture) has guidelines to help you make healthy food choices. These are called MyPlate. MyPlate shows the food groups that make up healthy meals using the image of a place setting. Before you eat, think about the healthiest choices for what to put onto your plate or into your cup or bowl. To learn more about building a healthy plate, visit www.choosemyplate.gov.       The Food Groups    Fruits: Any fruit or 100% fruit juice counts as part of the Fruit Group. Fruits may be fresh, canned, frozen, or dried, and may be whole, cut-up, or pureed. Make half your plate fruits and vegetables.    Vegetables: Any vegetable or 100% vegetable juice counts as a member of the Vegetable Group. Vegetables may be fresh, frozen, canned, or dried. They can be served raw or cooked and may be whole, cut-up, or mashed. Make half your plate fruits and vegetables.     Grains: All foods made from grains are part of the Grains Group. These include wheat, rice, oats, cornmeal, and barley such as bread, pasta, oatmeal, cereal, tortillas, and grits. Grains should be no more than a quarter of your plate. At least half of your grains should be whole grains.    Protein: This group includes meat, poultry, seafood, beans and peas, eggs, processed soy products (like tofu), nuts (including nut butters), and seeds. Make protein choices no more than a quarter of your plate. Meat and poultry choices should be lean or low fat.    Dairy: All fluid milk products and foods made from milk that contain calcium, like yogurt and cheese are part of the Dairy Group. (Foods that have little calcium, such as cream, butter, and cream cheese, are not part of the group.) Most dairy choices should be low-fat or fat-free.    Oils: These are fats that are liquid at room temperature. They include canola, corn, olive, soybean, and sunflower oil. Foods that are mainly oil include mayonnaise, certain salad dressings, and soft  margarines. You should have only 5 to 7 teaspoons of oils a day. You probably already get this much from the food you eat.  Use [employer] to Help Build Your Meals  The SuperTracker can help you plan and track your meals and activity. You can look up individual foods to see or compare their nutritional value. You can get guidelines for what and how much you should eat. You can compare your food choices. And you can assess personal physical activities and see ways you can improve. Go to www.[place] Toxicology.gov/Health eVillagescker/.    7340-7669 "[employer]. [address] [place] [address]. All rights reserved. This information is not intended as a substitute for professional medical care. Always follow your healthcare professional's instructions.             Advance Directive  Patients advance directive was discussed and I am comfortable with the patients wishes.  Patient Education   Personalized Prevention Plan  You are due for the preventive services outlined below.  Your care team is available to assist you in scheduling these services.  If you have already completed any of these items, please share that information with your care team to update in your medical record.  Health Maintenance   Topic Date Due   ? ZOSTER VACCINES (3 of 3) 07/30/2018   ? INFLUENZA VACCINE RULE BASED (1) 08/01/2018   ? FALL RISK ASSESSMENT  04/23/2019   ? TD 18+ HE  09/23/2019   ? COLONOSCOPY  07/21/2021   ? ADVANCE DIRECTIVES DISCUSSED WITH PATIENT  04/23/2023   ? PNEUMOCOCCAL POLYSACCHARIDE VACCINE AGE 65 AND OVER  Completed   ? PNEUMOCOCCAL CONJUGATE VACCINE FOR ADULTS (PCV13 OR PREVNAR)  Completed

## 2021-06-19 NOTE — PROGRESS NOTES
"ASSESSMENT:  1. Right knee pain  Acute onset right knee pain, I am more suspicious for a strain rather than a more serious injury such as a meniscal tear.  - Ambulatory referral to Physical Therapy      PLAN:  1.  Referral to physical therapy.  2.  Discussed with the patient that if the knee pain does not improve, then would consider an MRI but I think it is likely that time and physical therapy will be all that he needs.  3.  I generally see the patient once a year for an annual visit, he would be due next spring.       Orders Placed This Encounter   Procedures     Ambulatory referral to Physical Therapy     Referral Priority:   Routine     Referral Type:   Physical Therapy     Referral Reason:   Evaluation and Treatment     Requested Specialty:   Physical Therapy     Number of Visits Requested:   1     There are no discontinued medications.    No Follow-up on file.    CHIEF COMPLAINT:  Chief Complaint   Patient presents with     Knee Pain     Bilateral knee pain.  Worse in the right knee.  Started 2-3 weeks ago after chasing new dog.        SUBJECTIVE:  Nate is a 67 y.o. male who presents with right knee pain. He attributes his knee pain to an incident three weeks ago, when he was chasing his dog. He did not stretch prior to this. For the first few days following the event, he could barely walk and started taking 2 aspirin daily. He has not taken aspirin for the past week. He denies any obvious injury to the knee, but thinks he \"wrenched it a bit\". His knee feels a bit numb but when he twists his right knee, he experiences a sharp pain. He can bend his knee without pain. He denies seeing any swelling to the knee. Rubbing his knee seems to help. He does not have pain in his left knee. He has not been avoiding using his knees. He finds he has been more careful when he goes down the stairs carrying heavy objects.     REVIEW OF SYSTEMS:   All other systems are negative.    PFSH:  Immunization History   Administered " Date(s) Administered     DT (pediatric) 11/16/2000, 10/03/2001     Influenza high dose, seasonal 03/18/2016, 09/28/2017     Influenza, Seasonal, Inj PF IIV3 10/22/2010, 09/29/2011, 09/20/2012, 09/25/2013     Influenza, inj, historic,unspecified 12/23/2008, 09/22/2009, 09/23/2009     Influenza,seasonal, Inj IIV3 11/19/2005, 12/23/2008, 09/22/2009     Pneumo Conj 13-V (2010&after) 04/20/2015     Pneumo Polysac 23-V 04/27/2016     Td, Adult, Absorbed 10/03/2001     Td,adult,historic,unspecified 10/03/2001     Tdap 09/23/2009     ZOSTER, LIVE 05/01/2013     ZOSTER, RECOMBINANT, IM 06/04/2018     Social History     Social History     Marital status:      Spouse name: N/A     Number of children: N/A     Years of education: N/A     Occupational History     retired [employer]     Facilities     Read water meters      [facility]     Social History Main Topics     Smoking status: Former Smoker     Packs/day: 0.50     Years: 5.00     Types: Cigarettes     Quit date: 4/27/1963     Smokeless tobacco: Never Used      Comment: smoked a few years in high school     Alcohol use 8.4 oz/week     14 Standard drinks or equivalent per week     Drug use: No     Sexual activity: Yes     Partners: Female     Other Topics Concern     Not on file     Social History Narrative    Diet-healthy        Exercise-walking 3-4x week     Past Medical History:   Diagnosis Date     Anxiety     Work related. Minor attacks lasting 10-15 seconds. Substantial improvement since intermediate. Sense of disconnectedness      Family History   Problem Relation Age of Onset     Benign prostatic hyperplasia Father      Skin cancer Father      Not Melanoma     Kidney failure Father      Ovarian cancer Mother      Glaucoma Sister      Skin cancer Brother        MEDICATIONS:  No current outpatient prescriptions on file.     No current facility-administered medications for this visit.        TOBACCO USE:  History   Smoking Status      Former Smoker     Packs/day: 0.50     Years: 5.00     Types: Cigarettes     Quit date: 4/27/1963   Smokeless Tobacco     Never Used     Comment: smoked a few years in high school       VITALS:  Vitals:    08/08/18 0945   BP: 136/76   Pulse: 88   Weight: 143 lb 9.6 oz (65.1 kg)     Wt Readings from Last 3 Encounters:   08/08/18 143 lb 9.6 oz (65.1 kg)   04/23/18 144 lb (65.3 kg)   12/06/17 145 lb (65.8 kg)       PHYSICAL EXAM:  Constitutional:   Reveals an alert, pleasant male.  Vitals: per nursing notes.  Musculoskeletal: Right knee -No peripheral swelling. No reproducible tenderness. Pain with flexion.  Neuro:  Alert and oriented. Cranial nerves, motor, sensory exams are intact.  No gross focal deficits.  Psychiatric:  Memory intact, mood appropriate.      DATA REVIEWED:  Additional History from Old Records Summarized (2): None  Decision to Obtain Records (1): None  Radiology Tests Summarized or Ordered (1): None  Labs Reviewed or Ordered (1): None  Medicine Test Summarized or Ordered (1): None  Independent Review of EKG, X-RAY, or RAPID STREP (2 each): None    The visit lasted a total of 18 minutes face to face with the patient. Over 50% of the time was spent counseling and educating the patient about knee pain.    I, Deja Russo, am scribing for and in the presence of, Dr. Wu.    I, Dr. Wu, personally performed the services described in this documentation, as scribed by Deja Russo in my presence, and it is both accurate and complete.    Total data points: 0

## 2021-06-19 NOTE — LETTER
Letter by Juan Wu MD at      Author: Juan Wu MD Service: -- Author Type: --    Filed:  Encounter Date: 4/24/2019 Status: (Other)         Nate North  [address] [facility] [place] [address]             April 24, 2019         Dear Mr. North,    Below are the results from your recent visit: Cholesterol is overall well controlled, the total is slightly high only because the HDL or so-called good cholesterol is so high at 88 which is good.  Sugar is exactly 100, ideally under 100, but this is lower than it has been, continue to focus on good diet and exercise to help sugar.  Liver and kidney tests are normal.    Resulted Orders   Lipid Cascade   Result Value Ref Range    Cholesterol 203 (H) <=199 mg/dL    Triglycerides 52 <=149 mg/dL    HDL Cholesterol 88 >=40 mg/dL    LDL Calculated 105 <=129 mg/dL    Patient Fasting > 8hrs? Yes    Basic Metabolic Panel   Result Value Ref Range    Sodium 143 136 - 145 mmol/L    Potassium 4.5 3.5 - 5.0 mmol/L    Chloride 105 98 - 107 mmol/L    CO2 28 22 - 31 mmol/L    Anion Gap, Calculation 10 5 - 18 mmol/L    Glucose 100 70 - 125 mg/dL    Calcium 10.4 8.5 - 10.5 mg/dL    BUN 12 8 - 28 mg/dL    Creatinine 0.94 0.70 - 1.30 mg/dL    GFR MDRD Af Amer >60 >60 mL/min/1.73m2    GFR MDRD Non Af Amer >60 >60 mL/min/1.73m2    Narrative    Fasting Glucose reference range is 70-99 mg/dL per  American Diabetes Association (ADA) guidelines.            Please call with questions or contact us using [employer].    Sincerely,        Electronically signed by Juan Wu MD

## 2021-06-19 NOTE — LETTER
Letter by Juan Wu MD at      Author: Juan Wu MD Service: -- Author Type: --    Filed:  Encounter Date: 9/4/2019 Status: (Other)         Nate North  [address] [facility] [place] [address]             September 4, 2019         Dear Mr. North,    Below are the results from your recent visit: Kidney tests are normal and stable.    Resulted Orders   Basic Metabolic Panel   Result Value Ref Range    Sodium 143 136 - 145 mmol/L    Potassium 4.2 3.5 - 5.0 mmol/L    Chloride 105 98 - 107 mmol/L    CO2 28 22 - 31 mmol/L    Anion Gap, Calculation 10 5 - 18 mmol/L    Glucose 164 (H) 70 - 125 mg/dL    Calcium 10.0 8.5 - 10.5 mg/dL    BUN 11 8 - 28 mg/dL    Creatinine 0.89 0.70 - 1.30 mg/dL    GFR MDRD Af Amer >60 >60 mL/min/1.73m2    GFR MDRD Non Af Amer >60 >60 mL/min/1.73m2    Narrative    Fasting Glucose reference range is 70-99 mg/dL per  American Diabetes Association (ADA) guidelines.            Please call with questions or contact us using [employer].    Sincerely,        Electronically signed by Juan Wu MD

## 2021-06-20 NOTE — PROGRESS NOTES
[facility] Discharge Summary  Patient Name: Nate North  Date: 1/21/2019  Referral Diagnosis: Right knee pain  Referring provider: Juan Wu MD  Visit Diagnosis:   1. Acute pain of right knee     2. Acute pain of left knee     3. Knee stiffness, right     4. Decreased strength         Goals:  No Data Recorded  No Data Recorded    Patient was seen for 3 visits ending on 9/19/18.  A trial of independent self management was initiated.  The patient did not return to continue any physical therapy.  Please see attached note for patient status.    Therapy will be discontinued at this time.     Thank you for your referral.  Nate Victoria  1/21/2019  11:28 AM       [facility] Daily Progress     Patient Name: Nate North  Date: 9/19/2018  Visit #: 3/6  Evaluation date:  9/6/18  Referral Diagnosis: Right knee pain  Referring provider: Juan Wu MD  Visit Diagnosis:     ICD-10-CM    1. Acute pain of right knee M25.561    2. Acute pain of left knee M25.562    3. Knee stiffness, right M25.661    4. Decreased strength R53.1        Precautions / Restrictions : degenerative cervical disc, BPH, prediabetes,      Assessment:     Excellent progress with PT.  He reports 95% overall improvement since starting PT.  He is independent with his HEP and comfortable with continuing.    Symptoms are consistent with:  right knee patellofemoral knee pain, with restricted quad, hamstring and gastroc secondary to likely muscular strain while chasing his dog.  Patient is appropriate to continue with skilled physical therapy intervention, as indicated by initial plan of care.    Goal Status:  Pt. will demonstrate/verbalize independence in self-management of condition in : 6 weeks;Partially Met;Progressing toward  Pt. will be independent with home exercise program in : 6 weeks;Met  Pt. will improve posture : and demonstrate posture with minimal to no cuing;in 6 weeks;Met  Pt. will be able to walk  : 6 blocks;with no pain;with less difficulty;for exercise/recreation;in 6 weeks;Partially met;Progressing toward  Patient will stand : 60 minutes;with less pain;with less difficultty;in 6 weeks;Met  Patient will ascend / descend: stairs;with recipricol gait;with no pain;with less difficulty;in 6 weeks;Partially met;Progressing toward  Patient will transfer: sit/stand;with less pain;with less difficulty;in 6 weeks;Met  No Data Recorded  Other functional progress:          Plan / Patient Education:     Trial of independent self-management of condition initiated.  Patient to contact PT by phone or schedule an appointment as needed if symptoms increase or progress stops.  If patient has not returned to continue therapy in 30 days then physical therapy will be discharged.       Subjective:     Pain Rating:  Resting 0  Activity:  0    Response to last treatment: feels better  HEP- Frequency: 2x/day, Questions or difficulties:  none.    Patient reports:      The knee is feeling much better.  95% overall improvement since starting    He and his wife are thinking of joining the [facility] to work out.    He is comfortable with continuing independently.      Objective:       Patella mobility has improved to slightly less than normal.    Manual Therapy  MFR layers 1-3 right TFL, quad, hamstrings  Manual therapy:  right patella inferior mobilization for knee flexion    Rate/grade Target  Direction  Relative movement Location in range Patient position   2,3 Superior pole of patella Inferior    Inferior glide of patella in the femoral groove. Varying degrees of knee flexion from full extension to end-range flexion. Supine        Manual therapy:  right patella medial mobilization    Rate/grade Target  Direction  Relative movement Location in range Patient position   2,3 Lateral border of patella Medial   Medial glide of patella on the femoral groove. Full knee extension  Supine        Lower Extremity Functional  "Scale (_/80): 69  Improved from 34/80 at evaluation.      Exercises:  Exercise #1: gastroc stretch   Comment #1: 30\" x 2 bilateral  Exercise #2: hamstring stretch  Comment #2: 30\" x 2 bilateral  Exercise #3: piriformis stretch  Comment #3: 30\" x 2 bilateral HEP and manual  Exercise #4: LAQ  Comment #4: 5\" x 10 bilateral  Exercise #5: sit to stand without UE support  Comment #5: 3 x 10 reps  Exercise #6: standing hip abduction   Comment #6: 3 x 10 reps bilateral  Exercise #7: nustep  Comment #7: 3 min resistance 5  Exercise #8: heel/toe raises  Comment #8: 10 bilateral  Exercise #9: knee flexion  Comment #9: 10 bilateral       Treatment Today    TREATMENT MINUTES COMMENTS   Evaluation     Self-care/ Home management     Manual therapy 10 See above.   Neuromuscular Re-education     Therapeutic Activity     Therapeutic Exercises 15 See exercise flow-sheet for details.    Gait training     Modality__________________                Total 25    Blank areas are intentional and mean the treatment did not include these items.       Nate Victoria, PT  9/19/2018     "

## 2021-06-20 NOTE — PROGRESS NOTES
[facility]   Knee Initial Evaluation    Patient Name: Nate North  Date of evaluation: 9/6/2018  Referral Diagnosis: Right knee pain  Referring provider: Juan Wu MD  Visit Diagnosis:     ICD-10-CM    1. Acute pain of right knee M25.561    2. Acute pain of left knee M25.562    3. Knee stiffness, right M25.661    4. Decreased strength R53.1        Assessment:      Impairments in  pain, posture, ROM, joint mobility, strength, gait/locomotion and balance  Patient's signs and symptoms are consistent with right knee patellofemoral knee pain, with restricted quad, hamstring and gastroc secondary to likely muscular strain while chasing his dog..  Patient responded well to manual therapy and therapeutic exercises..  Prognosis to achieve goals is  good   Pt. is appropriate for skilled PT intervention as outlined in the Plan of Care (POC).    Goals:  Pt. will demonstrate/verbalize independence in self-management of condition in : 6 weeks  Pt. will be independent with home exercise program in : 6 weeks  Pt. will improve posture : and demonstrate posture with minimal to no cuing;in 6 weeks  Pt. will be able to walk : 6 blocks;with no pain;with less difficulty;for exercise/recreation;in 6 weeks  Patient will stand : 60 minutes;with less pain;with less difficultty;in 6 weeks  Patient will ascend / descend: stairs;with recipricol gait;with no pain;with less difficulty;in 6 weeks  Patient will transfer: sit/stand;with less pain;with less difficulty;in 6 weeks  No Data Recorded    Patient's expectations/goals are realistic.    Barriers to Learning or Achieving Goals:  No Barriers.       Plan / Patient Instructions:      Plan of Care:   Communication with: Referral Source  Patient Related Instruction: Nature of Condition;Body mechanics;Posture;Treatment plan and rationale;Precautions;Next steps;Self Care instruction;Expected outcome;Basis of treatment  Times per Week: 1  Number of Weeks: 6  Number of Visits:  "6  Discharge Planning: to include self management strategies and HEP.  PT will be discharged when goals are met or when patient is ready to continue with self care independently.  If condition declines or if the patient fails to progress, they will be referred back to their PCP or other appropriate health care provider.   Precautions / Restrictions : degenerative cervical disc, BPH, prediabetes,   Therapeutic Exercise: ROM;Stretching;Strengthening  Neuromuscular Reeducation: posture;balance/proprioception;TNE;core  Manual Therapy: soft tissue mobilization;myofascial release;joint mobilization    Plan for next visit: stationary bike, review stretches, test stairs, add LE strengthening to HEP, continue with manual therapy,      Subjective:          History of Present Illness:    Nate \"Antonia" is a 68 y.o. male who presents to therapy today with complaints of right>left knee pain. Date of onset/duration of symptoms is 2018. Onset was sudden after chasing his 8 month old dog. Symptoms are constant. He denies history of similar symptoms. He describes their previous level of function as not limited    Pain Ratin  Pain rating at best: 1  Pain rating at worst: 5  Pain description:aching, numbness, pain, sharp, soreness and weakness    Functional limitations are described as occurring with:   descending stairs or curbs  sitting    standing    transitional movements sit to stand  walking             Objective:      Note: Items left blank indicates the item was not performed or not indicated at the time of the evaluation.    Patient Outcome Measures :    Lower Extremity Functional Scale (_/80): 34   Scores range from 0-80, where a score of 80 represents maximum function. The minimal clinically important difference is a positive change of 9 points.    Knee Examination  1. Acute pain of right knee     2. Acute pain of left knee     3. Knee stiffness, right     4. Decreased strength       Precautions / Restrictions : " degenerative cervical disc, BPH, prediabetes,    Involved Side: bilateral:  right>>left  Posture Observation:      General sitting posture is  fair.  General standing posture is fair.  Cervical:  Moderate forward head  Shoulder/Thoracic complex: Moderate bilateral scapular protraction   Lower extremity:  Knee:  Right flexion contracture.  Foot/Ankle:  toe out  bilateral   Assistive Device: None  Gait Observation: mildly antalgic, slightly increased toeing out bilaterally.  Lumbar Clearing: Does not provoke symptoms  Hip Clearing: Does not provoke symptoms    Knee ROM      Date:  9/6/18    AROM in degrees  Right   Left  Right   Left  Right   Left       Knee Flexion  (130 )   120  130                    Knee Extension  (0 )   -5   0                 PROM in degrees  Right   Left  Right   Left  Right   Left       Knee Flexion  (130 )                         Knee Extension  (0 )                       LE Strength                   Date:  9/6/18   Strength (MMT/5)  Right   Left  Right   Left  Right   Left       Hip Flexion   5 5                     Hip Abduction   4  4                    Hip Adduction   4  4                    Hip Extension   4 4                     Hip Internal Rotation                         Hip External Rotation                         Knee Extension   5 5                     Knee Flexion   4  5                    Ankle Dorsiflexion   5 5                     Ankle Plantarflexion                       Flexibility:  Limited quad, hamstring and gastroc flexibility    Palpation:  Tender in above noted muscles right>left.    Knee Special Tests (+/-):       Knee OA Cluster   Right   Left   Ligament Tests   Right   Left    1. > 51 y/o           Lachman   -  -     2. Stiffness > 30 min.   -   -     Anterior Drawer          3. Crepitus   -   -     Posterior Drawer          4. Bony tenderness   -   -     Posterior Sag          5. Bone enlargement   -   -     Valgus Stress   - -      6. No warmth to the touch      "      Varus Stress   - -       Meniscal Tests   Right   Left    Other   Right    Left       Wilfredo's           Ely's             Joint line tenderness           Rodrigo             Thegeronimo   -  -      Thomas Apley's                        Treatment Today       Therapeutic Exercises:  Exercise #1: gastroc stretch   Comment #1: 30\" x 2 bilateral  Exercise #2: hamstring stretch  Comment #2: 30\" x 2 bilateral  Exercise #3: piriformis stretch  Comment #3: 30\" x 2 bilateral HEP and manual       Manual therapy:  MFR layers 1-3 right: TFL, quad, hamstring, gastroc    Manual therapy:  right patella inferior mobilization for knee flexion    Rate/grade Target  Direction  Relative movement Location in range Patient position   2,3 Superior pole of patella Inferior    Inferior glide of patella in the femoral groove. Varying degrees of knee flexion from full extension to end-range flexion. Supine        Manual therapy:  right patella medial mobilization    Rate/grade Target  Direction  Relative movement Location in range Patient position   2,3 Lateral border of patella Medial   Medial glide of patella on the femoral groove. Full knee extension  Supine        Manual therapy:  right patella superior mobilization    Rate/grade Target  Direction  Relative movement Location in range Patient position   2,3 Inferior border of patella Superior   Superior glide of patella in the femoral groove. Full knee extension  Supine        Manual therapy:  right knee distraction in extension.    Rate/grade Target  Direction  Relative movement Location in range Patient position   3 Hands around tibia and fibula of lower leg. Inferior force  Inferior distraction of tibial plateau from femoral condyles Full knee extension. Supine           TREATMENT MINUTES COMMENTS   Evaluation 30    Self-care/ Home management     Manual therapy 15    Neuromuscular Re-education     Therapeutic Activity     Therapeutic Exercises 10    Gait training   "   Modality__________________                Total 55    Blank areas are intentional and mean the treatment did not include these items.       PT Evaluation Code: (Please list factors)  Patient History/Comorbidities:   Patient Active Problem List   Diagnosis     Degenerative cervical disc     BPH (benign prostatic hyperplasia)     Prediabetes     Actinic keratitis      Examination: as above  Clinical Presentation: stable  Clinical Decision Making:  Low complexity.    Patient History/  Comorbidities Examination  (body structures and functions, activity limitations, and/or participation restrictions) Clinical Presentation Clinical Decision Making (Complexity)   No documented Comorbidities or personal factors 1-2 Elements Stable and/or uncomplicated Low   1-2 documented comorbidities or personal factor 3 Elements Evolving clinical presentation with changing characteristics Moderate   3-4 documented comorbidities or personal factors 4 or more Unstable and unpredictable High             Nate Victoria  9/6/2018  9:21 AM

## 2021-06-20 NOTE — LETTER
Letter by Juan Wu MD at      Author: Juan Wu MD Service: -- Author Type: --    Filed:  Encounter Date: 9/18/2020 Status: (Other)         Nate North  [address] [facility] [address]             September 18, 2020         Dear Mr. North,    Below are the results from your recent visit: Kidney tests are completely normal.    Resulted Orders   Basic Metabolic Panel   Result Value Ref Range    Sodium 137 136 - 145 mmol/L    Potassium 4.1 3.5 - 5.0 mmol/L    Chloride 100 98 - 107 mmol/L    CO2 26 22 - 31 mmol/L    Anion Gap, Calculation 11 5 - 18 mmol/L    Glucose 183 (H) 70 - 125 mg/dL    Calcium 9.8 8.5 - 10.5 mg/dL    BUN 15 8 - 28 mg/dL    Creatinine 0.91 0.70 - 1.30 mg/dL    GFR MDRD Af Amer >60 >60 mL/min/1.73m2    GFR MDRD Non Af Amer >60 >60 mL/min/1.73m2    Narrative    Fasting Glucose reference range is 70-99 mg/dL per  American Diabetes Association (ADA) guidelines.            Please call with questions or contact us using [employer].    Sincerely,        Electronically signed by Juan Wu MD

## 2021-06-20 NOTE — LETTER
Letter by Juan Wu MD at      Author: Juan Wu MD Service: -- Author Type: --    Filed:  Encounter Date: 7/3/2020 Status: (Other)         Nate North  [address] [facility] [place] [address]             July 3, 2020         Dear Mr. North,    Below are the results from your recent visit: Labs ordered by your neurologist.    Resulted Orders   Ammonia   Result Value Ref Range    Ammonia 31 11 - 35 umol/L   Thiamin (Vitamin B1), Whole Blood   Result Value Ref Range    Vitamin B1, Whole Blood 85 70 - 180 nmol/L      Comment:      INTERPRETIVE INFORMATION: Vitamin B1, Whole Blood    This assay measures the concentration of thiamine diphosphate   (TDP), the primary active form of vitamin B1. Approximately 90   percent of vitamin B1 present in whole blood is TDP. Thiamine and   thiamine monophosphate, which comprise the remaining 10 percent,   are not measured.    Test developed and characteristics determined by [facility]. See Compliance Statement B: [facility]  Performed by [facility],  [address] [place] [id number] [phone]  [email or web address], Jacky Landrum MD, Lab. Director   Vitamin B12   Result Value Ref Range    Vitamin B-12 303 213 - 816 pg/mL   Thyroid Stimulating Hormone (TSH)   Result Value Ref Range    TSH 0.95 0.30 - 5.00 uIU/mL   T4, Free   Result Value Ref Range    Free T4 0.9 0.7 - 1.8 ng/dL            Please call with questions or contact us using [employer].    Sincerely,        Electronically signed by Juan Wu MD

## 2021-06-20 NOTE — LETTER
Letter by Juan Wu MD at      Author: Juan Wu MD Service: -- Author Type: --    Filed:  Encounter Date: 8/1/2020 Status: (Other)         Nate North  [address] [facility] [place] [address]             August 1, 2020         Dear Mr. North,    Below are the results from your recent visit: Sugar is good at 86, no diabetes.  Potassium is just slightly elevated at 5.4, not likely to be of any significance will recheck next visit, the remaining kidney tests are normal.  Cholesterol is very well controlled.  The PSA or prostate test is normal.      Resulted Orders   Basic Metabolic Panel   Result Value Ref Range    Sodium 140 136 - 145 mmol/L    Potassium 5.4 (H) 3.5 - 5.0 mmol/L    Chloride 103 98 - 107 mmol/L    CO2 25 22 - 31 mmol/L    Anion Gap, Calculation 12 5 - 18 mmol/L    Glucose 86 70 - 125 mg/dL    Calcium 10.3 8.5 - 10.5 mg/dL    BUN 16 8 - 28 mg/dL    Creatinine 0.85 0.70 - 1.30 mg/dL    GFR MDRD Af Amer >60 >60 mL/min/1.73m2    GFR MDRD Non Af Amer >60 >60 mL/min/1.73m2    Narrative    Fasting Glucose reference range is 70-99 mg/dL per  American Diabetes Association (ADA) guidelines.   Lipid Cascade   Result Value Ref Range    Cholesterol 196 <=199 mg/dL    Triglycerides 56 <=149 mg/dL    HDL Cholesterol 85 >=40 mg/dL    LDL Calculated 100 <=129 mg/dL    Patient Fasting > 8hrs? Yes    PSA (Prostatic-Specific Antigen), Annual Screen   Result Value Ref Range    PSA 1.3 0.0 - 6.5 ng/mL    Narrative    Method is Abbott Prostate-Specific Antigen (PSA)  Standard-WHO 1st International (90:10)            Please call with questions or contact us using [employer].    Sincerely,        Electronically signed by Juan Wu MD

## 2021-06-20 NOTE — PROGRESS NOTES
[facility] Daily Progress     Patient Name: Nate North  Date: 9/10/2018  Visit #: 2/6  Evaluation date:  9/6/18  Referral Diagnosis: Right knee pain  Referring provider: Juan Wu MD  Visit Diagnosis:     ICD-10-CM    1. Acute pain of right knee M25.561    2. Acute pain of left knee M25.562    3. Knee stiffness, right M25.661    4. Decreased strength R53.1        Precautions / Restrictions : degenerative cervical disc, BPH, prediabetes,      Assessment:     Response to Intervention:  Tolerated well with     Symptoms are consistent with:  right knee patellofemoral knee pain, with restricted quad, hamstring and gastroc secondary to likely muscular strain while chasing his dog.  Patient is appropriate to continue with skilled physical therapy intervention, as indicated by initial plan of care.    Goal Status:  Pt. will demonstrate/verbalize independence in self-management of condition in : 6 weeks  Pt. will be independent with home exercise program in : 6 weeks  Pt. will improve posture : and demonstrate posture with minimal to no cuing;in 6 weeks  Pt. will be able to walk : 6 blocks;with no pain;with less difficulty;for exercise/recreation;in 6 weeks  Patient will stand : 60 minutes;with less pain;with less difficultty;in 6 weeks  Patient will ascend / descend: stairs;with recipricol gait;with no pain;with less difficulty;in 6 weeks  Patient will transfer: sit/stand;with less pain;with less difficulty;in 6 weeks  No Data Recorded  Other functional progress:          Plan / Patient Education:     Continue with initial plan of care.  Progress with home program as tolerated.      Subjective:     Pain Rating:  Resting 0  Activity:  0    Response to last treatment: feels better  HEP- Frequency: 2x/day, Questions or difficulties:  none.    Patient reports:      Able to go up and down the stairs without handrails.    Went for a longer walk without increased pain.      Objective:       Patella  "hypomobility    Manual Therapy  MFR layers 1-3 right TFL, quad, hamstrings  Manual therapy:  right patella inferior mobilization for knee flexion    Rate/grade Target  Direction  Relative movement Location in range Patient position   2,3 Superior pole of patella Inferior    Inferior glide of patella in the femoral groove. Varying degrees of knee flexion from full extension to end-range flexion. Supine        Manual therapy:  right patella medial mobilization    Rate/grade Target  Direction  Relative movement Location in range Patient position   2,3 Lateral border of patella Medial   Medial glide of patella on the femoral groove. Full knee extension  Supine            Exercises:  Exercise #1: gastroc stretch   Comment #1: 30\" x 2 bilateral  Exercise #2: hamstring stretch  Comment #2: 30\" x 2 bilateral  Exercise #3: piriformis stretch  Comment #3: 30\" x 2 bilateral HEP and manual  Exercise #4: LAQ  Comment #4: 5\" x 10 bilateral  Exercise #5: sit to stand without UE support  Comment #5: 3 x 10 reps  Exercise #6: standing hip abduction   Comment #6: 3 x 10 reps bilateral       Treatment Today    TREATMENT MINUTES COMMENTS   Evaluation     Self-care/ Home management     Manual therapy 10 See above.   Neuromuscular Re-education     Therapeutic Activity     Therapeutic Exercises 15 See exercise flow-sheet for details.    Gait training     Modality__________________                Total 25    Blank areas are intentional and mean the treatment did not include these items.       Nate Victoria, PT  9/10/2018     "

## 2021-07-03 NOTE — ADDENDUM NOTE
Addendum Note by Mary Ellen Lenz CMA at 12/8/2017 10:10 AM     Author: Mary Ellen Lenz CMA Service: -- Author Type: Certified Medical Assistant    Filed: 12/8/2017 10:10 AM Encounter Date: 12/6/2017 Status: Signed    : Mary Ellen Lenz CMA (Certified Medical Assistant)    Addended by: MARY ELLEN LENZ on: 12/8/2017 10:10 AM        Modules accepted: Orders

## 2021-07-18 ENCOUNTER — HOSPITAL ENCOUNTER (EMERGENCY)
Dept: CT IMAGING | Facility: CLINIC | Age: 71
End: 2021-07-18
Attending: EMERGENCY MEDICINE
Payer: COMMERCIAL

## 2021-07-18 ENCOUNTER — HOSPITAL ENCOUNTER (EMERGENCY)
Facility: CLINIC | Age: 71
Discharge: HOME | End: 2021-07-18
Attending: EMERGENCY MEDICINE | Admitting: EMERGENCY MEDICINE
Payer: COMMERCIAL

## 2021-07-18 VITALS
HEART RATE: 65 BPM | BODY MASS INDEX: 24.24 KG/M2 | WEIGHT: 142 LBS | TEMPERATURE: 98 F | RESPIRATION RATE: 18 BRPM | DIASTOLIC BLOOD PRESSURE: 72 MMHG | HEIGHT: 64 IN | SYSTOLIC BLOOD PRESSURE: 125 MMHG | OXYGEN SATURATION: 93 %

## 2021-07-18 DIAGNOSIS — N30.00 ACUTE CYSTITIS WITHOUT HEMATURIA: ICD-10-CM

## 2021-07-18 LAB
ALBUMIN SERPL-MCNC: 4.3 G/DL (ref 3.5–5)
ALBUMIN UR-MCNC: NEGATIVE MG/DL
ALP SERPL-CCNC: 75 U/L (ref 45–120)
ALT SERPL W P-5'-P-CCNC: 25 U/L (ref 0–45)
ANION GAP SERPL CALCULATED.3IONS-SCNC: 12 MMOL/L (ref 5–18)
APPEARANCE UR: CLEAR
AST SERPL W P-5'-P-CCNC: 23 U/L (ref 0–40)
BASOPHILS # BLD AUTO: 0 10E3/UL (ref 0–0.2)
BASOPHILS NFR BLD AUTO: 1 %
BILIRUB SERPL-MCNC: 0.7 MG/DL (ref 0–1)
BILIRUB UR QL STRIP: NEGATIVE
BUN SERPL-MCNC: 13 MG/DL (ref 8–28)
C REACTIVE PROTEIN LHE: 0.3 MG/DL (ref 0–0.8)
CALCIUM SERPL-MCNC: 9.4 MG/DL (ref 8.5–10.5)
CHLORIDE BLD-SCNC: 106 MMOL/L (ref 98–107)
CO2 SERPL-SCNC: 19 MMOL/L (ref 22–31)
COLOR UR AUTO: COLORLESS
CREAT SERPL-MCNC: 0.89 MG/DL (ref 0.7–1.3)
EOSINOPHIL # BLD AUTO: 0.1 10E3/UL (ref 0–0.7)
EOSINOPHIL NFR BLD AUTO: 1 %
ERYTHROCYTE [DISTWIDTH] IN BLOOD BY AUTOMATED COUNT: 13.5 % (ref 10–15)
GFR SERPL CREATININE-BSD FRML MDRD: 85 ML/MIN/1.73M2
GLUCOSE BLD-MCNC: 142 MG/DL (ref 70–125)
GLUCOSE UR STRIP-MCNC: NEGATIVE MG/DL
HCT VFR BLD AUTO: 40.4 % (ref 40–53)
HGB BLD-MCNC: 13.8 G/DL (ref 13.3–17.7)
HGB UR QL STRIP: ABNORMAL
IMM GRANULOCYTES # BLD: 0 10E3/UL
IMM GRANULOCYTES NFR BLD: 1 %
KETONES UR STRIP-MCNC: NEGATIVE MG/DL
LACTATE SERPL-SCNC: 1.6 MMOL/L (ref 0.7–2)
LACTATE SERPL-SCNC: 2.8 MMOL/L (ref 0.7–2)
LEUKOCYTE ESTERASE UR QL STRIP: NEGATIVE
LIPASE SERPL-CCNC: 28 U/L (ref 0–52)
LYMPHOCYTES # BLD AUTO: 0.8 10E3/UL (ref 0.8–5.3)
LYMPHOCYTES NFR BLD AUTO: 15 %
MCH RBC QN AUTO: 35.6 PG (ref 26.5–33)
MCHC RBC AUTO-ENTMCNC: 34.2 G/DL (ref 31.5–36.5)
MCV RBC AUTO: 104 FL (ref 78–100)
MONOCYTES # BLD AUTO: 0.5 10E3/UL (ref 0–1.3)
MONOCYTES NFR BLD AUTO: 9 %
MUCOUS THREADS #/AREA URNS LPF: PRESENT /LPF
NEUTROPHILS # BLD AUTO: 4 10E3/UL (ref 1.6–8.3)
NEUTROPHILS NFR BLD AUTO: 73 %
NITRATE UR QL: NEGATIVE
NRBC # BLD AUTO: 0 10E3/UL
NRBC BLD AUTO-RTO: 0 /100
PH UR STRIP: 5 [PH] (ref 5–7)
PLATELET # BLD AUTO: 259 10E3/UL (ref 150–450)
POTASSIUM BLD-SCNC: 4.1 MMOL/L (ref 3.5–5)
PROT SERPL-MCNC: 7.7 G/DL (ref 6–8)
RBC # BLD AUTO: 3.88 10E6/UL (ref 4.4–5.9)
RBC URINE: 2 /HPF
SODIUM SERPL-SCNC: 137 MMOL/L (ref 136–145)
SP GR UR STRIP: <1.005 (ref 1–1.03)
SQUAMOUS EPITHELIAL: <1 /HPF
UROBILINOGEN UR STRIP-MCNC: <2 MG/DL
WBC # BLD AUTO: 5.4 10E3/UL (ref 4–11)
WBC URINE: 2 /HPF

## 2021-07-18 PROCEDURE — 83690 ASSAY OF LIPASE: CPT | Performed by: EMERGENCY MEDICINE

## 2021-07-18 PROCEDURE — 74177 CT ABD & PELVIS W/CONTRAST: CPT

## 2021-07-18 PROCEDURE — 96365 THER/PROPH/DIAG IV INF INIT: CPT

## 2021-07-18 PROCEDURE — 250N000011 HC RX IP 250 OP 636: Performed by: EMERGENCY MEDICINE

## 2021-07-18 PROCEDURE — 81001 URINALYSIS AUTO W/SCOPE: CPT | Performed by: EMERGENCY MEDICINE

## 2021-07-18 PROCEDURE — 86141 C-REACTIVE PROTEIN HS: CPT | Performed by: EMERGENCY MEDICINE

## 2021-07-18 PROCEDURE — 36415 COLL VENOUS BLD VENIPUNCTURE: CPT | Performed by: EMERGENCY MEDICINE

## 2021-07-18 PROCEDURE — 83605 ASSAY OF LACTIC ACID: CPT | Performed by: EMERGENCY MEDICINE

## 2021-07-18 PROCEDURE — 85025 COMPLETE CBC W/AUTO DIFF WBC: CPT | Performed by: EMERGENCY MEDICINE

## 2021-07-18 PROCEDURE — 99285 EMERGENCY DEPT VISIT HI MDM: CPT | Mod: 25

## 2021-07-18 PROCEDURE — 96375 TX/PRO/DX INJ NEW DRUG ADDON: CPT

## 2021-07-18 PROCEDURE — 80053 COMPREHEN METABOLIC PANEL: CPT | Performed by: EMERGENCY MEDICINE

## 2021-07-18 PROCEDURE — 258N000003 HC RX IP 258 OP 636: Performed by: EMERGENCY MEDICINE

## 2021-07-18 PROCEDURE — 96361 HYDRATE IV INFUSION ADD-ON: CPT

## 2021-07-18 PROCEDURE — 36592 COLLECT BLOOD FROM PICC: CPT | Performed by: EMERGENCY MEDICINE

## 2021-07-18 RX ORDER — CEFTRIAXONE 1 G/1
1 INJECTION, POWDER, FOR SOLUTION INTRAMUSCULAR; INTRAVENOUS ONCE
Status: COMPLETED | OUTPATIENT
Start: 2021-07-18 | End: 2021-07-18

## 2021-07-18 RX ORDER — IOPAMIDOL 755 MG/ML
100 INJECTION, SOLUTION INTRAVASCULAR ONCE
Status: COMPLETED | OUTPATIENT
Start: 2021-07-18 | End: 2021-07-18

## 2021-07-18 RX ORDER — ONDANSETRON 8 MG/1
8 TABLET, ORALLY DISINTEGRATING ORAL EVERY 8 HOURS PRN
Qty: 12 TABLET | Refills: 0 | Status: SHIPPED | OUTPATIENT
Start: 2021-07-18 | End: 2021-08-02

## 2021-07-18 RX ORDER — ONDANSETRON 2 MG/ML
4 INJECTION INTRAMUSCULAR; INTRAVENOUS ONCE
Status: COMPLETED | OUTPATIENT
Start: 2021-07-18 | End: 2021-07-18

## 2021-07-18 RX ORDER — CIPROFLOXACIN 500 MG/1
500 TABLET, FILM COATED ORAL 2 TIMES DAILY
Qty: 10 TABLET | Refills: 0 | Status: SHIPPED | OUTPATIENT
Start: 2021-07-18 | End: 2021-07-23

## 2021-07-18 RX ORDER — MORPHINE SULFATE 4 MG/ML
4 INJECTION, SOLUTION INTRAMUSCULAR; INTRAVENOUS ONCE
Status: COMPLETED | OUTPATIENT
Start: 2021-07-18 | End: 2021-07-18

## 2021-07-18 RX ADMIN — SODIUM CHLORIDE 1000 ML: 9 INJECTION, SOLUTION INTRAVENOUS at 04:26

## 2021-07-18 RX ADMIN — ONDANSETRON 4 MG: 2 INJECTION INTRAMUSCULAR; INTRAVENOUS at 02:44

## 2021-07-18 RX ADMIN — SODIUM CHLORIDE 500 ML: 9 INJECTION, SOLUTION INTRAVENOUS at 02:57

## 2021-07-18 RX ADMIN — MORPHINE SULFATE 4 MG: 4 INJECTION, SOLUTION INTRAMUSCULAR; INTRAVENOUS at 02:45

## 2021-07-18 RX ADMIN — CEFTRIAXONE 1 G: 1 INJECTION, POWDER, FOR SOLUTION INTRAMUSCULAR; INTRAVENOUS at 05:17

## 2021-07-18 RX ADMIN — IOPAMIDOL 100 ML: 755 INJECTION, SOLUTION INTRAVENOUS at 04:12

## 2021-07-18 ASSESSMENT — ENCOUNTER SYMPTOMS
CONSTIPATION: 0
VOMITING: 0
ABDOMINAL PAIN: 1
FREQUENCY: 1
NAUSEA: 0

## 2021-07-18 ASSESSMENT — MIFFLIN-ST. JEOR: SCORE: 1305.11

## 2021-07-18 NOTE — DISCHARGE INSTRUCTIONS
Please take antibiotics as prescribed.  I did prescribe you some antinausea medication should you need it.  Please return for any new or worsening symptoms.

## 2021-07-18 NOTE — ED PROVIDER NOTES
EMERGENCY DEPARTMENT ENCOUNTER      NAME: Nate North  AGE: 70 year old male  YOB: 1950  MRN: [id number]  EVALUATION DATE & TIME: 7/18/2021  1:57 AM    PCP: Juan Wu    ED PROVIDER: Myriam Stack M.D.      Chief Complaint   Patient presents with     Abdominal Pain         FINAL IMPRESSION:  No diagnosis found.    MEDICAL DECISION MAKING:    Pertinent Labs & Imaging studies reviewed. (See chart for details)  ED Course as of Jul 18 0508   Sun Jul 18, 2021   0502 Afebrile.  Vital signs here initially with hypertension likely secondary to discomfort.  Patient is coming in for evaluation of right lower quadrant abdominal pain.  History of hernia repair in the past.  Started 10 PM yesterday evening.  Started to get slightly nauseous here.  No fevers or chills.  No diarrhea, did have a bowel movement when abdominal pain started which had no effect on the pain.  Is been having some difficulty urinating but he states this is constant for him.Physical exam for patient here sitting in bed no acute cardiopulmonary distress.  Heart regular rate and rhythm, lungs are clear to auscultation bilaterally.  He has mild voluntary guarding on palpation of his abdomen with no significant increase of tenderness in his right lower quadrant but does have seemingly mild tenderness diffusely.  No significant flank pain.  Mild right-sided CVA tenderness but otherwise unremarkable.Patient here with possible gallbladder etiology versus hernia versus UTI versus Roosevelt versus stone.  We will plan for labs, fluids, pain management, antinausea medication and CT scan abdomen pelvis for further evaluation.      0503 Patient's labs back here with lactic acid elevated slightly 2.8, given 1.5 L of fluid and then will recheck.  No significant leukocytosis, CRP is negative.  Remainder labs look unremarkable.          CT scan here reveals possible UTI.  Did repeat lactic, this is trending down.  Was started on a dose of Rocephin.   Was finally able to give us a urine sample.  Likely treat for possible early Roosevelt and discharged with follow-up with primary care.  Patient is in agreement with plan.    Critical care: 0 minutes excluding separately billable procedures.  Includes bedside management, time reviewing test results, review of records, discussing the case with staff, documenting the medical record and time spent with family members (or surrogate decision makers) discussing specific treatment issues.          ED COURSE:  2:15 AM I met with the patient, obtained history, performed an initial exam, and discussed options and plan for diagnostics and treatment here in the ED.   5:05 AM I went to update the patient on results.     The importance of close follow up was discussed. We reviewed warning signs and symptoms, and I instructed Mr. North to return to the emergency department immediately if he develops any new or worsening symptoms. I provided additional verbal discharge instructions. Mr. North expressed understanding and agreement with this plan of care, his questions were answered, and he was discharged in stable condition.     MEDICATIONS GIVEN IN THE EMERGENCY:  Medications   iohexol (OMNIPAQUE) solution 25 mL (has no administration in time range)   cefTRIAXone (ROCEPHIN) 1 g vial to attach to  mL bag for ADULTS or NS 50 mL bag for PEDS (has no administration in time range)   0.9% sodium chloride BOLUS (500 mLs Intravenous New Bag 7/18/21 0257)   morphine (PF) injection 4 mg (4 mg Intravenous Given 7/18/21 0245)   ondansetron (ZOFRAN) injection 4 mg (4 mg Intravenous Given 7/18/21 0244)   0.9% sodium chloride BOLUS (1,000 mLs Intravenous New Bag 7/18/21 0426)       NEW PRESCRIPTIONS STARTED AT TODAY'S ER VISIT:  New Prescriptions    No medications on file          =================================================================    HPI    Patient information was obtained from: Patient    Use of : N/PEDRO JACOBO  Mary Anne is a 70 year old male with a history of anxiety, who presents for evaluation of abdominal pain.    The patient reports onset of RLQ abdominal pain about four hours ago.  He reports the pain is localized and denies any provoking factors.  He had a bowel movement which did not resolve the pain.  He does note a inguinal hernia in the past, but he states this feels different.  He has decreased urinary output which is a chronic issue for him.  No nausea, emesis, or other complaints at this time.      REVIEW OF SYSTEMS   Review of Systems   Gastrointestinal: Positive for abdominal pain. Negative for constipation, nausea and vomiting.   Genitourinary: Positive for frequency (decreased, chronic).   All other systems reviewed and are negative.       PAST MEDICAL HISTORY:  Past Medical History:   Diagnosis Date     Anxiety     Work related. Minor attacks lasting 10-15 seconds. Substantial improvement since alf. Sense of disconnectedness        PAST SURGICAL HISTORY:  Past Surgical History:   Procedure Laterality Date     ARTHROSCOPY SHOULDER ROTATOR CUFF REPAIR Right 2007     INGUINAL HERNIA REPAIR Right      VASECTOMY  1997            CURRENT MEDICATIONS:      Current Facility-Administered Medications:      cefTRIAXone (ROCEPHIN) 1 g vial to attach to  mL bag for ADULTS or NS 50 mL bag for PEDS, 1 g, Intravenous, Once, Nola Stack MD    Current Outpatient Medications:      lisinopril (ZESTRIL) 10 MG tablet, Take 10 mg by mouth daily , Disp: , Rfl:      sertraline (ZOLOFT) 50 MG tablet, Take 50 mg by mouth daily, Disp: , Rfl:     ALLERGIES:  No Known Allergies    FAMILY HISTORY:  Family History   Problem Relation Age of Onset     Cancer Mother      Benign prostatic hyperplasia Father      Skin Cancer Father         Not Melanoma     Kidney failure Father      Ovarian Cancer Mother      Glaucoma Sister      Skin Cancer Brother      Glaucoma Brother      Glaucoma Sister        SOCIAL HISTORY:   Social  "History     Socioeconomic History     Marital status:      Spouse name: Not on file     Number of children: Not on file     Years of education: Not on file     Highest education level: Not on file   Occupational History     Not on file   Tobacco Use     Smoking status: Former Smoker     Packs/day: 0.50     Years: 5.00     Pack years: 2.50     Types: Cigars, Cigarettes, Cigarettes     Quit date: 1963     Years since quittin.2     Smokeless tobacco: Never Used     Tobacco comment: smoked a few years in high school   Substance and Sexual Activity     Alcohol use: Yes     Alcohol/week: 14.0 standard drinks     Comment: Daily     Drug use: No     Sexual activity: Yes     Partners: Female   Other Topics Concern     Parent/sibling w/ CABG, MI or angioplasty before 65F 55M? Not Asked   Social History Narrative    Diet-healthy    Exercise-walking 3-4x week     Social Determinants of Health     Financial Resource Strain:      Difficulty of Paying Living Expenses:    Food Insecurity:      Worried About Running Out of Food in the Last Year:      Ran Out of Food in the Last Year:    Transportation Needs:      Lack of Transportation (Medical):      Lack of Transportation (Non-Medical):    Physical Activity:      Days of Exercise per Week:      Minutes of Exercise per Session:    Stress:      Feeling of Stress :    Social Connections:      Frequency of Communication with Friends and Family:      Frequency of Social Gatherings with Friends and Family:      Attends [employer] Services:      Active Member of Clubs or Organizations:      Attends Club or Organization Meetings:      Marital Status:    Intimate Partner Violence:      Fear of Current or Ex-Partner:      Emotionally Abused:      Physically Abused:      Sexually Abused:        PHYSICAL EXAM:    Vitals: /74   Pulse 74   Temp 98  F (36.7  C) (Oral)   Resp 18   Ht 1.626 m (5' 4\")   Wt 64.4 kg (142 lb)   SpO2 91%   BMI 24.37 kg/m     General:. Alert " and interactive, comfortable appearing. patient here sitting in bed no acute cardiopulmonary distress.  HENT: Oropharynx without erythema or exudates. MMM.  TMs clear bilaterally.  Eyes: Pupils mid-sized and equally reactive.   Neck: Full AROM.  No midline tenderness to palpation.  Cardiovascular: Regular rate and rhythm. Peripheral pulses 2+ bilaterally.  Chest/Pulmonary: Normal work of breathing. Lung sounds clear and equal throughout, no wheezes or crackles. No chest wall tenderness or deformities.  Abdomen: Soft, nondistended.  He has mild voluntary guarding on palpation of his abdomen with no significant increase of tenderness in his right lower quadrant but does have seemingly mild tenderness diffusely.  No significant flank pain.  Back/Spine: No midline tenderness.  Mild CVA tenderness.  Extremities: Normal ROM of all major joints. No lower extremity edema.   Skin: Warm and dry. Normal skin color.   Neuro: Speech clear. CNs grossly intact. Moves all extremities appropriately. Strength and sensation grossly intact to all extremities.   Psych: Normal affect/mood, cooperative, memory appropriate.     LAB:  All pertinent labs reviewed and interpreted.  Labs Ordered and Resulted from Time of ED Arrival Up to the Time of Departure from the ED   COMPREHENSIVE METABOLIC PANEL - Abnormal; Notable for the following components:       Result Value    Carbon Dioxide (CO2) 19 (*)     Glucose 142 (*)     All other components within normal limits   LACTIC ACID WHOLE BLOOD - Abnormal; Notable for the following components:    Lactic Acid 2.8 (*)     All other components within normal limits   CBC WITH PLATELETS AND DIFFERENTIAL - Abnormal; Notable for the following components:    RBC Count 3.88 (*)      (*)     MCH 35.6 (*)     All other components within normal limits   LIPASE - Normal   CRP INFLAMMATION - Normal   LACTIC ACID WHOLE BLOOD   ROUTINE UA WITH MICROSCOPIC REFLEX TO CULTURE   CBC WITH PLATELETS &  DIFFERENTIAL    Narrative:     The following orders were created for panel order CBC with platelets + differential.  Procedure                               Abnormality         Status                     ---------                               -----------         ------                     CBC with platelets and d...[[id number]]  Abnormal            Final result                 Please view results for these tests on the individual orders.       RADIOLOGY:  CT Abdomen Pelvis w Contrast   Final Result   IMPRESSION:    1.  Findings consistent with an ascending urinary tract infection on the right. No specific evidence for pyelonephritis at this time. Clinical correlation recommended.   2.  Normal appendix.   3.  Colonic diverticulosis without evidence for diverticulitis.   4.  Hepatic steatosis.          PROCEDURES:   None      I, Antoni Clark, am serving as a scribe to document services personally performed by Dr. Myriam Stack  based on my observation and the provider's statements to me. I, Myriam Stack MD attest that Antoni Clark is acting in a scribe capacity, has observed my performance of the services and has documented them in accordance with my direction.      Myriam Stack M.D.  Emergency Medicine  [facility] EMERGENCY ROOM  [address] [facility] [id number] [phone]  Dept: [phone]      Nola Stack MD  07/18/21 0549

## 2021-07-18 NOTE — ED NOTES
Pts sats decrease into the mid 80s on RA while he is resting, good pleph, pt wakes appropriately. He states his pain is better at this time.

## 2021-07-18 NOTE — ED TRIAGE NOTES
Patient arrives with c/o lower middle abdominal pain that began around 2230. Reports pain as cramping. Last BM 2300 and 0100.   Reports limited urination today.     Hx of inguinal hernia mesh repair.     Denies blood in stool. Denies fever or chills.     Rates pain 8/10

## 2021-07-19 ENCOUNTER — DOCUMENTATION ONLY (OUTPATIENT)
Dept: OTHER | Facility: CLINIC | Age: 71
End: 2021-07-19

## 2021-07-19 ENCOUNTER — TELEPHONE (OUTPATIENT)
Dept: FAMILY MEDICINE | Facility: CLINIC | Age: 71
End: 2021-07-19

## 2021-07-19 NOTE — TELEPHONE ENCOUNTER
I called and spoke with patient, relayed documentation, patient verbalized understanding, no furtherquestions/concerns at this time.    Scheduled tomorrow with pcp.

## 2021-07-19 NOTE — TELEPHONE ENCOUNTER
Patient needs to be seen for a hospital follow up at [facility] on 7/18/2021 for UTI. Needs a follow up within 3 days. Please call - ok to see anyone. Ok to leave detailed message.

## 2021-07-20 ENCOUNTER — OFFICE VISIT (OUTPATIENT)
Dept: FAMILY MEDICINE | Facility: CLINIC | Age: 71
End: 2021-07-20
Payer: COMMERCIAL

## 2021-07-20 VITALS
SYSTOLIC BLOOD PRESSURE: 159 MMHG | BODY MASS INDEX: 23.93 KG/M2 | DIASTOLIC BLOOD PRESSURE: 88 MMHG | WEIGHT: 139.4 LBS | HEART RATE: 87 BPM

## 2021-07-20 DIAGNOSIS — Z12.11 SCREEN FOR COLON CANCER: Primary | ICD-10-CM

## 2021-07-20 DIAGNOSIS — N39.0 URINARY TRACT INFECTION WITHOUT HEMATURIA, SITE UNSPECIFIED: ICD-10-CM

## 2021-07-20 PROCEDURE — 99213 OFFICE O/P EST LOW 20 MIN: CPT | Performed by: FAMILY MEDICINE

## 2021-07-20 NOTE — PROGRESS NOTES
ASSESSMENT:  1. Screen for colon cancer  Patient has an upcoming colonoscopy    2. Urinary tract infection   Recent urinary tract infection now essentially resolved        PLAN:  1.  Patient to finish out the antibiotics as prescribed  2.  Patient has an upcoming colonoscopy  3.  Anticipate seeing the patient in the next 1 to 2 months for Medicare wellness exam       No orders of the defined types were placed in this encounter.    There are no discontinued medications.    No follow-ups on file.    CHIEF COMPLAINT:  chief complaint    SUBJECTIVE:  Nate is a 70 year old male who comes in for follow-up of a urinary tract infection.  Patient had right lower abdominal pain, his urinalysis is not strongly suggestive of an infection at all however CT scan indicated an ascending infection he was treated with ciprofloxacin he is essentially had a resolution of symptoms.    I explained to the patient that we do not know why or how he got a bladder infection on the other hand is not a sign of any underlying disorder either.  I would not advise any change in his usual level of activity, he generally does remain well-hydrated and overall does have good health habits.    Scheduled for his colonoscopy this coming Friday.      REVIEW OF SYSTEMS:      All other systems are negative.    PFSH:  Immunization History   Administered Date(s) Administered     COVID-19,PF,Pfizer 03/12/2021, 04/02/2021     DT (PEDS <7y) 11/16/2000, 10/03/2001     FLU 6-35 months 10/22/2010, 09/29/2011, 09/20/2012, 09/25/2013     Flu, Unspecified 12/23/2008, 09/22/2009, 09/23/2009     Influenza (High Dose) 3 valent vaccine 03/18/2016, 09/28/2017, 12/13/2018     Influenza (IIV3) PF 11/19/2005, 11/19/2005, 12/23/2008, 12/23/2008, 09/22/2009, 09/22/2009     Influenza Quad, Recombinant, p-free (RIV4) 11/29/2019     Influenza Vaccine, 6+MO IM (QUADRIVALENT W/PRESERVATIVES) 11/29/2019, 11/29/2019     Influenza, Quad, High Dose, Pf, 65yr + 10/23/2020     Pneumo  Conj 13-V (2010&after) 2015     Pneumococcal 23 valent 2016     Td (Adult), Adsorbed 10/03/2001     Td,adult,historic,unspecified 10/03/2001     Tdap (Adacel,Boostrix) 2009, 2020     Zoster vaccine recombinant adjuvanted (SHINGRIX) 2018, 2019, 2019, 2019, 2019     Zoster vaccine, live 2013     Social History     Socioeconomic History     Marital status:      Spouse name: Not on file     Number of children: Not on file     Years of education: Not on file     Highest education level: Not on file   Occupational History     Not on file   Tobacco Use     Smoking status: Former Smoker     Packs/day: 0.50     Years: 5.00     Pack years: 2.50     Types: Cigars, Cigarettes, Cigarettes     Quit date: 1963     Years since quittin.2     Smokeless tobacco: Never Used     Tobacco comment: smoked a few years in high school   Substance and Sexual Activity     Alcohol use: Yes     Alcohol/week: 14.0 standard drinks     Comment: Daily     Drug use: No     Sexual activity: Yes     Partners: Female   Other Topics Concern     Parent/sibling w/ CABG, MI or angioplasty before 65F 55M? Not Asked   Social History Narrative    Diet-healthy            Exercise-walking 3-4x week     Social Determinants of Health     Financial Resource Strain:      Difficulty of Paying Living Expenses:    Food Insecurity:      Worried About Running Out of Food in the Last Year:      Ran Out of Food in the Last Year:    Transportation Needs:      Lack of Transportation (Medical):      Lack of Transportation (Non-Medical):    Physical Activity:      Days of Exercise per Week:      Minutes of Exercise per Session:    Stress:      Feeling of Stress :    Social Connections:      Frequency of Communication with Friends and Family:      Frequency of Social Gatherings with Friends and Family:      Attends [employer] Services:      Active Member of Clubs or Organizations:      Attends Club or  Organization Meetings:      Marital Status:    Intimate Partner Violence:      Fear of Current or Ex-Partner:      Emotionally Abused:      Physically Abused:      Sexually Abused:      Past Medical History:   Diagnosis Date     Anxiety     Work related. Minor attacks lasting 10-15 seconds. Substantial improvement since care home. Sense of disconnectedness      Family History   Problem Relation Age of Onset     Cancer Mother      Benign prostatic hyperplasia Father      Skin Cancer Father         Not Melanoma     Kidney failure Father      Ovarian Cancer Mother      Glaucoma Sister      Skin Cancer Brother      Glaucoma Brother      Glaucoma Sister        MEDICATIONS:  Current Outpatient Medications   Medication Sig Dispense Refill     ciprofloxacin (CIPRO) 500 MG tablet Take 1 tablet (500 mg) by mouth 2 times daily for 5 days 10 tablet 0     lisinopril (ZESTRIL) 10 MG tablet Take 10 mg by mouth daily        ondansetron (ZOFRAN-ODT) 8 MG ODT tab Take 1 tablet (8 mg) by mouth every 8 hours as needed for nausea 12 tablet 0     sertraline (ZOLOFT) 50 MG tablet Take 50 mg by mouth daily         TOBACCO USE:  History   Smoking Status     Former Smoker     Packs/day: 0.50     Years: 5.00     Types: Cigars, Cigarettes, Cigarettes     Quit date: 4/27/1963   Smokeless Tobacco     Never Used     Comment: smoked a few years in high school       VITALS:  Vitals:    07/20/21 1308   BP: (!) 159/88   Pulse: 87   Weight: 63.2 kg (139 lb 6.4 oz)     Wt Readings from Last 3 Encounters:   07/20/21 63.2 kg (139 lb 6.4 oz)   07/18/21 64.4 kg (142 lb)   09/18/20 62.6 kg (138 lb)       PHYSICAL EXAM:  Constitutional:   Reveals a male who appears healthy.  Vitals: per nursing notes.  Eyes:  EOMs full, PERRL  Musculoskeletal: No peripheral swelling.  Neuro:  Alert and oriented. Cranial nerves, motor, sensory exams are intact.  No gross focal deficits.  Psychiatric:  Memory intact, mood appropriate.    QUALITY MEASURES:      DATA  REVIEWED:

## 2021-07-23 ENCOUNTER — TRANSFERRED RECORDS (OUTPATIENT)
Dept: HEALTH INFORMATION MANAGEMENT | Facility: CLINIC | Age: 71
End: 2021-07-23

## 2021-07-30 ASSESSMENT — ACTIVITIES OF DAILY LIVING (ADL): CURRENT_FUNCTION: NO ASSISTANCE NEEDED

## 2021-08-02 ENCOUNTER — OFFICE VISIT (OUTPATIENT)
Dept: FAMILY MEDICINE | Facility: CLINIC | Age: 71
End: 2021-08-02
Payer: COMMERCIAL

## 2021-08-02 VITALS
DIASTOLIC BLOOD PRESSURE: 74 MMHG | OXYGEN SATURATION: 95 % | HEART RATE: 70 BPM | BODY MASS INDEX: 23.9 KG/M2 | HEIGHT: 64 IN | SYSTOLIC BLOOD PRESSURE: 118 MMHG | WEIGHT: 140 LBS

## 2021-08-02 DIAGNOSIS — I10 HYPERTENSION, UNSPECIFIED TYPE: ICD-10-CM

## 2021-08-02 DIAGNOSIS — Z00.00 WELLNESS EXAMINATION: ICD-10-CM

## 2021-08-02 DIAGNOSIS — Z12.5 SCREENING FOR PROSTATE CANCER: ICD-10-CM

## 2021-08-02 DIAGNOSIS — Z12.11 SCREEN FOR COLON CANCER: Primary | ICD-10-CM

## 2021-08-02 DIAGNOSIS — G47.00 INSOMNIA, UNSPECIFIED TYPE: ICD-10-CM

## 2021-08-02 DIAGNOSIS — L98.9 SKIN LESION: ICD-10-CM

## 2021-08-02 DIAGNOSIS — K21.9 GASTROESOPHAGEAL REFLUX DISEASE WITHOUT ESOPHAGITIS: ICD-10-CM

## 2021-08-02 DIAGNOSIS — F32.A DEPRESSION, UNSPECIFIED DEPRESSION TYPE: ICD-10-CM

## 2021-08-02 LAB
ANION GAP SERPL CALCULATED.3IONS-SCNC: 12 MMOL/L (ref 5–18)
BUN SERPL-MCNC: 14 MG/DL (ref 8–28)
CALCIUM SERPL-MCNC: 10 MG/DL (ref 8.5–10.5)
CHLORIDE BLD-SCNC: 103 MMOL/L (ref 98–107)
CHOLEST SERPL-MCNC: 177 MG/DL
CO2 SERPL-SCNC: 25 MMOL/L (ref 22–31)
CREAT SERPL-MCNC: 0.89 MG/DL (ref 0.7–1.3)
FASTING STATUS PATIENT QL REPORTED: YES
GFR SERPL CREATININE-BSD FRML MDRD: 85 ML/MIN/1.73M2
GLUCOSE BLD-MCNC: 107 MG/DL (ref 70–125)
HDLC SERPL-MCNC: 77 MG/DL
LDLC SERPL CALC-MCNC: 88 MG/DL
POTASSIUM BLD-SCNC: 4.6 MMOL/L (ref 3.5–5)
PSA SERPL-MCNC: 2.17 UG/L (ref 0–6.5)
SODIUM SERPL-SCNC: 140 MMOL/L (ref 136–145)
TRIGL SERPL-MCNC: 61 MG/DL

## 2021-08-02 PROCEDURE — 80048 BASIC METABOLIC PNL TOTAL CA: CPT | Performed by: FAMILY MEDICINE

## 2021-08-02 PROCEDURE — G0103 PSA SCREENING: HCPCS | Performed by: FAMILY MEDICINE

## 2021-08-02 PROCEDURE — 36415 COLL VENOUS BLD VENIPUNCTURE: CPT | Performed by: FAMILY MEDICINE

## 2021-08-02 PROCEDURE — 99213 OFFICE O/P EST LOW 20 MIN: CPT | Mod: 25 | Performed by: FAMILY MEDICINE

## 2021-08-02 PROCEDURE — G0438 PPPS, INITIAL VISIT: HCPCS | Performed by: FAMILY MEDICINE

## 2021-08-02 PROCEDURE — 80061 LIPID PANEL: CPT | Performed by: FAMILY MEDICINE

## 2021-08-02 RX ORDER — LISINOPRIL 20 MG/1
TABLET ORAL
COMMUNITY
Start: 2021-07-26 | End: 2021-11-16

## 2021-08-02 ASSESSMENT — ACTIVITIES OF DAILY LIVING (ADL): CURRENT_FUNCTION: NO ASSISTANCE NEEDED

## 2021-08-02 ASSESSMENT — MIFFLIN-ST. JEOR: SCORE: 1296.04

## 2021-08-02 NOTE — LETTER
August 2, 2021      Nate North  [address] [place] [place] [address]        Dear ,    We are writing to inform you of your test results.  Cholesterol is very well controlled.  Sugar is just slightly high at 107 ideally under 100, this is not diabetes, but focus on good diet and exercise to help control.  Liver and kidney tests are normal.  The PSA or prostate test is normal.         Resulted Orders   Lipid panel reflex to direct LDL Fasting   Result Value Ref Range    Cholesterol 177 <=199 mg/dL    Triglycerides 61 <=149 mg/dL    Direct Measure HDL 77 >=40 mg/dL      Comment:      HDL Cholesterol Reference Range:     0-2 years:   No reference ranges established for patients under 2 years old  at [facility] for lipid analytes.    2-8 years:  Greater than 45 mg/dL     18 years and older:   Female: Greater than or equal to 50 mg/dL   Male:   Greater than or equal to 40 mg/dL    LDL Cholesterol Calculated 88 <=129 mg/dL    Patient Fasting > 8hrs? Yes    Basic metabolic panel   Result Value Ref Range    Sodium 140 136 - 145 mmol/L    Potassium 4.6 3.5 - 5.0 mmol/L    Chloride 103 98 - 107 mmol/L    Carbon Dioxide (CO2) 25 22 - 31 mmol/L    Anion Gap 12 5 - 18 mmol/L    Urea Nitrogen 14 8 - 28 mg/dL    Creatinine 0.89 0.70 - 1.30 mg/dL    Calcium 10.0 8.5 - 10.5 mg/dL    Glucose 107 70 - 125 mg/dL    GFR Estimate 85 >60 mL/min/1.73m2      Comment:      As of July 11, 2021, eGFR is calculated by the CKD-EPI creatinine equation, without race adjustment. eGFR can be influenced by muscle mass, exercise, and diet. The reported eGFR is an estimation only and is only applicable if the renal function is stable.   PROSTATE SPEC ANTIGEN SCREEN   Result Value Ref Range    Prostate Specific Antigen Screen 2.17 0.00 - 6.50 ug/L       If you have any questions or concerns, please call the clinic at the number listed above.       Sincerely,      Juan Wu MD

## 2021-08-02 NOTE — PROGRESS NOTES
"SUBJECTIVE:   Nate North is a 70 year old male who presents for Preventive Visit.       Patient has been advised of split billing requirements and indicates understanding: Yes   Are you in the first 12 months of your Medicare coverage?  No        HPI:  Patient comes in for his annual wellness examination.  Patient has a history of underlying hypertension currently very well controlled.    Patient has a history of some depression with anxiety he is on sertraline which has been very helpful for him.    Several months the patient has noticed a skin lesion in the right lateral neck area, by inspection I do think this warrants biopsy, I am going to have him see a dermatologist for this.    Patient has a history of diarrhea, he thinks it may be related to coffee consumption, he had a recent colonoscopy I have partial results available a small polyp was noted but biopsies were taken throughout the colon and those results are pending.  He has been gradually reducing his coffee consumption.    Patient also was told that he had some reflux while he had a colonoscopy, maybe once a month he has symptoms from this, I told the patient I want him to take Prilosec daily for 2 months then discontinue and monitor for symptoms.    Patient has a history also of insomnia he is not taking anything for this, I did advise him that that he could take melatonin several times a week.    Patient had a recent urinary tract infection now resolved, he does have underlying BPH but this is stable.    Healthy Habits:     In general, how would you rate your overall health?  Good    Do you usually eat at least 4 servings of fruit and vegetables a day, include whole grains    & fiber and avoid regularly eating high fat or \"junk\" foods?  Yes    Taking medications regularly:  Yes    Medication side effects:  None    Ability to successfully perform activities of daily living:  No assistance needed    Home Safety:  No safety concerns identified    " Hearing Impairment:  Difficulty following a conversation in a noisy restaurant or crowded room    In the past 6 months, have you been bothered by leaking of urine?  No    In general, how would you rate your overall mental or emotional health?  Good      PHQ-2 Total Score: 2    Additional concerns today:  Yes    Do you feel safe in your environment? Yes    Have you ever done Advance Care Planning? (For example, a Health Directive, POLST, or a discussion with a medical provider or your loved ones about your wishes): Yes, advance care planning is on file.        Fall risk  Fallen 2 or more times in the past year?: No  Any fall with injury in the past year?: No  click delete button to remove this line now  Cognitive Screening   1) Repeat 3 items (Banana, Sunrise, Chair)  2) Clock draw: NORMAL  3) 3 item recall: Banana, Sunrise, ChairRecalls 3 objects  Results: 3 items recalled: COGNITIVE IMPAIRMENT LESS LIKELY    Mini-CogTM Copyright GENE Lynn. Licensed by the author for use in [facility]; reprinted with permission (david@[facility]). All rights reserved.      Do you have sleep apnea, excessive snoring or daytime drowsiness?: no    Reviewed and updated as needed this visit by clinical staff  Tobacco  Allergies  Meds  Problems  Med Hx  Surg Hx  Fam Hx  Soc Hx          Reviewed and updated as needed this visit by Provider  Tobacco    Problems  Med Hx  Surg Hx  Fam Hx  Soc Hx       Social History     Tobacco Use     Smoking status: Former Smoker     Packs/day: 0.50     Years: 5.00     Pack years: 2.50     Types: Cigars, Cigarettes, Cigarettes     Quit date: 1963     Years since quittin.3     Smokeless tobacco: Never Used     Tobacco comment: smoked a few years in high school   Substance Use Topics     Alcohol use: Yes     Alcohol/week: 14.0 standard drinks     Comment: Daily     If you drink alcohol do you typically have >3 drinks per day or >7 drinks per week? Yes          AUDIT - Alcohol Use  Disorders Identification Test - Reproduced from the World Health Organization Audit 2001 (Second Edition) 7/30/2021   1.  How often do you have a drink containing alcohol? 4 or more times a week   2.  How many drinks containing alcohol do you have on a typical day when you are drinking? 3 or 4   3.  How often do you have five or more drinks on one occasion? Less than monthly   4.  How often during the last year have you found that you were not able to stop drinking once you had started? Never   5.  How often during the last year have you failed to do what was normally expected of you because of drinking? Never   6.  How often during the last year have you needed a first drink in the morning to get yourself going after a heavy drinking session? Never   7.  How often during the last year have you had a feeling of guilt or remorse after drinking? Never   8.  How often during the last year have you been unable to remember what happened the night before because of your drinking? Never   9.  Have you or someone else been injured because of your drinking? No   10. Has a relative, friend, doctor or other health care worker been concerned about your drinking or suggested you cut down? No   TOTAL SCORE 6       Current providers sharing in care for this patient include:    Patient Care Team:  Juan Wu MD as PCP - General (Family Practice)  Ger Montenegro MD as Assigned Neuroscience Provider  Juan Wu MD as Assigned PCP    The following health maintenance items are reviewed in Epic and correct as of today:  Health Maintenance Due   Topic Date Due     DEPRESSION ACTION PLAN  Never done     PHQ-9  03/18/2021     FALL RISK ASSESSMENT  07/31/2021     COLORECTAL CANCER SCREENING  07/21/2021     Lab work is in process            Review of Systems  Constitutional, HEENT, cardiovascular, pulmonary, GI, , musculoskeletal, neuro, skin, endocrine and psych systems are negative, except as otherwise noted.    OBJECTIVE:  "  /74 (BP Location: Right arm, Patient Position: Sitting, Cuff Size: Adult Regular)   Pulse 70   Ht 1.626 m (5' 4\")   Wt 63.5 kg (140 lb)   SpO2 95%   BMI 24.03 kg/m   Estimated body mass index is 24.03 kg/m  as calculated from the following:    Height as of this encounter: 1.626 m (5' 4\").    Weight as of this encounter: 63.5 kg (140 lb).  Physical Exam  GENERAL: healthy, alert and no distress  EYES: Eyes grossly normal to inspection, PERRL and conjunctivae and sclerae normal  HENT: ear canals and TM's normal, nose and mouth without ulcers or lesions  NECK: no adenopathy, no asymmetry, masses, or scars and thyroid normal to palpation  RESP: lungs clear to auscultation - no rales, rhonchi or wheezes  CV: regular rate and rhythm, normal S1 S2, no S3 or S4, no murmur, click or rub, no peripheral edema and peripheral pulses strong  ABDOMEN: soft, nontender, no hepatosplenomegaly, no masses and bowel sounds normal  MS: no gross musculoskeletal defects noted, no edema  SKIN: no suspicious lesions or rashes  NEURO: Normal strength and tone, mentation intact and speech normal  PSYCH: mentation appears normal, affect normal/bright    Diagnostic Test Results:  Labs reviewed in Epic    ASSESSMENT / PLAN:   1. Screen for colon cancer  Patient just completed a colonoscopy, I do not have the full report, pathology pending    2. Wellness examination  Overall the patient has extremely good health habits.    3. Hypertension  Currently well controlled.  - Lipid panel reflex to direct LDL Fasting  - Basic metabolic panel    4. Gastroesophageal reflux disease without esophagitis  Patient has occasional intermittent symptoms.      5. Screening for prostate cancer  No family history of prostate cancer  - PROSTATE SPEC ANTIGEN SCREEN; Future  - PROSTATE SPEC ANTIGEN SCREEN    6. Depression  Patient has done well on sertraline.    7. Insomnia  Patient does have some difficulty with sleep onset and maintenance.    8. Skin " "lesion  New onset skin lesion right lateral neck area, concerning for possible cancerous changes.  - Adult Dermatology Referral; Future      PLAN:  1.  For the reflux, I instructed the patient to take Prilosec 20 mg daily for 2 weeks then discontinue but then monitor for symptoms.  2.  For insomnia issues melatonin he can take several times weekly.  3.  Referral to dermatology  4.  Laboratory studies as above.  5.  Patient otherwise should be seen yearly.        Patient has been advised of split billing requirements and indicates understanding: Yes  COUNSELING:  Reviewed preventive health counseling, as reflected in patient instructions       Regular exercise       Healthy diet/nutrition    Estimated body mass index is 24.03 kg/m  as calculated from the following:    Height as of this encounter: 1.626 m (5' 4\").    Weight as of this encounter: 63.5 kg (140 lb).        He reports that he quit smoking about 58 years ago. His smoking use included cigars, cigarettes, and cigarettes. He has a 2.50 pack-year smoking history. He has never used smokeless tobacco.      Appropriate preventive services were discussed with this patient, including applicable screening as appropriate for cardiovascular disease, diabetes, osteopenia/osteoporosis, and glaucoma.  As appropriate for age/gender, discussed screening for colorectal cancer, prostate cancer, breast cancer, and cervical cancer. Checklist reviewing preventive services available has been given to the patient.    Reviewed patients plan of care and provided an AVS. The Intermediate Care Plan ( asthma action plan, low back pain action plan, and migraine action plan) for Nate meets the Care Plan requirement. This Care Plan has been established and reviewed with the Patient.    Counseling Resources:  ATP IV Guidelines  Pooled Cohorts Equation Calculator  Breast Cancer Risk Calculator  Breast Cancer: Medication to Reduce Risk  FRAX Risk Assessment  ICSI Preventive " Guidelines  Dietary Guidelines for Americans, 2010  USDA's MyPlate  ASA Prophylaxis  Lung CA Screening    Juan Wu MD  [facility]    Identified Health Risks:

## 2021-09-04 ENCOUNTER — HEALTH MAINTENANCE LETTER (OUTPATIENT)
Age: 71
End: 2021-09-04

## 2021-09-22 DIAGNOSIS — F32.A DEPRESSION, UNSPECIFIED DEPRESSION TYPE: Primary | ICD-10-CM

## 2021-09-22 NOTE — TELEPHONE ENCOUNTER
"  Disp Refills Start End ARLEN    sertraline (ZOLOFT) 50 MG tablet 90 tablet 1 3/24/2021  No   Sig: TAKE ONE TABLET BY MOUTH EVERY DAY   Sent to pharmacy as: sertraline 50 mg tablet (ZOLOFT)   E-Prescribing Status: Receipt confirmed by pharmacy (3/24/2021 11:01 AM CDT)       Last Written Prescription Date:  3/24/21  Last Fill Quantity: 90,  # refills: 1   Last office visit provider:  8/2/21     Requested Prescriptions   Pending Prescriptions Disp Refills     sertraline (ZOLOFT) 50 MG tablet [Pharmacy Med Name: SERTRALINE HCL 50MG TABS] 90 tablet 1     Sig: TAKE ONE TABLET BY MOUTH EVERY DAY       SSRIs Protocol Passed - 9/22/2021  4:32 AM        Passed - Recent (12 mo) or future (30 days) visit within the authorizing provider's specialty     Patient has had an office visit with the authorizing provider or a provider within the authorizing providers department within the previous 12 mos or has a future within next 30 days. See \"Patient Info\" tab in inbasket, or \"Choose Columns\" in Meds & Orders section of the refill encounter.              Passed - Medication is active on med list        Passed - Patient is age 18 or older             Marshal Sykes RN 09/22/21 9:56 AM  "

## 2021-09-30 ENCOUNTER — TRANSFERRED RECORDS (OUTPATIENT)
Dept: HEALTH INFORMATION MANAGEMENT | Facility: CLINIC | Age: 71
End: 2021-09-30

## 2021-10-27 ENCOUNTER — IMMUNIZATION (OUTPATIENT)
Dept: FAMILY MEDICINE | Facility: CLINIC | Age: 71
End: 2021-10-27
Payer: COMMERCIAL

## 2021-10-27 PROCEDURE — 90662 IIV NO PRSV INCREASED AG IM: CPT

## 2021-10-27 PROCEDURE — G0008 ADMIN INFLUENZA VIRUS VAC: HCPCS

## 2021-11-15 ENCOUNTER — IMMUNIZATION (OUTPATIENT)
Dept: NURSING | Facility: CLINIC | Age: 71
End: 2021-11-15
Payer: COMMERCIAL

## 2021-11-15 DIAGNOSIS — I10 HYPERTENSION, UNSPECIFIED TYPE: Primary | ICD-10-CM

## 2021-11-15 PROCEDURE — 0064A COVID-19,PF,MODERNA (18+ YRS BOOSTER .25ML): CPT

## 2021-11-15 PROCEDURE — 91306 COVID-19,PF,MODERNA (18+ YRS BOOSTER .25ML): CPT

## 2021-11-16 RX ORDER — LISINOPRIL 20 MG/1
TABLET ORAL
Qty: 90 TABLET | Refills: 2 | Status: SHIPPED | OUTPATIENT
Start: 2021-11-16 | End: 2022-08-19

## 2021-11-16 NOTE — TELEPHONE ENCOUNTER
"Disp Refills Start End ARLEN    lisinopriL (PRINIVIL,ZESTRIL) 20 MG tablet 90 tablet 3 10/29/2020  No   Sig: TAKE ONE TABLET BY MOUTH EVERY DAY   Sent to pharmacy as: lisinopriL 20 mg tablet (PRINIVIL,ZESTRIL)   E-Prescribing Status: Receipt confirmed by pharmacy (10/29/2020 11:32 AM CDT)       lisinopriL (PRINIVIL,ZESTRIL) 20 MG tablet [[id number]]    Electronically signed by: Melba Adamson RN on 10/29/20 1132 Status: Active   Ordering user: Melba Adamson RN 10/29/20 1132 Ordering provider: Juan Wu MD   Authorized by: Juan Wu MD   Frequency:  10/29/20 - Until Discontinued Released by: Melba Adamson RN 10/29/20 1132   Diagnoses  Hypertension, unspecified type [I10]       Last office visit provider:  8/2/21     Requested Prescriptions   Pending Prescriptions Disp Refills     lisinopril (ZESTRIL) 20 MG tablet [Pharmacy Med Name: LISINOPRIL 20MG TABS] 90 tablet 3     Sig: TAKE ONE TABLET BY MOUTH EVERY DAY       ACE Inhibitors (Including Combos) Protocol Passed - 11/15/2021  4:38 AM        Passed - Blood pressure under 140/90 in past 12 months     BP Readings from Last 3 Encounters:   08/02/21 118/74   07/20/21 (!) 159/88   07/18/21 125/72                 Passed - Recent (12 mo) or future (30 days) visit within the authorizing provider's specialty     Patient has had an office visit with the authorizing provider or a provider within the authorizing providers department within the previous 12 mos or has a future within next 30 days. See \"Patient Info\" tab in inbasket, or \"Choose Columns\" in Meds & Orders section of the refill encounter.              Passed - Medication is active on med list        Passed - Patient is age 18 or older        Passed - Normal serum creatinine on file in past 12 months     Recent Labs   Lab Test 08/02/21  0946   CR 0.89       Ok to refill medication if creatinine is low          Passed - Normal serum potassium on file in past 12 months     Recent Labs   Lab Test " 08/02/21  0946   POTASSIUM 4.6                  Ashly Cano RN 11/16/21 2:17 PM

## 2022-05-09 ENCOUNTER — TRANSFERRED RECORDS (OUTPATIENT)
Dept: HEALTH INFORMATION MANAGEMENT | Facility: CLINIC | Age: 72
End: 2022-05-09
Payer: COMMERCIAL

## 2022-05-23 ENCOUNTER — IMMUNIZATION (OUTPATIENT)
Dept: NURSING | Facility: CLINIC | Age: 72
End: 2022-05-23
Payer: COMMERCIAL

## 2022-05-23 PROCEDURE — 0064A COVID-19,PF,MODERNA (18+ YRS BOOSTER .25ML): CPT

## 2022-05-23 PROCEDURE — 91306 COVID-19,PF,MODERNA (18+ YRS BOOSTER .25ML): CPT

## 2022-08-06 ASSESSMENT — ENCOUNTER SYMPTOMS
EYE PAIN: 0
HEADACHES: 0
FEVER: 0
MYALGIAS: 0
NERVOUS/ANXIOUS: 1
SHORTNESS OF BREATH: 0
FREQUENCY: 0
PARESTHESIAS: 0
DIARRHEA: 1
HEARTBURN: 0
NAUSEA: 0
HEMATURIA: 0
DIZZINESS: 0
JOINT SWELLING: 0
WEAKNESS: 0
COUGH: 0
ARTHRALGIAS: 1
ABDOMINAL PAIN: 0
HEMATOCHEZIA: 0
DYSURIA: 0
CONSTIPATION: 0
CHILLS: 0
SORE THROAT: 0
PALPITATIONS: 0

## 2022-08-06 ASSESSMENT — ACTIVITIES OF DAILY LIVING (ADL): CURRENT_FUNCTION: NO ASSISTANCE NEEDED

## 2022-08-08 ENCOUNTER — OFFICE VISIT (OUTPATIENT)
Dept: FAMILY MEDICINE | Facility: CLINIC | Age: 72
End: 2022-08-08
Payer: COMMERCIAL

## 2022-08-08 VITALS
BODY MASS INDEX: 23.49 KG/M2 | DIASTOLIC BLOOD PRESSURE: 94 MMHG | SYSTOLIC BLOOD PRESSURE: 132 MMHG | HEIGHT: 65 IN | OXYGEN SATURATION: 98 % | HEART RATE: 70 BPM | WEIGHT: 141 LBS

## 2022-08-08 DIAGNOSIS — R19.7 DIARRHEA, UNSPECIFIED TYPE: ICD-10-CM

## 2022-08-08 DIAGNOSIS — R19.7 DIARRHEA, UNSPECIFIED TYPE: Primary | ICD-10-CM

## 2022-08-08 DIAGNOSIS — Z00.00 WELLNESS EXAMINATION: Primary | ICD-10-CM

## 2022-08-08 DIAGNOSIS — F32.A DEPRESSION, UNSPECIFIED DEPRESSION TYPE: ICD-10-CM

## 2022-08-08 DIAGNOSIS — I10 HYPERTENSION, UNSPECIFIED TYPE: ICD-10-CM

## 2022-08-08 DIAGNOSIS — Z12.5 SCREENING FOR PROSTATE CANCER: ICD-10-CM

## 2022-08-08 LAB
ALBUMIN SERPL BCG-MCNC: 4.8 G/DL (ref 3.5–5.2)
ALP SERPL-CCNC: 87 U/L (ref 40–129)
ALT SERPL W P-5'-P-CCNC: 27 U/L (ref 10–50)
ANION GAP SERPL CALCULATED.3IONS-SCNC: 11 MMOL/L (ref 7–15)
AST SERPL W P-5'-P-CCNC: 31 U/L (ref 10–50)
BILIRUB SERPL-MCNC: 1.1 MG/DL
BUN SERPL-MCNC: 14 MG/DL (ref 8–23)
CALCIUM SERPL-MCNC: 9.9 MG/DL (ref 8.8–10.2)
CHLORIDE SERPL-SCNC: 99 MMOL/L (ref 98–107)
CHOLEST SERPL-MCNC: 196 MG/DL
CREAT SERPL-MCNC: 0.84 MG/DL (ref 0.67–1.17)
CRP SERPL-MCNC: <3 MG/L
DEPRECATED HCO3 PLAS-SCNC: 28 MMOL/L (ref 22–29)
ERYTHROCYTE [DISTWIDTH] IN BLOOD BY AUTOMATED COUNT: 13 % (ref 10–15)
ERYTHROCYTE [SEDIMENTATION RATE] IN BLOOD BY WESTERGREN METHOD: 10 MM/HR (ref 0–15)
GFR SERPL CREATININE-BSD FRML MDRD: >90 ML/MIN/1.73M2
GLUCOSE SERPL-MCNC: 106 MG/DL (ref 70–99)
HCT VFR BLD AUTO: 43.1 % (ref 40–53)
HDLC SERPL-MCNC: 93 MG/DL
HGB BLD-MCNC: 14.5 G/DL (ref 13.3–17.7)
LDLC SERPL CALC-MCNC: 91 MG/DL
MCH RBC QN AUTO: 36.2 PG (ref 26.5–33)
MCHC RBC AUTO-ENTMCNC: 33.6 G/DL (ref 31.5–36.5)
MCV RBC AUTO: 108 FL (ref 78–100)
NONHDLC SERPL-MCNC: 103 MG/DL
PLATELET # BLD AUTO: 222 10E3/UL (ref 150–450)
POTASSIUM SERPL-SCNC: 4.6 MMOL/L (ref 3.4–5.3)
PROT SERPL-MCNC: 8.1 G/DL (ref 6.4–8.3)
PSA SERPL-MCNC: 2.59 NG/ML (ref 0–6.5)
RBC # BLD AUTO: 4.01 10E6/UL (ref 4.4–5.9)
SODIUM SERPL-SCNC: 138 MMOL/L (ref 136–145)
TRIGL SERPL-MCNC: 59 MG/DL
TTG IGA SER-ACNC: 0.5 U/ML
TTG IGG SER-ACNC: 0.7 U/ML
WBC # BLD AUTO: 4.3 10E3/UL (ref 4–11)

## 2022-08-08 PROCEDURE — 86364 TISS TRNSGLTMNASE EA IG CLAS: CPT | Performed by: FAMILY MEDICINE

## 2022-08-08 PROCEDURE — G0103 PSA SCREENING: HCPCS | Performed by: FAMILY MEDICINE

## 2022-08-08 PROCEDURE — 80053 COMPREHEN METABOLIC PANEL: CPT | Performed by: FAMILY MEDICINE

## 2022-08-08 PROCEDURE — 80061 LIPID PANEL: CPT | Performed by: FAMILY MEDICINE

## 2022-08-08 PROCEDURE — 36415 COLL VENOUS BLD VENIPUNCTURE: CPT | Performed by: FAMILY MEDICINE

## 2022-08-08 PROCEDURE — 99214 OFFICE O/P EST MOD 30 MIN: CPT | Mod: 25 | Performed by: FAMILY MEDICINE

## 2022-08-08 PROCEDURE — 86140 C-REACTIVE PROTEIN: CPT | Performed by: FAMILY MEDICINE

## 2022-08-08 PROCEDURE — G0439 PPPS, SUBSEQ VISIT: HCPCS | Performed by: FAMILY MEDICINE

## 2022-08-08 PROCEDURE — 85027 COMPLETE CBC AUTOMATED: CPT | Performed by: FAMILY MEDICINE

## 2022-08-08 PROCEDURE — 85652 RBC SED RATE AUTOMATED: CPT | Performed by: FAMILY MEDICINE

## 2022-08-08 ASSESSMENT — ENCOUNTER SYMPTOMS
COUGH: 0
SORE THROAT: 0
HEARTBURN: 0
JOINT SWELLING: 0
DIARRHEA: 1
PARESTHESIAS: 0
SHORTNESS OF BREATH: 0
MYALGIAS: 0
FEVER: 0
NAUSEA: 0
EYE PAIN: 0
ARTHRALGIAS: 1
HEADACHES: 0
HEMATURIA: 0
CONSTIPATION: 0
PALPITATIONS: 0
HEMATOCHEZIA: 0
NERVOUS/ANXIOUS: 1
DYSURIA: 0
WEAKNESS: 0
ABDOMINAL PAIN: 0
CHILLS: 0
FREQUENCY: 0
DIZZINESS: 0

## 2022-08-08 ASSESSMENT — ACTIVITIES OF DAILY LIVING (ADL): CURRENT_FUNCTION: NO ASSISTANCE NEEDED

## 2022-08-08 NOTE — PROGRESS NOTES
"SUBJECTIVE:   Nate North is a 71 year old male who presents for Preventive Visit.  Patient has been advised of split billing requirements and indicates understanding: Yes  Are you in the first 12 months of your Medicare coverage?  No    HPI  Patient comes in for his annual Medicare wellness examination  Patient comes in stating that for about the past year or so he has had diarrhea, he had a colonoscopy about a year ago which did not find any cause for this including biopsies, of note he has 1 formed stool in the morning the rest of the day are loose, this is a daily occurrence it does not seem to matter what he eats or does not eat.  Prior to a year ago he did not have this issue despite no other change in his health status.  We did do some blood work last year I Aliyah repeat some blood work but if were not seeing any obvious cause I am anticipating likely referral to gastroenterology.    Patient has a history of underlying hypertension generally well controlled.    Patient has a history of anxiety he is on sertraline for this this was started about 2 years ago, and while this is possible that this medication is causing some of the issues I think we need to consider other potential causes.    Overall the patient does have good health habits          Healthy Habits:     In general, how would you rate your overall health?  Good    Frequency of exercise:  1 day/week    Duration of exercise:  Less than 15 minutes    Do you usually eat at least 4 servings of fruit and vegetables a day, include whole grains    & fiber and avoid regularly eating high fat or \"junk\" foods?  Yes    Taking medications regularly:  Yes    Medication side effects:  Other    Ability to successfully perform activities of daily living:  No assistance needed    Home Safety:  No safety concerns identified    Hearing Impairment:  No hearing concerns    In the past 6 months, have you been bothered by leaking of urine?  No    In general, how would " you rate your overall mental or emotional health?  Good      PHQ-2 Total Score: 2    Additional concerns today:  No    Do you feel safe in your environment? No    Have you ever done Advance Care Planning? (For example, a Health Directive, POLST, or a discussion with a medical provider or your loved ones about your wishes): Yes, advance care planning is on file.     Fall risk  Fallen 2 or more times in the past year?: No  Any fall with injury in the past year?: No  click delete button to remove this line now  Cognitive Screening   1) Repeat 3 items (Leader, Season, Table)    2) Clock draw: NORMAL  3) 3 item recall: Recalls 3 objects  Results: 3 items recalled: COGNITIVE IMPAIRMENT LESS LIKELY    Mini-CogTM Copyright GENE Lynn. Licensed by the author for use in [facility]; reprinted with permission (david@[facility]). All rights reserved.      Do you have sleep apnea, excessive snoring or daytime drowsiness?: yes    Reviewed and updated as needed this visit by clinical staff   Tobacco  Allergies  Meds                Reviewed and updated as needed this visit by Provider                   Social History     Tobacco Use     Smoking status: Former Smoker     Packs/day: 0.50     Years: 5.00     Pack years: 2.50     Types: Cigars, Cigarettes, Cigarettes     Quit date: 1963     Years since quittin.3     Smokeless tobacco: Never Used     Tobacco comment: smoked a few years in high school   Substance Use Topics     Alcohol use: Yes     Alcohol/week: 14.0 standard drinks     Comment: Daily     If you drink alcohol do you typically have >3 drinks per day or >7 drinks per week? Yes        Alcohol Use 2022   Prescreen: >3 drinks/day or >7 drinks/week? Yes   Prescreen: >3 drinks/day or >7 drinks/week? -   AUDIT SCORE  6     AUDIT - Alcohol Use Disorders Identification Test - Reproduced from the World Health Organization Audit 2001 (Second Edition) 2022   1.  How often do you have a drink containing  alcohol? 4 or more times a week   2.  How many drinks containing alcohol do you have on a typical day when you are drinking? 3 or 4   3.  How often do you have five or more drinks on one occasion? Less than monthly   4.  How often during the last year have you found that you were not able to stop drinking once you had started? Never   5.  How often during the last year have you failed to do what was normally expected of you because of drinking? Never   6.  How often during the last year have you needed a first drink in the morning to get yourself going after a heavy drinking session? Never   7.  How often during the last year have you had a feeling of guilt or remorse after drinking? Never   8.  How often during the last year have you been unable to remember what happened the night before because of your drinking? Never   9.  Have you or someone else been injured because of your drinking? No   10. Has a relative, friend, doctor or other health care worker been concerned about your drinking or suggested you cut down? No   TOTAL SCORE 6       Current providers sharing in care for this patient include:   Patient Care Team:  Juan Wu MD as PCP - General (Family Practice)  Juan Wu MD as Assigned PCP    The following health maintenance items are reviewed in Epic and correct as of today:  Health Maintenance Due   Topic Date Due     LUNG CANCER SCREENING  Never done     PHQ-9  03/18/2021     ANNUAL REVIEW OF HM ORDERS  07/20/2022     MEDICARE ANNUAL WELLNESS VISIT  08/02/2022     Lab work is in process           Review of Systems   Constitutional: Negative for chills and fever.   HENT: Positive for congestion. Negative for ear pain, hearing loss and sore throat.    Eyes: Negative for pain and visual disturbance.   Respiratory: Negative for cough and shortness of breath.    Cardiovascular: Negative for chest pain, palpitations and peripheral edema.   Gastrointestinal: Positive for diarrhea. Negative for  "abdominal pain, constipation, heartburn, hematochezia and nausea.   Genitourinary: Negative for dysuria, frequency, genital sores, hematuria, impotence, penile discharge and urgency.   Musculoskeletal: Positive for arthralgias. Negative for joint swelling and myalgias.   Skin: Negative for rash.   Neurological: Negative for dizziness, weakness, headaches and paresthesias.   Psychiatric/Behavioral: Negative for mood changes. The patient is nervous/anxious.      Constitutional, HEENT, cardiovascular, pulmonary, GI, , musculoskeletal, neuro, skin, endocrine and psych systems are negative, except as otherwise noted.    OBJECTIVE:   BP (!) 157/80 (BP Location: Right arm, Patient Position: Sitting, Cuff Size: Adult Regular)   Pulse 70   Ht 1.651 m (5' 5\")   Wt 64 kg (141 lb)   SpO2 98%   BMI 23.46 kg/m   Estimated body mass index is 23.46 kg/m  as calculated from the following:    Height as of this encounter: 1.651 m (5' 5\").    Weight as of this encounter: 64 kg (141 lb).  Physical Exam  GENERAL: healthy, alert and no distress  EYES: Eyes grossly normal to inspection, PERRL and conjunctivae and sclerae normal  HENT: ear canals and TM's normal, nose and mouth without ulcers or lesions  NECK: no adenopathy, no asymmetry, masses, or scars and thyroid normal to palpation  RESP: lungs clear to auscultation - no rales, rhonchi or wheezes  CV: regular rate and rhythm, normal S1 S2, no S3 or S4, no murmur, click or rub, no peripheral edema and peripheral pulses strong  ABDOMEN: soft, nontender, no hepatosplenomegaly, no masses and bowel sounds normal  MS: no gross musculoskeletal defects noted, no edema  SKIN: no suspicious lesions or rashes  NEURO: Normal strength and tone, mentation intact and speech normal  PSYCH: mentation appears normal, affect normal/bright    Diagnostic Test Results:  Labs reviewed in Epic    ASSESSMENT / PLAN:   (Z00.00) Wellness examination  (primary encounter diagnosis)  Comment: Overall the " "patient has good health habits and is in good health.   Plan:      (I10) Hypertension  Comment: Slight suboptimal control  Plan: Comprehensive metabolic panel, Lipid panel         reflex to direct LDL Fasting             (F32.A) Depression  Comment: Currently well controlled  Plan:      (R19.7) Diarrhea  Comment: A 1 year history of diarrhea, unclear source  Plan: CBC with platelets, CRP inflammation,         Erythrocyte sedimentation rate auto, Tissue         transglutaminase silvana IgA and IgG, Comprehensive        metabolic panel             (Z12.5) Screening for prostate cancer  Comment: No family history of  Plan: PROSTATE SPEC ANTIGEN SCREEN             PLAN:  1.  Laboratory studies as above  2.  I anticipate GI referral once labs are available.  3.  For now continue the patient on the same medications and should otherwise be seen yearly.          COUNSELING:  Reviewed preventive health counseling, as reflected in patient instructions       Regular exercise       Healthy diet/nutrition    Estimated body mass index is 23.46 kg/m  as calculated from the following:    Height as of this encounter: 1.651 m (5' 5\").    Weight as of this encounter: 64 kg (141 lb).    Weight management plan: Discussed healthy diet and exercise guidelines    He reports that he quit smoking about 59 years ago. His smoking use included cigars, cigarettes, and cigarettes. He has a 2.50 pack-year smoking history. He has never used smokeless tobacco.      Appropriate preventive services were discussed with this patient, including applicable screening as appropriate for cardiovascular disease, diabetes, osteopenia/osteoporosis, and glaucoma.  As appropriate for age/gender, discussed screening for colorectal cancer, prostate cancer, breast cancer, and cervical cancer. Checklist reviewing preventive services available has been given to the patient.    Reviewed patients plan of care and provided an AVS. The Intermediate Care Plan ( asthma action " plan, low back pain action plan, and migraine action plan) for Nate meets the Care Plan requirement. This Care Plan has been established and reviewed with the Patient.    Counseling Resources:  ATP IV Guidelines  Pooled Cohorts Equation Calculator  Breast Cancer Risk Calculator  Breast Cancer: Medication to Reduce Risk  FRAX Risk Assessment  ICSI Preventive Guidelines  Dietary Guidelines for Americans, 2010  USDA's MyPlate  ASA Prophylaxis  Lung CA Screening    Juan Wu MD  [facility]    Identified Health Risks:

## 2022-08-08 NOTE — LETTER
August 8, 2022      Adalberto North  [address] [facility] [address]        Dear ,    We are writing to inform you of your test results.  The laboratory studies are not showing any obvious cause of your diarrhea, you do not have celiac disease blood counts are essentially normal markers of inflammation are normal however we need to find out what is going on, I placed a referral for you to see a gastroenterologist you will be contacted about this.    The sugar is just slightly high at 106 this is an the range of prediabetes, but this has been stable.  Liver and kidney tests are normal.  PSA or prostate test is normal.  Cholesterol remains well controlled.      Resulted Orders   CBC with platelets   Result Value Ref Range    WBC Count 4.3 4.0 - 11.0 10e3/uL    RBC Count 4.01 (L) 4.40 - 5.90 10e6/uL    Hemoglobin 14.5 13.3 - 17.7 g/dL    Hematocrit 43.1 40.0 - 53.0 %     (H) 78 - 100 fL    MCH 36.2 (H) 26.5 - 33.0 pg    MCHC 33.6 31.5 - 36.5 g/dL    RDW 13.0 10.0 - 15.0 %    Platelet Count 222 150 - 450 10e3/uL   CRP inflammation   Result Value Ref Range    CRP Inflammation <3.00 <5.00 mg/L   Erythrocyte sedimentation rate auto   Result Value Ref Range    Erythrocyte Sedimentation Rate 10 0 - 15 mm/hr   Tissue transglutaminase silvana IgA and IgG   Result Value Ref Range    Tissue Transglutaminase Antibody IgA 0.5 <7.0 U/mL      Comment:      Negative- The tTG-IgA assay has limited utility for patients with decreased levels of IgA. Screening for celiac disease should include IgA testing to rule out selective IgA deficiency and to guide selection and interpretation of serological testing. tTG-IgG testing may be positive in celiac disease patients with IgA deficiency.    Tissue Transglutaminase Antibody IgG 0.7 <7.0 U/mL      Comment:      Negative   PROSTATE SPEC ANTIGEN SCREEN   Result Value Ref Range    Prostate Specific Antigen Screen 2.59 0.00 - 6.50 ng/mL    Narrative    This result is obtained  using the Roche Elecsys total PSA method on the zach e801 immunoassay analyzer. Results obtained with different assay methods or kits cannot be used interchangeably.   Comprehensive metabolic panel   Result Value Ref Range    Sodium 138 136 - 145 mmol/L    Potassium 4.6 3.4 - 5.3 mmol/L    Creatinine 0.84 0.67 - 1.17 mg/dL    Urea Nitrogen 14.0 8.0 - 23.0 mg/dL    Chloride 99 98 - 107 mmol/L    Carbon Dioxide (CO2) 28 22 - 29 mmol/L    Anion Gap 11 7 - 15 mmol/L    Glucose 106 (H) 70 - 99 mg/dL    Calcium 9.9 8.8 - 10.2 mg/dL    Protein Total 8.1 6.4 - 8.3 g/dL    Albumin 4.8 3.5 - 5.2 g/dL    Bilirubin Total 1.1 <=1.2 mg/dL    Alkaline Phosphatase 87 40 - 129 U/L    AST 31 10 - 50 U/L    ALT 27 10 - 50 U/L    GFR Estimate >90 >60 mL/min/1.73m2      Comment:      Effective December 21, 2021 eGFRcr in adults is calculated using the 2021 CKD-EPI creatinine equation which includes age and gender (Efra jerez al., NEJM, DOI: 10.1056/GIVRia7683977)   Lipid panel reflex to direct LDL Fasting   Result Value Ref Range    Cholesterol 196 <200 mg/dL    Triglycerides 59 <150 mg/dL    Direct Measure HDL 93 >=40 mg/dL    LDL Cholesterol Calculated 91 <=100 mg/dL    Non HDL Cholesterol 103 <130 mg/dL    Narrative    Cholesterol  Desirable:  <200 mg/dL    Triglycerides  Normal:  Less than 150 mg/dL  Borderline High:  150-199 mg/dL  High:  200-499 mg/dL  Very High:  Greater than or equal to 500 mg/dL    Direct Measure HDL  Female:  Greater than or equal to 50 mg/dL   Male:  Greater than or equal to 40 mg/dL    LDL Cholesterol  Desirable:  <100mg/dL  Above Desirable:  100-129 mg/dL   Borderline High:  130-159 mg/dL   High:  160-189 mg/dL   Very High:  >= 190 mg/dL    Non HDL Cholesterol  Desirable:  130 mg/dL  Above Desirable:  130-159 mg/dL  Borderline High:  160-189 mg/dL  High:  190-219 mg/dL  Very High:  Greater than or equal to 220 mg/dL       If you have any questions or concerns, please call the clinic at the number listed  above.       Sincerely,      Juan Wu MD

## 2022-08-17 ENCOUNTER — TELEPHONE (OUTPATIENT)
Dept: GASTROENTEROLOGY | Facility: CLINIC | Age: 72
End: 2022-08-17

## 2022-08-17 NOTE — TELEPHONE ENCOUNTER
[facility] Call Center    Phone Message    May a detailed message be left on voicemail: yes     Reason for Call: Other: Pt scheduled not within time frame needed, please review and contact Pt for earlier apt, thanks     Action Taken: Other: GI    Travel Screening: Not Applicable

## 2022-08-17 NOTE — TELEPHONE ENCOUNTER
Joanna, can you please assist patient with a sooner appointment in the next month.     Maria C Eaton RN

## 2022-08-18 DIAGNOSIS — I10 HYPERTENSION, UNSPECIFIED TYPE: ICD-10-CM

## 2022-08-18 NOTE — TELEPHONE ENCOUNTER
"Routing refill request to provider for review/approval because:  bp out of range    Last Written Prescription Date:  11/16/21  Last Fill Quantity: 90,  # refills: 2   Last office visit provider:  8/8/22     Requested Prescriptions   Pending Prescriptions Disp Refills     lisinopril (ZESTRIL) 20 MG tablet [Pharmacy Med Name: LISINOPRIL 20MG TABS] 90 tablet 2     Sig: TAKE ONE TABLET BY MOUTH EVERY DAY       ACE Inhibitors (Including Combos) Protocol Failed - 8/18/2022  4:53 AM        Failed - Blood pressure under 140/90 in past 12 months     BP Readings from Last 3 Encounters:   08/08/22 (!) 132/94   08/02/21 118/74   07/20/21 (!) 159/88                 Passed - Recent (12 mo) or future (30 days) visit within the authorizing provider's specialty     Patient has had an office visit with the authorizing provider or a provider within the authorizing providers department within the previous 12 mos or has a future within next 30 days. See \"Patient Info\" tab in inbasket, or \"Choose Columns\" in Meds & Orders section of the refill encounter.              Passed - Medication is active on med list        Passed - Patient is age 18 or older        Passed - Normal serum creatinine on file in past 12 months     Recent Labs   Lab Test 08/08/22  0902   CR 0.84       Ok to refill medication if creatinine is low          Passed - Normal serum potassium on file in past 12 months     Recent Labs   Lab Test 08/08/22  0902   POTASSIUM 4.6                  Melba Adamson RN 08/18/22 1:47 PM  "

## 2022-08-19 RX ORDER — LISINOPRIL 20 MG/1
TABLET ORAL
Qty: 90 TABLET | Refills: 2 | Status: SHIPPED | OUTPATIENT
Start: 2022-08-19 | End: 2023-05-12

## 2022-08-22 ENCOUNTER — MYC MEDICAL ADVICE (OUTPATIENT)
Dept: GASTROENTEROLOGY | Facility: CLINIC | Age: 72
End: 2022-08-22

## 2022-09-02 ENCOUNTER — VIRTUAL VISIT (OUTPATIENT)
Dept: GASTROENTEROLOGY | Facility: CLINIC | Age: 72
End: 2022-09-02
Payer: COMMERCIAL

## 2022-09-02 DIAGNOSIS — D12.6 ADENOMA OF COLON: ICD-10-CM

## 2022-09-02 DIAGNOSIS — R19.7 DIARRHEA, UNSPECIFIED TYPE: Primary | ICD-10-CM

## 2022-09-02 PROCEDURE — 99204 OFFICE O/P NEW MOD 45 MIN: CPT | Mod: 95 | Performed by: INTERNAL MEDICINE

## 2022-09-02 RX ORDER — CHOLESTYRAMINE 4 G/9G
1 POWDER, FOR SUSPENSION ORAL AT BEDTIME
Qty: 30 PACKET | Refills: 11 | Status: SHIPPED | OUTPATIENT
Start: 2022-09-02 | End: 2023-08-10

## 2022-09-02 NOTE — PROGRESS NOTES
Adalberto is a 72 year old who is being evaluated via a billable video visit.      How would you like to obtain your AVS? MyChart  If the video visit is dropped, the invitation should be to can be performed by: Text to cell phone: [phone]  Will anyone else be joining your video visit? No              Subjective   Adalberto is a 72 year old, presenting for the following health issues:  Consult      HPI     Patient notes diarrhea roughly  12 months duration.  This is associated with gastrocolic reflex and a bowel movement roughly 15 to 20 minutes after meals.  He was treated with Citrucel 2 teaspoons twice daily without significant benefit.  Stools are typically 6 to 7/day and the first 2 are typically formed, semiformed and looser.  No nocturnal stools.  He notes his sister has a similar diarrheal problem without known diagnosis no previous COVID.  By testing TTG was normal.  Prior colonoscopy with random, no microscopic colitis.  No stools in the afternoon.  Some sense of urgency this interferes with travel.  Stable weight 144 pounds.  No alarm features.    4 ounces of milk daily and has cereal this does not seem to bother him, no sorbitol candy or gum, no honey or agave, rare soda perhaps once per week.    Past medical history: Hypertension, depression    Medications reviewed on epic    Allergies reviewed on epic    Social: Previously worked for [employer], and was , then retired since 2015.  Experiencing cats at home.    Review of Systems   Nocturia perhaps 3-4 nights per week.  Gallbladder intact.  Otherwise negative noncontributory      Objective           Vitals:  No vitals were obtained today due to virtual visit.    Physical Exam   Not possible with phone visit.             Impression: Chronic diarrhea.  Microscopic colitis has been excluded.  Consider medication effect.  Sertraline is associated with diarrhea and 13 to 24% of patients.  Consider a bile salt diarrhea.  Consider pancreas insufficiency  although weight is stable.  Giardia and crypto seem less likely.  Consider functional intestinal disorder but no particular discomfort.    Plan: 1.  Fecal elastase, 2.  Questran 4 g nightly, Imodium on schedule if no benefit of Questran.  1-2 a day or 2 twice daily if needed.  Lomotil may be an option in the future.  3.  Taper with drug holiday if needed and persistent.  4.  Patient to follow-up with me in 1 to 2weeks with phone call.  As needed return        Video-Visit Details    Video Start Time: 3:30    Type of service:  Video Visit    Video End Time:4:00    Originating Location (pt. Location): Home    Distant Location (provider location):  [facility]     Platform used for Video Visit: Unable to complete video visit    [unfilled]  @[email or web address]@

## 2022-09-02 NOTE — PATIENT INSTRUCTIONS
As we had discussed    1.  Check stool test for the pancreas    2.  Prescription powder Questran 1 packet at night to treat her bile salt diarrhea  Check back in a week or 2 to let me know    3.  You can now reduce the Citrucel which is also bulky and was an excellent approach to the looser stools.    4.  If no benefit, you could consider Imodium on a regular basis 1 to 2 tablets daily I think it would be unlikely that you would need 2 tablets twice daily.  Maximum dose is 8 a day.  This is safe and not habit-forming.    Follow-up as needed

## 2022-09-18 DIAGNOSIS — F32.A DEPRESSION, UNSPECIFIED DEPRESSION TYPE: ICD-10-CM

## 2022-09-19 NOTE — TELEPHONE ENCOUNTER
"Routing refill request to provider for review/approval because:  PHQ 9 score - not on file/out of date.    Last Written Prescription Date:  9/22/21  Last Fill Quantity: 90,  # refills: 3   Last office visit provider:  8/8/22     Requested Prescriptions   Pending Prescriptions Disp Refills     sertraline (ZOLOFT) 50 MG tablet [Pharmacy Med Name: SERTRALINE HCL 50MG TABS] 90 tablet 3     Sig: TAKE ONE TABLET BY MOUTH EVERY DAY       SSRIs Protocol Failed - 9/19/2022  1:19 PM        Failed - PHQ-9 score less than 5 in past 6 months     Please review last PHQ-9 score.           Passed - Medication is active on med list        Passed - Patient is age 18 or older        Passed - Recent (6 mo) or future (30 days) visit within the authorizing provider's specialty     Patient had office visit in the last 6 months or has a visit in the next 30 days with authorizing provider or within the authorizing provider's specialty.  See \"Patient Info\" tab in inbasket, or \"Choose Columns\" in Meds & Orders section of the refill encounter.                 Marshal Sykes RN 09/19/22 1:19 PM  "

## 2022-10-07 ENCOUNTER — IMMUNIZATION (OUTPATIENT)
Dept: FAMILY MEDICINE | Facility: CLINIC | Age: 72
End: 2022-10-07
Payer: COMMERCIAL

## 2022-10-07 PROCEDURE — 91312 COVID-19,PF,PFIZER BOOSTER BIVALENT: CPT

## 2022-10-07 PROCEDURE — 90662 IIV NO PRSV INCREASED AG IM: CPT

## 2022-10-07 PROCEDURE — G0008 ADMIN INFLUENZA VIRUS VAC: HCPCS | Mod: 59

## 2022-10-07 PROCEDURE — 0124A COVID-19,PF,PFIZER BOOSTER BIVALENT: CPT

## 2022-10-10 ENCOUNTER — TELEPHONE (OUTPATIENT)
Dept: GASTROENTEROLOGY | Facility: CLINIC | Age: 72
End: 2022-10-10

## 2022-10-10 NOTE — TELEPHONE ENCOUNTER
Patient has been taking the cholestyramine 1 packet QPM  x 4 weeks and Citrucel 2 teaspoon QAM with no resolve. Patient was thinking this was the first thing to try. Patient is asking what the next steps are and if he needs to continue the cholestyramine? He has 5 packets left and will contact the pharmacy for a refill if he is to continue this.        Copied from appointment note 9/2/2022:  Impression: Chronic diarrhea.  Microscopic colitis has been excluded.  Consider medication effect.  Sertraline is associated with diarrhea and 13 to 24% of patients.  Consider a bile salt diarrhea.  Consider pancreas insufficiency although weight is stable.  Giardia and crypto seem less likely.  Consider functional intestinal disorder but no particular discomfort.     Plan: 1.  Fecal elastase, 2.  Questran 4 g nightly, Imodium on schedule if no benefit of Questran.  1-2 a day or 2 twice daily if needed.  Lomotil may be an option in the future.  3.  Taper with drug holiday if needed and persistent.  4.  Patient to follow-up with me in 1 to 2weeks with phone call.  As needed return       Please review and advise.  Joanna Hess cma.....10/10/2022 at 2:11 PM

## 2022-10-10 NOTE — TELEPHONE ENCOUNTER
Call returned.  Message below given from Dr. Carlisle.  Patient expressed verbal understanding and will follow up in a couple weeks with update.     Maria C Eaton RN

## 2022-10-10 NOTE — TELEPHONE ENCOUNTER
Nate Carlisle MD  You 1 minute ago (2:22 PM)     ML  If Questran did not help diarrhea, do not refill and can stop.  Regular use of Imodium 2 tabs twice a day may be helpful.  If no improvement, I can send another Rx medication.  The citrucel is neither here or there and can stop or stay on (patient choice).

## 2022-10-10 NOTE — TELEPHONE ENCOUNTER
Call placed, message left to return call when able.   Patient needs message below from Dr. Carlisle.     Maria C Eaton RN

## 2023-05-11 DIAGNOSIS — I10 HYPERTENSION, UNSPECIFIED TYPE: ICD-10-CM

## 2023-05-11 NOTE — TELEPHONE ENCOUNTER
"Routing refill request to provider for review/approval because:  Blood pressure out of range    Last Written Prescription Date:  8/19/22  Last Fill Quantity: 90  # refills: 2   Last office visit provider:  8/8/22    Requested Prescriptions   Pending Prescriptions Disp Refills     lisinopril (ZESTRIL) 20 MG tablet [Pharmacy Med Name: LISINOPRIL 20MG TABS] 90 tablet 2     Sig: TAKE ONE TABLET BY MOUTH EVERY DAY       ACE Inhibitors (Including Combos) Protocol Failed - 5/11/2023  5:07 AM        Failed - Blood pressure under 140/90 in past 12 months     BP Readings from Last 3 Encounters:   08/08/22 (!) 132/94   08/02/21 118/74   07/20/21 (!) 159/88                 Passed - Recent (12 mo) or future (30 days) visit within the authorizing provider's specialty     Patient has had an office visit with the authorizing provider or a provider within the authorizing providers department within the previous 12 mos or has a future within next 30 days. See \"Patient Info\" tab in inbasket, or \"Choose Columns\" in Meds & Orders section of the refill encounter.              Passed - Medication is active on med list        Passed - Patient is age 18 or older        Passed - Normal serum creatinine on file in past 12 months     Recent Labs   Lab Test 08/08/22  0902   CR 0.84       Ok to refill medication if creatinine is low          Passed - Normal serum potassium on file in past 12 months     Recent Labs   Lab Test 08/08/22  0902   POTASSIUM 4.6                  Lisa Concepcion RN 05/11/23 3:39 PM  "

## 2023-05-12 RX ORDER — LISINOPRIL 20 MG/1
TABLET ORAL
Qty: 90 TABLET | Refills: 2 | Status: SHIPPED | OUTPATIENT
Start: 2023-05-12 | End: 2024-02-26

## 2023-06-15 DIAGNOSIS — F32.A DEPRESSION, UNSPECIFIED DEPRESSION TYPE: ICD-10-CM

## 2023-06-15 NOTE — TELEPHONE ENCOUNTER
Routing refill request to provider for review/approval because:  LOV 8/08/2022    Care team please reach out to patient to schedule appointment      SSRIs Protocol Failed     PHQ-9 score less than 5 in past 6 months    Recent (6 mo) or future (30 days) visit within the authorizing provider's specialty           INEZ Coleman  [facility]

## 2023-08-03 ASSESSMENT — ENCOUNTER SYMPTOMS
ARTHRALGIAS: 1
FEVER: 0
ABDOMINAL PAIN: 0
CONSTIPATION: 0
SHORTNESS OF BREATH: 0
HEARTBURN: 0
DIZZINESS: 0
DIARRHEA: 1
HEMATOCHEZIA: 0
PALPITATIONS: 0
JOINT SWELLING: 0
PARESTHESIAS: 0
MYALGIAS: 0
COUGH: 1
HEADACHES: 0
SORE THROAT: 0
CHILLS: 0
WEAKNESS: 0
FREQUENCY: 0
DYSURIA: 0
EYE PAIN: 0
NAUSEA: 0
NERVOUS/ANXIOUS: 1
HEMATURIA: 0

## 2023-08-03 ASSESSMENT — ACTIVITIES OF DAILY LIVING (ADL): CURRENT_FUNCTION: NO ASSISTANCE NEEDED

## 2023-08-10 ENCOUNTER — OFFICE VISIT (OUTPATIENT)
Dept: FAMILY MEDICINE | Facility: CLINIC | Age: 73
End: 2023-08-10
Payer: COMMERCIAL

## 2023-08-10 VITALS
TEMPERATURE: 97.9 F | BODY MASS INDEX: 23.88 KG/M2 | HEART RATE: 80 BPM | DIASTOLIC BLOOD PRESSURE: 82 MMHG | OXYGEN SATURATION: 99 % | SYSTOLIC BLOOD PRESSURE: 149 MMHG | WEIGHT: 139.9 LBS | HEIGHT: 64 IN

## 2023-08-10 DIAGNOSIS — Z00.00 WELLNESS EXAMINATION: Primary | ICD-10-CM

## 2023-08-10 DIAGNOSIS — I10 HYPERTENSION, UNSPECIFIED TYPE: ICD-10-CM

## 2023-08-10 DIAGNOSIS — R19.7 DIARRHEA, UNSPECIFIED TYPE: ICD-10-CM

## 2023-08-10 DIAGNOSIS — F32.A DEPRESSION, UNSPECIFIED DEPRESSION TYPE: ICD-10-CM

## 2023-08-10 DIAGNOSIS — R73.09 OTHER ABNORMAL GLUCOSE: ICD-10-CM

## 2023-08-10 DIAGNOSIS — R41.3 MEMORY LOSS: ICD-10-CM

## 2023-08-10 DIAGNOSIS — Z12.5 SCREENING FOR PROSTATE CANCER: ICD-10-CM

## 2023-08-10 PROBLEM — K40.90 INGUINAL HERNIA: Status: ACTIVE | Noted: 2023-06-26

## 2023-08-10 LAB
ALBUMIN SERPL BCG-MCNC: 4.7 G/DL (ref 3.5–5.2)
ALP SERPL-CCNC: 88 U/L (ref 40–129)
ALT SERPL W P-5'-P-CCNC: 23 U/L (ref 0–70)
ANION GAP SERPL CALCULATED.3IONS-SCNC: 12 MMOL/L (ref 7–15)
AST SERPL W P-5'-P-CCNC: 28 U/L (ref 0–45)
BILIRUB SERPL-MCNC: 0.5 MG/DL
BUN SERPL-MCNC: 18.6 MG/DL (ref 8–23)
CALCIUM SERPL-MCNC: 9.8 MG/DL (ref 8.8–10.2)
CHLORIDE SERPL-SCNC: 103 MMOL/L (ref 98–107)
CHOLEST SERPL-MCNC: 194 MG/DL
CREAT SERPL-MCNC: 0.88 MG/DL (ref 0.67–1.17)
DEPRECATED HCO3 PLAS-SCNC: 24 MMOL/L (ref 22–29)
ERYTHROCYTE [DISTWIDTH] IN BLOOD BY AUTOMATED COUNT: 13.2 % (ref 10–15)
GFR SERPL CREATININE-BSD FRML MDRD: 90 ML/MIN/1.73M2
GLUCOSE SERPL-MCNC: 103 MG/DL (ref 70–99)
HBA1C MFR BLD: 5.5 % (ref 0–5.6)
HCT VFR BLD AUTO: 41 % (ref 40–53)
HDLC SERPL-MCNC: 82 MG/DL
HGB BLD-MCNC: 14.1 G/DL (ref 13.3–17.7)
LDLC SERPL CALC-MCNC: 99 MG/DL
MCH RBC QN AUTO: 36.3 PG (ref 26.5–33)
MCHC RBC AUTO-ENTMCNC: 34.4 G/DL (ref 31.5–36.5)
MCV RBC AUTO: 106 FL (ref 78–100)
NONHDLC SERPL-MCNC: 112 MG/DL
PLATELET # BLD AUTO: 200 10E3/UL (ref 150–450)
POTASSIUM SERPL-SCNC: 4.6 MMOL/L (ref 3.4–5.3)
PROT SERPL-MCNC: 7.9 G/DL (ref 6.4–8.3)
PSA SERPL DL<=0.01 NG/ML-MCNC: 2.58 NG/ML (ref 0–6.5)
RBC # BLD AUTO: 3.88 10E6/UL (ref 4.4–5.9)
SODIUM SERPL-SCNC: 139 MMOL/L (ref 136–145)
TRIGL SERPL-MCNC: 63 MG/DL
VIT B12 SERPL-MCNC: 334 PG/ML (ref 232–1245)
WBC # BLD AUTO: 4.1 10E3/UL (ref 4–11)

## 2023-08-10 PROCEDURE — G0103 PSA SCREENING: HCPCS | Performed by: FAMILY MEDICINE

## 2023-08-10 PROCEDURE — 83036 HEMOGLOBIN GLYCOSYLATED A1C: CPT | Performed by: FAMILY MEDICINE

## 2023-08-10 PROCEDURE — 99214 OFFICE O/P EST MOD 30 MIN: CPT | Mod: 25 | Performed by: FAMILY MEDICINE

## 2023-08-10 PROCEDURE — 36415 COLL VENOUS BLD VENIPUNCTURE: CPT | Performed by: FAMILY MEDICINE

## 2023-08-10 PROCEDURE — 80061 LIPID PANEL: CPT | Performed by: FAMILY MEDICINE

## 2023-08-10 PROCEDURE — 85027 COMPLETE CBC AUTOMATED: CPT | Performed by: FAMILY MEDICINE

## 2023-08-10 PROCEDURE — G0439 PPPS, SUBSEQ VISIT: HCPCS | Performed by: FAMILY MEDICINE

## 2023-08-10 PROCEDURE — 82607 VITAMIN B-12: CPT | Performed by: FAMILY MEDICINE

## 2023-08-10 PROCEDURE — 80053 COMPREHEN METABOLIC PANEL: CPT | Performed by: FAMILY MEDICINE

## 2023-08-10 ASSESSMENT — ENCOUNTER SYMPTOMS
ARTHRALGIAS: 1
PALPITATIONS: 0
JOINT SWELLING: 0
ABDOMINAL PAIN: 0
FREQUENCY: 0
DIZZINESS: 0
SORE THROAT: 0
HEMATURIA: 0
FEVER: 0
PARESTHESIAS: 0
HEARTBURN: 0
DYSURIA: 0
HEADACHES: 0
HEMATOCHEZIA: 0
EYE PAIN: 0
MYALGIAS: 0
NAUSEA: 0
CHILLS: 0
COUGH: 1
WEAKNESS: 0
NERVOUS/ANXIOUS: 1
CONSTIPATION: 0
DIARRHEA: 1
SHORTNESS OF BREATH: 0

## 2023-08-10 ASSESSMENT — PAIN SCALES - GENERAL: PAINLEVEL: NO PAIN (0)

## 2023-08-10 ASSESSMENT — ACTIVITIES OF DAILY LIVING (ADL): CURRENT_FUNCTION: NO ASSISTANCE NEEDED

## 2023-08-10 ASSESSMENT — PATIENT HEALTH QUESTIONNAIRE - PHQ9
10. IF YOU CHECKED OFF ANY PROBLEMS, HOW DIFFICULT HAVE THESE PROBLEMS MADE IT FOR YOU TO DO YOUR WORK, TAKE CARE OF THINGS AT HOME, OR GET ALONG WITH OTHER PEOPLE: NOT DIFFICULT AT ALL
SUM OF ALL RESPONSES TO PHQ QUESTIONS 1-9: 7
SUM OF ALL RESPONSES TO PHQ QUESTIONS 1-9: 7

## 2023-08-10 NOTE — PROGRESS NOTES
"SUBJECTIVE:   Adalberto is a 72 year old who presents for Preventive Visit.      8/10/2023     8:56 AM   Additional Questions   Roomed by Stanislaw Belle, Visit Facilitator       Are you in the first 12 months of your Medicare coverage?  No    HPI:  Patient comes in for his annual Medicare wellness exam  Patient has a history of hypertension of note his blood pressure is elevated, he forgot to take his blood pressure medication yesterday and did not take it today because he he thought he should not take it, that is probably the reason why it is elevated but I want the patient to check his blood pressure at home periodically    Patient has had for several years of subjective sensation of memory decline I did have him see neurology several years ago, but he thinks it is progressed.  As an example he will be driving to a place that he knows well and he will forget his way, his wife mentions on a fairly regular frequent basis that he seems to be more forgetful.    Patient does have a history of underlying depression which manifests itself as anger and irritability he is on sertraline, he does think that overall the depression is well-controlled he does not think that focus and concentration might be thrown off because of depression or anxiety issues.    Patient has been prediabetic I like to check that as well.    When I saw the patient a year ago he was having diarrhea, he had a consult with gastroenterology they tried him on a medication that he simply did not tolerate or do well with but he is taking some OTC medication which is generally helpful.    Overall the patient does have good health habits of note there is no known history of dementia in the family.              Healthy Habits:     In general, how would you rate your overall health?  Good    Frequency of exercise:  None    Do you usually eat at least 4 servings of fruit and vegetables a day, include whole grains    & fiber and avoid regularly eating high fat or \"junk\" " foods?  Yes    Taking medications regularly:  Yes    Medication side effects:  None    Ability to successfully perform activities of daily living:  No assistance needed    Home Safety:  No safety concerns identified    Hearing Impairment:  Difficulty following a conversation in a noisy restaurant or crowded room, find that men's voices are easier to understand than woman's and difficulty understanding soft or whispered speech    In the past 6 months, have you been bothered by leaking of urine?  No    In general, how would you rate your overall mental or emotional health?  Good    Additional concerns today:  Yes      Have you ever done Advance Care Planning? (For example, a Health Directive, POLST, or a discussion with a medical provider or your loved ones about your wishes): Yes, advance care planning is on file.    Fall risk  Fallen 2 or more times in the past year?: No  Any fall with injury in the past year?: No  Cognitive Screening   1) Repeat 3 items (Leader, Season, Table)      2) Clock draw:  NORMAL  3) 3 item recall:  Recalls 2 objects   Results: NORMAL clock, 1-2 items recalled: COGNITIVE IMPAIRMENT LESS LIKELY    Mini-CogTM Copyright GENE Lynn. Licensed by the author for use in [facility]; reprinted with permission (david@[facility]). All rights reserved.      Do you have sleep apnea, excessive snoring or daytime drowsiness? : yes    Reviewed and updated as needed this visit by clinical staff   Tobacco  Allergies  Meds              Reviewed and updated as needed this visit by Provider                 Social History     Tobacco Use    Smoking status: Former     Packs/day: 0.50     Years: 5.00     Pack years: 2.50     Types: Cigars, Cigarettes     Quit date: 1963     Years since quittin.3    Smokeless tobacco: Never    Tobacco comments:     smoked a few years in high school   Substance Use Topics    Alcohol use: Yes     Alcohol/week: 7.0 standard drinks of alcohol     Types: 7 Standard  drinks or equivalent per week     Comment: Daily             8/3/2023    12:58 PM   Alcohol Use   Prescreen: >3 drinks/day or >7 drinks/week? Yes   AUDIT SCORE  6         8/3/2023    12:58 PM   AUDIT - Alcohol Use Disorders Identification Test - Reproduced from the World Health Organization Audit 2001 (Second Edition)   1.  How often do you have a drink containing alcohol? 4 or more times a week   2.  How many drinks containing alcohol do you have on a typical day when you are drinking? 3 or 4   3.  How often do you have five or more drinks on one occasion? Less than monthly   4.  How often during the last year have you found that you were not able to stop drinking once you had started? Never   5.  How often during the last year have you failed to do what was normally expected of you because of drinking? Never   6.  How often during the last year have you needed a first drink in the morning to get yourself going after a heavy drinking session? Never   7.  How often during the last year have you had a feeling of guilt or remorse after drinking? Never   8.  How often during the last year have you been unable to remember what happened the night before because of your drinking? Never   9.  Have you or someone else been injured because of your drinking? No   10. Has a relative, friend, doctor or other health care worker been concerned about your drinking or suggested you cut down? No   TOTAL SCORE 6     Do you have a current opioid prescription? No  Do you use any other controlled substances or medications that are not prescribed by a provider? None          Patient Care Team:  Juan Wu MD as PCP - General (Family Practice)  Juan Wu MD as Assigned PCP    The following health maintenance items are reviewed in Epic and correct as of today:  Health Maintenance   Topic Date Due    ANNUAL REVIEW OF HM ORDERS  07/20/2022    COVID-19 Vaccine (6 - Pfizer series) 02/07/2023    MEDICARE ANNUAL WELLNESS VISIT   "08/08/2023    INFLUENZA VACCINE (1) 09/01/2023    PHQ-9  02/10/2024    FALL RISK ASSESSMENT  08/10/2024    COLORECTAL CANCER SCREENING  07/23/2026    LIPID  08/08/2027    ADVANCE CARE PLANNING  08/08/2027    DTAP/TDAP/TD IMMUNIZATION (3 - Td or Tdap) 07/31/2030    HEPATITIS C SCREENING  Completed    Pneumococcal Vaccine: 65+ Years  Completed    ZOSTER IMMUNIZATION  Completed    AORTIC ANEURYSM SCREENING (SYSTEM ASSIGNED)  Completed    IPV IMMUNIZATION  Aged Out    MENINGITIS IMMUNIZATION  Aged Out     Lab work is in process          Review of Systems   Constitutional:  Negative for chills and fever.   HENT:  Positive for hearing loss. Negative for congestion, ear pain and sore throat.    Eyes:  Negative for pain and visual disturbance.   Respiratory:  Positive for cough. Negative for shortness of breath.    Cardiovascular:  Negative for chest pain, palpitations and peripheral edema.   Gastrointestinal:  Positive for diarrhea. Negative for abdominal pain, constipation, heartburn, hematochezia and nausea.   Genitourinary:  Negative for dysuria, frequency, genital sores, hematuria, impotence, penile discharge and urgency.   Musculoskeletal:  Positive for arthralgias. Negative for joint swelling and myalgias.   Skin:  Negative for rash.   Neurological:  Negative for dizziness, weakness, headaches and paresthesias.   Psychiatric/Behavioral:  Negative for mood changes. The patient is nervous/anxious.      Constitutional, HEENT, cardiovascular, pulmonary, GI, , musculoskeletal, neuro, skin, endocrine and psych systems are negative, except as otherwise noted.    OBJECTIVE:   BP (!) 149/82   Pulse 80   Temp 97.9  F (36.6  C) (Oral)   Ht 1.62 m (5' 3.78\")   Wt 63.5 kg (139 lb 14.4 oz)   SpO2 99%   BMI 24.18 kg/m   Estimated body mass index is 24.18 kg/m  as calculated from the following:    Height as of this encounter: 1.62 m (5' 3.78\").    Weight as of this encounter: 63.5 kg (139 lb 14.4 oz).  Physical " Exam  GENERAL: healthy, alert and no distress  EYES: Eyes grossly normal to inspection, PERRL and conjunctivae and sclerae normal  HENT: ear canals and TM's normal, nose and mouth without ulcers or lesions  NECK: no adenopathy, no asymmetry, masses, or scars and thyroid normal to palpation  RESP: lungs clear to auscultation - no rales, rhonchi or wheezes  CV: regular rate and rhythm, normal S1 S2, no S3 or S4, no murmur, click or rub, no peripheral edema and peripheral pulses strong  ABDOMEN: soft, nontender, no hepatosplenomegaly, no masses and bowel sounds normal  MS: no gross musculoskeletal defects noted, no edema  SKIN: no suspicious lesions or rashes  NEURO: Normal strength and tone, mentation intact and speech normal  PSYCH: mentation appears normal, affect normal/bright    Diagnostic Test Results:  Labs reviewed in Epic    ASSESSMENT / PLAN:   (Z00.00) Wellness examination  (primary encounter diagnosis)  Comment: Patient has good health habits  Plan:      (I10) Hypertension  Comment: Suboptimal control though the patient has missed 2 doses which may be the reason  Plan: Lipid panel reflex to direct LDL Fasting             (F32.A) Depression  Comment: Appears to be well-controlled on sertraline  Plan:      (R73.09) Prediabetes  Comment: Noted previously  Plan: Hemoglobin A1c             (R41.3) Memory loss  Comment: Objective sensation of memory loss now for several years  Plan: Vitamin B12, Adult Neurology  Referral             (R19.7) Diarrhea  Comment: Controlled with OTC medication  Plan: CBC with platelets, Comprehensive metabolic         panel             (Z12.5) Screening for prostate cancer  Comment: No family history of  Plan: PROSTATE SPEC ANTIGEN SCREEN             PLAN:  Laboratory studies as above  Referral to neurology  For now maintain the patient on the same medication  The patient is going to check his blood pressure, if he is getting numbers greater than 140/90 then we may need to  reassess.  Patient otherwise should be seen yearly    Patient has been advised of split billing requirements and indicates understanding: Yes      COUNSELING:  Reviewed preventive health counseling, as reflected in patient instructions       Regular exercise       Healthy diet/nutrition    He reports that he quit smoking about 60 years ago. His smoking use included cigars and cigarettes. He has a 2.50 pack-year smoking history. He has never used smokeless tobacco.    Appropriate preventive services were discussed with this patient, including applicable screening as appropriate for cardiovascular disease, diabetes, osteopenia/osteoporosis, and glaucoma.  As appropriate for age/gender, discussed screening for colorectal cancer, prostate cancer, breast cancer, and cervical cancer. Checklist reviewing preventive services available has been given to the patient.    Reviewed patients plan of care and provided an AVS. The Intermediate Care Plan ( asthma action plan, low back pain action plan, and migraine action plan) for Nate meets the Care Plan requirement. This Care Plan has been established and reviewed with the Patient.          Juan Wu MD  [facility]    Identified Health Risks:  I have reviewed Opioid Use Disorder and Substance Use Disorder risk factors and made any needed referrals. Answers submitted by the patient for this visit:  Patient Health Questionnaire (Submitted on 8/10/2023)  If you checked off any problems, how difficult have these problems made it for you to do your work, take care of things at home, or get along with other people?: Not difficult at all  PHQ9 TOTAL SCORE: 7

## 2023-08-10 NOTE — LETTER
August 11, 2023      Adalberto North  [address] [place] [address]        Dear ,    We are writing to inform you of your test results.  Vitamin B12 level is normal.  Blood counts are essentially normal.  Sugar is barely high at 102, but another test called A1C which is a test for diabetes is normal at 5.5.  Bottom line is that your blood sugar levels are normal.  Cholesterol is well controlled.  Liver and kidney tests are normal.    The PSA or prostate test is normal.        Resulted Orders   Vitamin B12   Result Value Ref Range    Vitamin B12 334 232 - 1,245 pg/mL   CBC with platelets   Result Value Ref Range    WBC Count 4.1 4.0 - 11.0 10e3/uL    RBC Count 3.88 (L) 4.40 - 5.90 10e6/uL    Hemoglobin 14.1 13.3 - 17.7 g/dL    Hematocrit 41.0 40.0 - 53.0 %     (H) 78 - 100 fL    MCH 36.3 (H) 26.5 - 33.0 pg    MCHC 34.4 31.5 - 36.5 g/dL    RDW 13.2 10.0 - 15.0 %    Platelet Count 200 150 - 450 10e3/uL   Comprehensive metabolic panel   Result Value Ref Range    Sodium 139 136 - 145 mmol/L    Potassium 4.6 3.4 - 5.3 mmol/L    Chloride 103 98 - 107 mmol/L    Carbon Dioxide (CO2) 24 22 - 29 mmol/L    Anion Gap 12 7 - 15 mmol/L    Urea Nitrogen 18.6 8.0 - 23.0 mg/dL    Creatinine 0.88 0.67 - 1.17 mg/dL    Calcium 9.8 8.8 - 10.2 mg/dL    Glucose 103 (H) 70 - 99 mg/dL    Alkaline Phosphatase 88 40 - 129 U/L    AST 28 0 - 45 U/L      Comment:      Reference intervals for this test were updated on 6/12/2023 to more accurately reflect our healthy population. There may be differences in the flagging of prior results with similar values performed with this method. Interpretation of those prior results can be made in the context of the updated reference intervals.    ALT 23 0 - 70 U/L      Comment:      Reference intervals for this test were updated on 6/12/2023 to more accurately reflect our healthy population. There may be differences in the flagging of prior results with similar values performed with this  method. Interpretation of those prior results can be made in the context of the updated reference intervals.      Protein Total 7.9 6.4 - 8.3 g/dL    Albumin 4.7 3.5 - 5.2 g/dL    Bilirubin Total 0.5 <=1.2 mg/dL    GFR Estimate 90 >60 mL/min/1.73m2   Hemoglobin A1c   Result Value Ref Range    Hemoglobin A1C 5.5 0.0 - 5.6 %      Comment:      Normal <5.7%   Prediabetes 5.7-6.4%    Diabetes 6.5% or higher     Note: Adopted from ADA consensus guidelines.   Lipid panel reflex to direct LDL Fasting   Result Value Ref Range    Cholesterol 194 <200 mg/dL    Triglycerides 63 <150 mg/dL    Direct Measure HDL 82 >=40 mg/dL    LDL Cholesterol Calculated 99 <=100 mg/dL    Non HDL Cholesterol 112 <130 mg/dL    Narrative    Cholesterol  Desirable:  <200 mg/dL    Triglycerides  Normal:  Less than 150 mg/dL  Borderline High:  150-199 mg/dL  High:  200-499 mg/dL  Very High:  Greater than or equal to 500 mg/dL    Direct Measure HDL  Female:  Greater than or equal to 50 mg/dL   Male:  Greater than or equal to 40 mg/dL    LDL Cholesterol  Desirable:  <100mg/dL  Above Desirable:  100-129 mg/dL   Borderline High:  130-159 mg/dL   High:  160-189 mg/dL   Very High:  >= 190 mg/dL    Non HDL Cholesterol  Desirable:  130 mg/dL  Above Desirable:  130-159 mg/dL  Borderline High:  160-189 mg/dL  High:  190-219 mg/dL  Very High:  Greater than or equal to 220 mg/dL   PROSTATE SPEC ANTIGEN SCREEN   Result Value Ref Range    Prostate Specific Antigen Screen 2.58 0.00 - 6.50 ng/mL    Narrative    This result is obtained using the Roche Elecsys total PSA method on the zach [id number] immunoassay analyzer. Results obtained with different assay methods or kits cannot be used interchangeably.       If you have any questions or concerns, please call the clinic at the number listed above.       Sincerely,      Juan Wu MD

## 2023-11-10 ENCOUNTER — IMMUNIZATION (OUTPATIENT)
Dept: NURSING | Facility: CLINIC | Age: 73
End: 2023-11-10
Payer: COMMERCIAL

## 2023-11-10 PROCEDURE — 90480 ADMN SARSCOV2 VAC 1/ONLY CMP: CPT

## 2023-11-10 PROCEDURE — G0008 ADMIN INFLUENZA VIRUS VAC: HCPCS

## 2023-11-10 PROCEDURE — 90662 IIV NO PRSV INCREASED AG IM: CPT

## 2023-11-10 PROCEDURE — 91320 SARSCV2 VAC 30MCG TRS-SUC IM: CPT

## 2023-11-14 ENCOUNTER — OFFICE VISIT (OUTPATIENT)
Dept: NEUROLOGY | Facility: CLINIC | Age: 73
End: 2023-11-14
Attending: FAMILY MEDICINE
Payer: COMMERCIAL

## 2023-11-14 VITALS — HEART RATE: 79 BPM | DIASTOLIC BLOOD PRESSURE: 97 MMHG | SYSTOLIC BLOOD PRESSURE: 154 MMHG

## 2023-11-14 DIAGNOSIS — R41.3 MEMORY LOSS: Primary | ICD-10-CM

## 2023-11-14 PROCEDURE — 99205 OFFICE O/P NEW HI 60 MIN: CPT | Performed by: PSYCHIATRY & NEUROLOGY

## 2023-11-14 ASSESSMENT — MONTREAL COGNITIVE ASSESSMENT (MOCA)
WHAT LEVEL OF EDUCATION WAS ATTAINED: 1
12. MEMORY INDEX SCORE: 3
VISUOSPATIAL/EXECUTIVE SUBSCORE: 4
7. [VIGILENCE] TAP WHEN HEARING DESIGNATED LETTER: 1
10. [FLUENCY] NAME WORDS STARTING WITH DESIGNATED LETTER: 1
6. READ LIST OF DIGITS [FORWARD/BACKWARD]: 2
13. ORIENTATION SUBSCORE: 6
11. FOR EACH PAIR OF WORDS, WHAT CATEGORY DO THEY BELONG TO (OUT OF 2): 2
4. NAME EACH OF THE THREE ANIMALS SHOWN: 3
WHAT IS THE TOTAL SCORE (OUT OF 30): 27
8. SERIAL SUBTRACTION OF 7S: 3
9. REPEAT EACH SENTENCE: 1

## 2023-11-14 NOTE — NURSING NOTE
YARON COGNITIVE ASSESSMENT (MOCA)  Version 7.1 Original Version  VISUOSPATIAL/EXECUTIVE               COPY CUBE      [  1  ]                                [  1  ] DRAW CLOCK (Ten past eleven)  (3 points)    [  1  ]                    [  0  ]               [   1 ]       Contour            Numbers     Hands POINTS              4     / 5   NAMING    [  1 ]                                                                        [1    ]                                             [  1  ]  Lilloyd Martini                                Camel                 3    / 3   MEMORY Read list of words, subject must repeat them. Do 2 trials, even if 1st trial is successful. Do a recall after 5 minutes  FACE VELVET [employer] day JENNIFER RED No Points    1st          2nd         ATTENTION Read list of digits (1 digit/sec) Subject has to repeat in the forward order       [  1  ]   2  1  8  5  4                                [  1  ] 7 4 2                      2    /2   Read list of letters. The subject must tap with his hand at each letter A. No points if > 2 errors.  [   1 ] F B A C M N A A J K L B A F A K D E A A A J A M O F A A B              /1   Serial 7 subtraction starting at 100          [  1  ] 93         [ 1   ] 86          [   1 ] 79          [  1  ] 72         [ 1   ] 65   4 or 5 correct subtractions: 3 points,  2 or 3 correct: 2 points,  1correct: 1 point,   0 correct: 0 points       3     /3   LANGUAGE Repeat: I only know that Murray is the one to help today. [   1  ]                                      The cat always hid under the couch when dogs were in the room. [  1 ]            2   /2   Fluency: Name maximum number of words in one minute that begin with the letter F                                                                                                                    [  1  ] ___ (N > 11 words)          1     /1   ABSTRACTION Similarity  between e.g. banana-orange=fruit                                                                   [  1  ] train-bicycle                      [   1 ] watch-ruler              /2   DELAYED  RECALL Has to recall words  WITH NO CUE FACE  [    ] VELVET  [ 1   ] [employer]ist  [   1 ]  JENNIFER  [    ] RED  [   1 ] Points for UNCUED recall only        3    /5           OPTIONAL Category cue           Multiple choice cue          ORIENTATION  [  1  ] Date     [   1 ] Month       [ 1   ] Year      [    ] Day      [ 1   ] Place        [ 1   ] City      6 /6   TOTAL  Normal > 26/30 Add 1 point if < 12 years education    27 /30

## 2023-11-14 NOTE — PATIENT INSTRUCTIONS
Plan:  -- Updated Neuropsych testing.   -- Start a vitamin B12 supplement: 1000mcg daily.   -- Keep up the good work with staying active!  -- I will check to see if any new memory trials are going on here in town.  -- Talk to your dentist about the jaw clenching.   -- Return to Neurology after Neuropsych testing.

## 2023-11-14 NOTE — NURSING NOTE
"Nate North is a 73 year old male who presents for:  Chief Complaint   Patient presents with    Neurologic Problem     Memory loss   Concerns if theres any reliable to detect early on-set Dementia; no family history         Initial Vitals:  BP (!) 154/97   Pulse 79  Estimated body mass index is 24.18 kg/m  as calculated from the following:    Height as of 8/10/23: 1.62 m (5' 3.78\").    Weight as of 8/10/23: 63.5 kg (139 lb 14.4 oz).. There is no height or weight on file to calculate BSA. BP completed using cuff size: wrist cuff    Raj V. Doni  "

## 2023-11-14 NOTE — PROGRESS NOTES
INITIAL NEUROLOGY CONSULTATION    DATE OF VISIT: 11/14/2023  MRN: [id number]  PATIENT NAME: Nate North  YOB: 1950    REFERRING PROVIDER: Juan Wu    Chief Complaint   Patient presents with    Neurologic Problem     Memory loss   Concerns if theres any reliable to detect early on-set Dementia; no family history      SUBJECTIVE:                                                      HPI:   Nate North is a 73 year old male whom I have been asked by Dr. Wu to see in consultation for memory loss.    Per chart review, the patient was seen by Dr. Montenegro for memory concerns in this clinic in 2020.  Brain MRI was normal for age.  TSH normal, low-normal B12 at 303.  Neuropsych testing was completed at that time.    Per Dr. Aviles's 8.10.20 Assessment:  PRELIMINARY DIAGNOSIS:  No Cognitive Disorder  Adjustment Disorder with Mixed Anxiety and Depressed Mood     RECOMMENDATIONS:  1) Decreased alcohol consumption is strongly recommended. Current NIH guidelines recommend no more than 1-2 alcoholic beverages per day for older adult men. Limiting his alcohol consumption may very well elicit improvements in his cognition, mood, and sleep quality in daily life.     2) Ongoing individual psychotherapy is recommended in order to address his low-level depression and anxiety symptoms. Mr. North may specifically benefit from behavioral activation techniques, volunteering, and increased socialization will also likely help his mood. He is encouraged to continue taking sertraline, and to discuss the effectiveness of this medication with his provider to determine if additional changes to his regimen may be appropriate.     3) Consider a referral for a sleep study to evaluate his sleep-disordered breathing and rule out MCKENNA. The patient believes he already had a sleep study, although I do not see any record of this. The patient is also starting physical therapy for his shoulder pain, which may also help improve  his sleep quality.     4) Cognitive strategies will continue to be useful for Mr. North in daily life, primarily for his own reassurance. These strategies may include utilizing note pads, checklists, to-do lists, a pillbox, alarm reminders, a GPS, and maintaining a daily routine and an organized living environment. Avoiding multi-tasking during important/complex tasks is also encouraged.     5) At this time, I have no concerns about the patient's ability to independently perform complex activities of daily living.     6) Neuropsychological follow-up is not indicated at this time. However, this evaluation can now serve as a baseline should a repeat assessment be warranted in the future.    He was subsequently started on sertraline by his primary physician and reported to Dr. Montenegro that he was noticing improvement with this.  He also reported cutting down on his alcohol intake at that time.  Plan was to follow-up as needed.    According to a more recent note by Dr. Wu in 8.2023, the patient reported worsening of his memory.  He mentions some problems with getting lost in familiar areas.  His wife endorsed that Adalberto seemed more forgetful.  Mini cog was 4/5 at that visit.  He reported 7 alcoholic beverages per week.  Additional history of prediabetes and hypertension.  B12 was 334.    Adalberto says that he is relying on notes more. He has noticed decline in short term memory. Sometimes he reacts to things in a negative way. He says he does not have a good example of this. He has trouble with turning off his emotions (he mentions that she may be having some memory problems too). His wife says sometimes he tries to fill in the gaps. He gets easily distracted. He tries to focus on one task at a time. He says he was never good at multitasking.     Adalberto says that since his previous work-up the task issues have worsened, short term memory has decreased.   He endorses some word-finding difficulties. No problems with names. He  does record names with places to help him remember. He can get lost driving if he is engaged in a conversation, or if he is distracted. He likes to be on times for things.    He completed a couple of years of college after HS. He did earlier on fail some classes, did better later on. He worked at [employer], worked his way up the ladder. He started to have some trouble when he reached the 's, too many meetings, metrics. He retired at age 54.      He wonders if there are any trials.   He does not take any B12.      He does clench his jaw, asks about this.  Not necessarily when he is feeling tense, all the time.  He is not sure if he grinds his teeth.  He does often wake up with some mild headache, resolves without any treatment.    No family history of dementia.      Past Medical History:   Diagnosis Date    Anxiety     Work related. Minor attacks lasting 10-15 seconds. Substantial improvement since penitentiary. Sense of disconnectedness      Past Surgical History:   Procedure Laterality Date    ARTHROSCOPY SHOULDER ROTATOR CUFF REPAIR Right 2007    INGUINAL HERNIA REPAIR Right     VASECTOMY              lisinopril (ZESTRIL) 20 MG tablet, TAKE ONE TABLET BY MOUTH EVERY DAY  sertraline (ZOLOFT) 50 MG tablet, TAKE ONE TABLET BY MOUTH EVERY DAY    No current facility-administered medications on file prior to visit.    No Known Allergies     Problem (# of Occurrences) Relation (Name,Age of Onset)    Benign prostatic hyperplasia (1) Father    Kidney failure (1) Father    Glaucoma (3) Sister, Sister, Brother    Ovarian Cancer (1) Mother    Skin Cancer (2) Father: Not Melanoma, Brother          Social History     Tobacco Use    Smoking status: Former     Packs/day: 0.50     Years: 5.00     Additional pack years: 0.00     Total pack years: 2.50     Types: Cigars, Cigarettes     Quit date: 1963     Years since quittin.5    Smokeless tobacco: Never    Tobacco comments:     smoked a few years in high school   Vaping  Use    Vaping Use: Never used   Substance Use Topics    Alcohol use: Yes     Alcohol/week: 7.0 standard drinks of alcohol     Types: 7 Standard drinks or equivalent per week     Comment: Daily    Drug use: No       REVIEW OF SYSTEMS:                                                      10-point review of systems is negative except as mentioned above in HPI.     EXAM:                                                      Physical Exam:   Vitals: BP (!) 154/97   Pulse 79   BMI= There is no height or weight on file to calculate BMI.  GENERAL: NAD.  HEENT: NC/AT.   CV: RRR. S1, S2.   NECK: No bruits.  PULM: Non-labored breathing.   Neurologic:  MENTAL STATUS: Alert, attentive. Speech is fluent. Normal comprehension. MoCA: 27/30 (normal is 26 and above). Fair concentration, distractible, perseverative. Adequate fund of knowledge.   CRANIAL NERVES: Visual fields intact to confrontation. Pupils equally, round and reactive to light. Facial sensation and movement normal. EOM full. Hearing intact to conversation. Sternocleidomastoids and trapezius strength intact. Palate moves symmetrically. Tongue midline.  MOTOR: 5/5 in proximal and distal muscle groups of upper and lower extremities. Tone and bulk normal.   DTRs: Intact and symmetric in biceps, BR, patellae.  SENSATION: Normal light touch throughout.  COORDINATION: Normal finger nose finger.  Knee heel shin normal.  STATION AND GAIT: Casual gait is normal.  Right hand-dominant.    Relevant Data:  MRI Brain (6.25.20):  FINDINGS:  INTRACRANIAL CONTENTS: No acute or subacute infarct. No mass, acute hemorrhage, or extra-axial fluid collections. Minimal, nonspecific foci of T2/FLAIR hyperintense signal in the cerebral white matter. Normal ventricles and sulci. Normal position of the      cerebellar tonsils. No pathologic contrast enhancement. Hippocampal formations normal and symmetric.     SELLA: Partially empty sella.     OSSEOUS STRUCTURES/SOFT TISSUES: Normal marrow signal.  The major intracranial vascular flow voids are maintained.      ORBITS: No abnormality accounting for technique.      SINUSES/MASTOIDS: No paranasal sinus mucosal disease. Trace fluid and membrane thickening in the left mastoid tip.      IMPRESSION:  1.  Negative for acute intracranial process.     2.  Negative for mass, abnormal enhancement or shift.    Imaging reviewed independently by me.  Agree with radiology read.    ASSESSMENT and PLAN:                                                      Assessment:     ICD-10-CM    1. Memory loss  R41.3 Adult Neurology  Referral          Mr. North is a pleasant 73-year-old man with previous normal cognitive work-up here because of concerns about decline.  MoCA score was in normal range today.  I suggest that we do updated neuropsych testing as a starting point.  In the meantime, I asked Adalberto to start a B12 supplement.  We will decide about further evaluation and treatment options after the neuropsych testing is completed.  I encouraged Adalberto to keep active in the meantime as well.  He spent a lot of time talking about some concerns regarding interactions with his wife.  Stress regarding the relationship may be playing a role in his day-to-day memory.  We will see if this comes out as an important variable in the neuropsych testing.  I would like to see Adalberto back after the work-up is completed.  He expressed understanding and agreement with the plan.    Plan:  -- Updated Neuropsych testing.   -- Start a vitamin B12 supplement: 1000mcg daily.   -- Keep up the good work with staying active!  -- I will check to see if any new memory trials are going on here in town.  -- Talk to your dentist about the jaw clenching.   -- Return to Neurology after Neuropsych testing.       Total Time: 60 minutes were spent with the patient and in chart review/documentation (as described above in Assessment and Plan) /coordinating the care on date of service.    Radha Matute  MD [facility]  Neurology    CC: MD Jane Vazquez software used in the dictation of this note.

## 2023-11-14 NOTE — LETTER
11/14/2023         RE: Nate North  [address] [place] [address]        Dear Colleague,    Thank you for referring your patient, Nate North, to the [facility] [place]. Please see a copy of my visit note below.    INITIAL NEUROLOGY CONSULTATION    DATE OF VISIT: 11/14/2023  MRN: [id number]  PATIENT NAME: Nate North  YOB: 1950    REFERRING PROVIDER: Juan Wu    Chief Complaint   Patient presents with     Neurologic Problem     Memory loss   Concerns if theres any reliable to detect early on-set Dementia; no family history      SUBJECTIVE:                                                      HPI:   Nate North is a 73 year old male whom I have been asked by Dr. Wu to see in consultation for memory loss.    Per chart review, the patient was seen by Dr. Montenegro for memory concerns in this clinic in 2020.  Brain MRI was normal for age.  TSH normal, low-normal B12 at 303.  Neuropsych testing was completed at that time.    Per Dr. Aviles's 8.10.20 Assessment:  PRELIMINARY DIAGNOSIS:  No Cognitive Disorder  Adjustment Disorder with Mixed Anxiety and Depressed Mood     RECOMMENDATIONS:  1) Decreased alcohol consumption is strongly recommended. Current NIH guidelines recommend no more than 1-2 alcoholic beverages per day for older adult men. Limiting his alcohol consumption may very well elicit improvements in his cognition, mood, and sleep quality in daily life.     2) Ongoing individual psychotherapy is recommended in order to address his low-level depression and anxiety symptoms. Mr. North may specifically benefit from behavioral activation techniques, volunteering, and increased socialization will also likely help his mood. He is encouraged to continue taking sertraline, and to discuss the effectiveness of this medication with his provider to determine if additional changes to his regimen may be appropriate.     3) Consider a referral for a  sleep study to evaluate his sleep-disordered breathing and rule out MCKENNA. The patient believes he already had a sleep study, although I do not see any record of this. The patient is also starting physical therapy for his shoulder pain, which may also help improve his sleep quality.     4) Cognitive strategies will continue to be useful for Mr. North in daily life, primarily for his own reassurance. These strategies may include utilizing note pads, checklists, to-do lists, a pillbox, alarm reminders, a GPS, and maintaining a daily routine and an organized living environment. Avoiding multi-tasking during important/complex tasks is also encouraged.     5) At this time, I have no concerns about the patient's ability to independently perform complex activities of daily living.     6) Neuropsychological follow-up is not indicated at this time. However, this evaluation can now serve as a baseline should a repeat assessment be warranted in the future.    He was subsequently started on sertraline by his primary physician and reported to Dr. Montenegro that he was noticing improvement with this.  He also reported cutting down on his alcohol intake at that time.  Plan was to follow-up as needed.    According to a more recent note by Dr. Wu in 8.2023, the patient reported worsening of his memory.  He mentions some problems with getting lost in familiar areas.  His wife endorsed that Adalberto seemed more forgetful.  Mini cog was 4/5 at that visit.  He reported 7 alcoholic beverages per week.  Additional history of prediabetes and hypertension.  B12 was 334.    Adalberto says that he is relying on notes more. He has noticed decline in short term memory. Sometimes he reacts to things in a negative way. He says he does not have a good example of this. He has trouble with turning off his emotions (he mentions that she may be having some memory problems too). His wife says sometimes he tries to fill in the gaps. He gets easily distracted. He  tries to focus on one task at a time. He says he was never good at multitasking.     Adalberto says that since his previous work-up the task issues have worsened, short term memory has decreased.   He endorses some word-finding difficulties. No problems with names. He does record names with places to help him remember. He can get lost driving if he is engaged in a conversation, or if he is distracted. He likes to be on times for things.    He completed a couple of years of college after HS. He did earlier on fail some classes, did better later on. He worked at [employer], worked his way up the ladder. He started to have some trouble when he reached the 2000's, too many meetings, metrics. He retired at age 54.      He wonders if there are any trials.   He does not take any B12.      He does clench his jaw, asks about this.  Not necessarily when he is feeling tense, all the time.  He is not sure if he grinds his teeth.  He does often wake up with some mild headache, resolves without any treatment.    No family history of dementia.      Past Medical History:   Diagnosis Date     Anxiety     Work related. Minor attacks lasting 10-15 seconds. Substantial improvement since intermediate. Sense of disconnectedness      Past Surgical History:   Procedure Laterality Date     ARTHROSCOPY SHOULDER ROTATOR CUFF REPAIR Right 2007     INGUINAL HERNIA REPAIR Right      VASECTOMY  1997            lisinopril (ZESTRIL) 20 MG tablet, TAKE ONE TABLET BY MOUTH EVERY DAY  sertraline (ZOLOFT) 50 MG tablet, TAKE ONE TABLET BY MOUTH EVERY DAY    No current facility-administered medications on file prior to visit.    No Known Allergies     Problem (# of Occurrences) Relation (Name,Age of Onset)    Benign prostatic hyperplasia (1) Father    Kidney failure (1) Father    Glaucoma (3) Sister, Sister, Brother    Ovarian Cancer (1) Mother    Skin Cancer (2) Father: Not Melanoma, Brother          Social History     Tobacco Use     Smoking status: Former      Packs/day: 0.50     Years: 5.00     Additional pack years: 0.00     Total pack years: 2.50     Types: Cigars, Cigarettes     Quit date: 1963     Years since quittin.5     Smokeless tobacco: Never     Tobacco comments:     smoked a few years in high school   Vaping Use     Vaping Use: Never used   Substance Use Topics     Alcohol use: Yes     Alcohol/week: 7.0 standard drinks of alcohol     Types: 7 Standard drinks or equivalent per week     Comment: Daily     Drug use: No       REVIEW OF SYSTEMS:                                                      10-point review of systems is negative except as mentioned above in HPI.     EXAM:                                                      Physical Exam:   Vitals: BP (!) 154/97   Pulse 79   BMI= There is no height or weight on file to calculate BMI.  GENERAL: NAD.  HEENT: NC/AT.   CV: RRR. S1, S2.   NECK: No bruits.  PULM: Non-labored breathing.   Neurologic:  MENTAL STATUS: Alert, attentive. Speech is fluent. Normal comprehension. MoCA: 27/30 (normal is 26 and above). Fair concentration, distractible, perseverative. Adequate fund of knowledge.   CRANIAL NERVES: Visual fields intact to confrontation. Pupils equally, round and reactive to light. Facial sensation and movement normal. EOM full. Hearing intact to conversation. Sternocleidomastoids and trapezius strength intact. Palate moves symmetrically. Tongue midline.  MOTOR: 5/5 in proximal and distal muscle groups of upper and lower extremities. Tone and bulk normal.   DTRs: Intact and symmetric in biceps, BR, patellae.  SENSATION: Normal light touch throughout.  COORDINATION: Normal finger nose finger.  Knee heel shin normal.  STATION AND GAIT: Casual gait is normal.  Right hand-dominant.    Relevant Data:  MRI Brain (20):  FINDINGS:  INTRACRANIAL CONTENTS: No acute or subacute infarct. No mass, acute hemorrhage, or extra-axial fluid collections. Minimal, nonspecific foci of T2/FLAIR hyperintense signal in  the cerebral white matter. Normal ventricles and sulci. Normal position of the      cerebellar tonsils. No pathologic contrast enhancement. Hippocampal formations normal and symmetric.     SELLA: Partially empty sella.     OSSEOUS STRUCTURES/SOFT TISSUES: Normal marrow signal. The major intracranial vascular flow voids are maintained.      ORBITS: No abnormality accounting for technique.      SINUSES/MASTOIDS: No paranasal sinus mucosal disease. Trace fluid and membrane thickening in the left mastoid tip.      IMPRESSION:  1.  Negative for acute intracranial process.     2.  Negative for mass, abnormal enhancement or shift.    Imaging reviewed independently by me.  Agree with radiology read.    ASSESSMENT and PLAN:                                                      Assessment:     ICD-10-CM    1. Memory loss  R41.3 Adult Neurology  Referral          Mr. North is a pleasant 73-year-old man with previous normal cognitive work-up here because of concerns about decline.  MoCA score was in normal range today.  I suggest that we do updated neuropsych testing as a starting point.  In the meantime, I asked Adalberto to start a B12 supplement.  We will decide about further evaluation and treatment options after the neuropsych testing is completed.  I encouraged Adalberto to keep active in the meantime as well.  He spent a lot of time talking about some concerns regarding interactions with his wife.  Stress regarding the relationship may be playing a role in his day-to-day memory.  We will see if this comes out as an important variable in the neuropsych testing.  I would like to see Adalberto back after the work-up is completed.  He expressed understanding and agreement with the plan.    Plan:  -- Updated Neuropsych testing.   -- Start a vitamin B12 supplement: 1000mcg daily.   -- Keep up the good work with staying active!  -- I will check to see if any new memory trials are going on here in town.  -- Talk to your dentist about  the jaw clenching.   -- Return to Neurology after Neuropsych testing.       Total Time: 60 minutes were spent with the patient and in chart review/documentation (as described above in Assessment and Plan) /coordinating the care on date of service.    Radha Gaytan MD  Neurology    CC: Juan Wu MD    Dragon software used in the dictation of this note.      Again, thank you for allowing me to participate in the care of your patient.        Sincerely,        Radha Gaytan MD

## 2023-11-15 ENCOUNTER — TELEPHONE (OUTPATIENT)
Dept: NEUROLOGY | Facility: CLINIC | Age: 73
End: 2023-11-15
Payer: COMMERCIAL

## 2023-11-15 NOTE — TELEPHONE ENCOUNTER
[facility] Call Center    Phone Message    May a detailed message be left on voicemail: yes     Reason for Call: Other: Pt is wanting to let the clinical staff know that he has scheduled his neuropsychology appt for 10/10/24.      Please call pt with additional questions at # [phone].    Action Taken: Message routed to:  [facility]    Travel Screening: Not Applicable

## 2023-11-29 ENCOUNTER — PATIENT OUTREACH (OUTPATIENT)
Dept: GASTROENTEROLOGY | Facility: CLINIC | Age: 73
End: 2023-11-29
Payer: COMMERCIAL

## 2023-12-17 DIAGNOSIS — F32.A DEPRESSION, UNSPECIFIED DEPRESSION TYPE: ICD-10-CM

## 2024-02-25 DIAGNOSIS — I10 HYPERTENSION, UNSPECIFIED TYPE: ICD-10-CM

## 2024-02-26 RX ORDER — LISINOPRIL 20 MG/1
TABLET ORAL
Qty: 90 TABLET | Refills: 2 | Status: SHIPPED | OUTPATIENT
Start: 2024-02-26

## 2024-05-13 ENCOUNTER — OFFICE VISIT (OUTPATIENT)
Dept: FAMILY MEDICINE | Facility: CLINIC | Age: 74
End: 2024-05-13
Payer: COMMERCIAL

## 2024-05-13 VITALS
OXYGEN SATURATION: 97 % | DIASTOLIC BLOOD PRESSURE: 68 MMHG | WEIGHT: 144.56 LBS | HEART RATE: 88 BPM | BODY MASS INDEX: 24.99 KG/M2 | SYSTOLIC BLOOD PRESSURE: 130 MMHG

## 2024-05-13 DIAGNOSIS — M21.70 LEG LENGTH DISCREPANCY: ICD-10-CM

## 2024-05-13 DIAGNOSIS — K40.91 UNILATERAL RECURRENT INGUINAL HERNIA WITHOUT OBSTRUCTION OR GANGRENE: ICD-10-CM

## 2024-05-13 DIAGNOSIS — R41.3 MEMORY LOSS: ICD-10-CM

## 2024-05-13 DIAGNOSIS — M79.645 BILATERAL THUMB PAIN: Primary | ICD-10-CM

## 2024-05-13 DIAGNOSIS — L98.9 SKIN LESION: ICD-10-CM

## 2024-05-13 DIAGNOSIS — R53.81 PHYSICAL DECONDITIONING: ICD-10-CM

## 2024-05-13 DIAGNOSIS — M79.644 BILATERAL THUMB PAIN: Primary | ICD-10-CM

## 2024-05-13 PROCEDURE — 99213 OFFICE O/P EST LOW 20 MIN: CPT | Performed by: FAMILY MEDICINE

## 2024-05-13 PROCEDURE — G2211 COMPLEX E/M VISIT ADD ON: HCPCS | Performed by: FAMILY MEDICINE

## 2024-05-13 RX ORDER — LOPERAMIDE HCL 2 MG
2 CAPSULE ORAL 4 TIMES DAILY PRN
COMMUNITY

## 2024-05-13 ASSESSMENT — PATIENT HEALTH QUESTIONNAIRE - PHQ9
SUM OF ALL RESPONSES TO PHQ QUESTIONS 1-9: 2
SUM OF ALL RESPONSES TO PHQ QUESTIONS 1-9: 2
10. IF YOU CHECKED OFF ANY PROBLEMS, HOW DIFFICULT HAVE THESE PROBLEMS MADE IT FOR YOU TO DO YOUR WORK, TAKE CARE OF THINGS AT HOME, OR GET ALONG WITH OTHER PEOPLE: NOT DIFFICULT AT ALL

## 2024-05-13 NOTE — PATIENT INSTRUCTIONS
For thumb pain, you can take tylenol daily.   Lesion on left face, seborrheic keratosis, benign.   Lesion forehead, needs to be seen by dermatology

## 2024-05-13 NOTE — PROGRESS NOTES
Assessment & Plan     (M79.644,  M79.645) Bilateral thumb pain  (primary encounter diagnosis)  Comment: Likely underlying osteoarthritis  Plan:      (R41.3) Memory loss  Comment: Subjective sensation of, patient does not have a diagnosis of dementia  Plan: PRIMARY CARE FOLLOW-UP SCHEDULING             (L98.9) Skin lesion  Comment: Patient with likely benign skin lesion left temple area but he has a lesion of the forehead that is concerning for possible squamous cell  Plan: Adult Dermatology  Referral             (K40.91) Unilateral recurrent inguinal hernia without obstruction or gangrene  Comment: Patient had surgery for right inguinal hernia in the past I do not see any evidence of recurrence  Plan:      (R53.81) Physical deconditioning  Comment: Patient subjectively reports that he feels that he is deconditioned  Plan: Physical Therapy  Referral             (M21.70) Leg length discrepancy  Comment: Chronic longstanding orthotic  Plan:      PLAN:  1.  Referral to dermatology  2.  Referral to physical therapy for the patient to assist with physical deconditioning exercises and other activities he can do.  3.  For the thumb pain, recommend Tylenol regularly, orthopedic referral as a potential future option.  4.  Patient otherwise is due for a Medicare wellness exam in 6 months  5.  The longitudinal plan of care for the diagnosis(es)/condition(s) as documented were addressed during this visit. Due to the added complexity in care, I will continue to support Adalberto in the subsequent management and with ongoing continuity of care.                    Inder Glover is a 73 year old, presenting for the following health issues:  No chief complaint on file.      5/13/2024     2:46 PM   Additional Questions   Roomed by Brenda   Accompanied by self     History of Present Illness       Reason for visit:  Wrists/thumbs; mole; hernia; exercise; forehead; shoe inserts  Symptom onset:  More than a  month  Symptoms include:  Pain and concern  Symptom intensity:  Moderate  Symptom progression:  Worsening  Had these symptoms before:  Yes  Has tried/received treatment for these symptoms:  Yes  Previous treatment was successful:  No  What makes it worse:  No  What makes it better:  No    He eats 2-3 servings of fruits and vegetables daily.He consumes 0 sweetened beverage(s) daily.He exercises with enough effort to increase his heart rate 10 to 19 minutes per day.  He exercises with enough effort to increase his heart rate 3 or less days per week.   He is taking medications regularly.     Patient comes in with a number of concerns.  Patient has mentioned some memory decline over time he has been seen and evaluated at [facility], he does not have a diagnosis of dementia at this time but he is aware that his memory is not as good as it used to be.    Patient for some time has had bilateral thumb pain, sometimes it extends up the path of the ligament I assume that there is some underlying osteoarthritis of the thumbs.  I told the patient we can have him see one of her orthopedic specialist but in the meantime I want him to use Tylenol regularly for this.    Years ago the patient had a right inguinal hernia repair, on examination I do not see any evidence of recurrent    Patient has a benign skin lesion in the left temple area that I believe to be a seborrheic keratosis but has had a lesion of the forehead that is been frozen it persists I would like him to be seen and evaluated by dermatology.    Patient readily admits he is not very physically active he actually is requesting to see a physical therapist so he can learn some exercises for strength training that he can do to maintain some conditioning.    Patient decades ago was diagnosed with a leg length discrepancy he has orthotics that he is worn for decades, they only fit in his tennis shoes, as long as he wears them he can really avoid pain if he does not wear them he  gets some right hip pain.                      Objective    There were no vitals taken for this visit.  There is no height or weight on file to calculate BMI.  Physical Exam    examination I do not see any evidence of recurrent inguinal hernia  Skin examination.  Patient has a fleshy type lesion mid forehead area, almost looks like a wart but it has been persistent for some time there is a nickel sized area of raised brown lesion in the left temple area consistent with seborrheic keratosis              Signed Electronically by: Juan Wu MD

## 2024-05-17 ENCOUNTER — THERAPY VISIT (OUTPATIENT)
Dept: PHYSICAL THERAPY | Facility: REHABILITATION | Age: 74
End: 2024-05-17
Attending: FAMILY MEDICINE
Payer: COMMERCIAL

## 2024-05-17 DIAGNOSIS — R53.81 PHYSICAL DECONDITIONING: ICD-10-CM

## 2024-05-17 PROCEDURE — 97161 PT EVAL LOW COMPLEX 20 MIN: CPT | Mod: GP

## 2024-05-17 PROCEDURE — 97110 THERAPEUTIC EXERCISES: CPT | Mod: GP

## 2024-05-17 NOTE — PROGRESS NOTES
PHYSICAL THERAPY EVALUATION  Type of Visit: Evaluation    See electronic medical record for Abuse and Falls Screening details.    Inder Glover was recently referred to physical therapy for physical deconditioning. He reports that the most activity he currently he gets is an hour of lawn mowing in summer, and snow-blowing for about 1.5-2 hours as needed during the winter. His primary goal is to establish an exercise routine to improve his overall health. He also reports bilateral thumb pain with some radiation up into the wrist. He is working on that pain with his doctor.  Presenting condition or subjective complaint: lack of exercise  Date of onset: 05/13/24 (Date recorded is date of referral.)    Relevant medical history:     Dates & types of surgery: rotator cuff and hernia, approx. 2008    Prior diagnostic imaging/testing results:       Prior therapy history for the same diagnosis, illness or injury: No      Prior Level of Function  Transfers: Independent  Ambulation: Independent  ADL: Independent  IADL:  independent    Living Environment  Social support: With a significant other or spouse   Type of home: House   Stairs to enter the home: Yes 6 Is there a railing: Yes   Ramp: No   Stairs inside the home: Yes 13 Is there a railing: Yes   Help at home: None  Equipment owned:       Employment: Not Applicable    Hobbies/Interests:      Patient goals for therapy: begin an exercise program    Pain assessment:  see subjective report     Objective      Cognitive Status Examination  Orientation: Oriented to person, place and time   Level of Consciousness: Alert  Follows Commands and Answers Questions: 100% of the time, Follows multi step instructions  Personal Safety and Judgement: Intact  Memory: Intact    OBSERVATION:   INTEGUMENTARY:   POSTURE:   PALPATION:   RANGE OF MOTION:   STRENGTH:   Pain: - none + mild ++ moderate +++ severe  Strength Scale: 0-5/5 Left Right   Ankle Dorsiflexion 5 5   Ankle  Plantarflexion 5 5   Ankle Inversion     Ankle Eversion     Great Toe Flexion     Great Toe Extension 5 5   Anterior Tibialis     Posterior Tibialis     Peroneals     Extensor Digitorum     Gastroc/Soleus       Pain: - none + mild ++ moderate +++ severe  Strength Scale: 0-5/5 Left Right   Hip Flexion 5 5   Hip Extension     Hip Abduction 5 5   Hip Adduction 5 5   Hip Internal Rotation     Hip External Rotation     Knee Flexion 5 5   Knee Extension 5 5     Pain: - none + mild ++ moderate +++ severe  Strength Scale: 0-5/5 Left Right   Shoulder Flexion 5 5   Shoulder Extension     Shoulder Abduction 5 4 previous rotator cuff surgery   Shoulder Adduction     Shoulder Internal Rotation 5 5   Shoulder External Rotation 5 5   Shoulder Horizontal Abduction     Shoulder Horizontal Adduction     Elbow Flexion 5 5   Elbow Extension 5 5   Mid Trap     Lower Trap     Rhomboid     Serratus Anterior       Pain: - none + mild ++ moderate +++ severe  Strength Scale: 0-5/5 Left Right   Elbow Flexion     Elbow Extension     Wrist Flexion     Wrist Extension 5 5   Supination     Pronation     Ulnar Deviation     Radial Deviation      Strength (lbs)     Lateral Pinch (lbs)     3 Point Pinch (lbs)          Hand Dominance:     BED MOBILITY:     TRANSFERS:     WHEELCHAIR MOBILITY:     GAIT:   Level of Newcomb:   Assistive Device(s):   Gait Deviations:   Gait Distance:   Stairs:     BALANCE:     SPECIAL TESTS  Functional Gait Assessment (FGA)      10 Meter Walk Test (Comfortable)     10 Meter Walk Test (Fast)     6 Minute Walk Test (6MWT)   495.15 meters (not overly fatigued or short-of-breath)     Did not require standing rest break   Finley Balance Scale (BBS)     5 Times Sit-to-Stand (5TSTS)       Dynamic Gait Index (DGI)     Timed Up and Go (TUG) - sec    Single Leg Stance Right (sec)    Single Leg Stance Left (sec)    Modified CTSIB Conditions (sec) Cond 1:   Cond 2:   Cond 4:   Cond 5 :    Romberg  (sec)    Sharpened Romberg  (sec)    30 Second Sit to Stand (reps/height) 15 reps/standard chair   Mini-BESTest              SENSATION:     REFLEXES:   COORDINATION:   MUSCLE TONE:         Assessment & Plan   CLINICAL IMPRESSIONS  Medical Diagnosis: Physical deconditioning    Treatment Diagnosis: Impaired strength and functional capacity   Impression/Assessment: Patient is a 73 year old male with general deconditioning complaints.  The following significant findings have been identified: Pain, Decreased strength, Impaired muscle performance, and Decreased activity tolerance. These impairments interfere with their ability to perform recreational activities and household chores as compared to previous level of function.     Clinical Decision Making (Complexity):  Clinical Presentation: Stable/Uncomplicated  Clinical Presentation Rationale: based on medical and personal factors listed in PT evaluation  Clinical Decision Making (Complexity): Low complexity    PLAN OF CARE  Treatment Interventions:  Interventions: Neuromuscular Re-education, Therapeutic Activity, Therapeutic Exercise, Self-Care/Home Management    Long Term Goals     PT Goal 1  Goal Identifier: HEP  Goal Description: Adalberto will demonstrate mastery of ([id number]-nc need for cueing) and compliance with (completion on at least 5/7 days/week) his home exercise program to facilitate increased rate of improvement.  Goal Progress: In progress  Target Date: 06/14/24  PT Goal 2  Goal Identifier: 6-minute Walk Test  Goal Description: Nate demonstrate demographic normative values for the 6-minute walk test as evidenced by an improved 6-minute walk test distance of 527 m.  Goal Progress: 495.15 m  Target Date: 07/12/24      Frequency of Treatment: 1 time every 3 weeks  Duration of Treatment: 8 weeks    Recommended Referrals to Other Professionals:  possible referral to occupational therapy pending progression of thumb/wrist pain   Education Assessment:   Learner/Method:  Patient;Listening;Demonstration;Pictures/Video;No Barriers to Learning    Risks and benefits of evaluation/treatment have been explained.   Patient/Family/caregiver agrees with Plan of Care.     Evaluation Time:     PT Eval, Low Complexity Minutes (46166): 31       Signing Clinician: Sixto Marshall PT      [facility]                                                                                   OUTPATIENT PHYSICAL THERAPY      PLAN OF TREATMENT FOR OUTPATIENT REHABILITATION   Patient's Last Name, First Name, Nate Howell YOB: 1950   Provider's Name   [facility]   Medical Record No.  [id number]     Onset Date: 05/13/24 (Date recorded is date of referral.)  Start of Care Date: 05/17/24     Medical Diagnosis:  Physical deconditioning      PT Treatment Diagnosis:  Impaired strength and functional capacity Plan of Treatment  Frequency/Duration: 1 time every 3 weeks/ 8 weeks    Certification date from 05/17/24 to 07/12/24         See note for plan of treatment details and functional goals     Sixto Marshall, PT                         I CERTIFY THE NEED FOR THESE SERVICES FURNISHED UNDER        THIS PLAN OF TREATMENT AND WHILE UNDER MY CARE     (Physician attestation of this document indicates review and certification of the therapy plan).              Referring Provider:  Juan Wu    Initial Assessment  See Epic Evaluation- Start of Care Date: 05/17/24

## 2024-05-26 DIAGNOSIS — F32.A DEPRESSION, UNSPECIFIED DEPRESSION TYPE: ICD-10-CM

## 2024-06-13 ENCOUNTER — THERAPY VISIT (OUTPATIENT)
Dept: PHYSICAL THERAPY | Facility: REHABILITATION | Age: 74
End: 2024-06-13
Payer: COMMERCIAL

## 2024-06-13 DIAGNOSIS — R53.81 PHYSICAL DECONDITIONING: Primary | ICD-10-CM

## 2024-06-13 PROCEDURE — 97110 THERAPEUTIC EXERCISES: CPT | Mod: GP

## 2024-08-14 NOTE — PROGRESS NOTES
DISCHARGE  Reason for Discharge: Patient chooses to discontinue therapy.  Patient has failed to schedule further appointments.    Equipment Issued: N/A    Discharge Plan: Patient to continue home program.    Referring Provider:  Juan Wu           06/13/24 0500   Appointment Info   Signing clinician's name / credentials Sitxo Marshall, PT   Visits Used 2   Medical Diagnosis Physical deconditioning   PT Tx Diagnosis Impaired strength and functional capacity   Progress Note/Certification   Start of Care Date 05/17/24   Onset of illness/injury or Date of Surgery 05/13/24  (Date recorded is date of referral.)   Therapy Frequency 1 time every 3 weeks   Predicted Duration 8 weeks   Certification date from 05/17/24   Certification date to 07/12/24   Progress Note Due Date 06/14/24   PT Goal 1   Goal Identifier HEP   Goal Description Adalberto will demonstrate mastery of ([id number]-gb need for cueing) and compliance with (completion on at least 5/7 days/week) his home exercise program to facilitate increased rate of improvement.   Goal Progress 1 time over the last month   Target Date 06/14/24   PT Goal 2   Goal Identifier 6-minute Walk Test   Goal Description Nate will demonstrate demographic normative values for the 6-minute walk test as evidenced by an improved 6-minute walk test distance of 527 m.   Goal Progress 495.15 m   Target Date 07/12/24   Subjective Report   Subjective Report Adalberto reports that he only did the exercises one time since last visit as he has been very busy with household and outdoor chores, though he does note that he has been quite active.   Objective Measures   Objective Measures Objective Measure 1;Objective Measure 2;Objective Measure 3   Objective Measure 1   Objective Measure 6-minute Walk Test   Details Initial evaluation: 495.15 m   Objective Measure 2   Objective Measure 30-second Sit-to-stand   Details Initial evaluation: 15 reps/standard chair   Therapeutic Procedure/Exercise  "  Therapeutic Procedures: strength, endurance, ROM, flexibility minutes (26989) 40   Ther Proc 1 Treadmill + subjective report   Ther Proc 1 - Details 5 minutes. See subjecitve report.   Ther Proc 2 Walking program   Ther Proc 2 - Details Verbal review   Ther Proc 3 Upright bike   Ther Proc 3 - Details 5 minutes (level 5)   Skilled Intervention Exercises to increase overall strength and conditioning   Patient Response/Progress Adalberto tolerated all exercises well with cueing for proper form.   Ther Proc 4 Bridgiing   Ther Proc 4 - Details 1 x 15 with 5 second holds   Ther Proc 5 Clam shell   Ther Proc 5 - Details 2 x 10 with 3 second holds bilateral (cueing to avoid posterior trunk rotation)   Ther Proc 6 Squat   Ther Proc 6 - Details 1 x 12 (cueing to avoid anterior knee translation or knee valgus/varus, and to keep \"chest up\")   Ther Proc 7 Resisted bilateral shoulder exercises   Ther Proc 7 - Details Flexion and abduction: 1 x 10 each (green band)   Ther Proc 8 Supine reistance band \"push-up\"   Ther Proc 8 - Details 1 x 10 (green band)   Education   Learner/Method Patient;Listening;Demonstration;Pictures/Video;No Barriers to Learning   Plan   Home program See PTRx. Initiate walking program.   Updates to plan of care Discharge pending 30-day trial of HEP.   Comments   Comments Impression/Assessment: Adalberto returns to physical therapy after a month reporting that he has been very active with household and outdoor chores, but he only completed his home exercises one time. Given lack of compliance, the session today focused on review of HEP with cueing for proper form/performance rather than retesting of objective measures. He reports feeling ready for discharge with the HEP to this point, however, he does voice some hesitation at discharging completely, so therapist will leave his chart open for 30 days to trial HEP. If he has not contacted therapist in that time, he will be formally discharged.   Total Session Time "   Timed Code Treatment Minutes 40   Total Treatment Time (sum of timed and untimed services) 40

## 2024-08-19 ENCOUNTER — PATIENT OUTREACH (OUTPATIENT)
Dept: CARE COORDINATION | Facility: CLINIC | Age: 74
End: 2024-08-19
Payer: COMMERCIAL

## 2024-09-04 ENCOUNTER — ANCILLARY PROCEDURE (OUTPATIENT)
Dept: GENERAL RADIOLOGY | Facility: CLINIC | Age: 74
End: 2024-09-04
Attending: NURSE PRACTITIONER
Payer: COMMERCIAL

## 2024-09-04 ENCOUNTER — OFFICE VISIT (OUTPATIENT)
Dept: FAMILY MEDICINE | Facility: CLINIC | Age: 74
End: 2024-09-04
Payer: COMMERCIAL

## 2024-09-04 VITALS
HEART RATE: 76 BPM | BODY MASS INDEX: 23.71 KG/M2 | SYSTOLIC BLOOD PRESSURE: 136 MMHG | RESPIRATION RATE: 14 BRPM | DIASTOLIC BLOOD PRESSURE: 82 MMHG | TEMPERATURE: 98.1 F | WEIGHT: 137.2 LBS | OXYGEN SATURATION: 97 %

## 2024-09-04 DIAGNOSIS — M54.50 ACUTE RIGHT-SIDED LOW BACK PAIN WITHOUT SCIATICA: Primary | ICD-10-CM

## 2024-09-04 DIAGNOSIS — Q72.811 SHORTENING, LEG, CONGENITAL, RIGHT: ICD-10-CM

## 2024-09-04 DIAGNOSIS — M54.50 ACUTE RIGHT-SIDED LOW BACK PAIN WITHOUT SCIATICA: ICD-10-CM

## 2024-09-04 DIAGNOSIS — K40.90 RIGHT INGUINAL HERNIA: ICD-10-CM

## 2024-09-04 PROCEDURE — 99214 OFFICE O/P EST MOD 30 MIN: CPT | Performed by: NURSE PRACTITIONER

## 2024-09-04 PROCEDURE — 72100 X-RAY EXAM L-S SPINE 2/3 VWS: CPT | Mod: TC | Performed by: RADIOLOGY

## 2024-09-04 NOTE — PROGRESS NOTES
Assessment & Plan     Acute right-sided low back pain without sciatica  Will obtain xray to assess back pain.  As patient was leaving he did mention his right leg was shorter than left and wondering if this could be contributing to pain-I do think this is possible. Will place orthotics referral as requested.    Discussed PT-patient not amenable to trying at this time. Will place spine referral due to length of pain-but he may still benefit from physical therapy.    - XR Lumbar Spine 2/3 Views; Future  - Spine  Referral; Future  - diclofenac (VOLTAREN) 1 % topical gel; Apply 4 g topically 4 times daily.    Right inguinal hernia  Patient requesting further evaluation of hernia. He states it is still currently bothersome. On last check by his primary in May a hernia was not appreciated on exam. I think it is reasonable to be evaluated with ultrasound.    - US Hernia Evaluation; Future    Shortening, leg, congenital, right  As above.   - Orthotics, Mastectomy and Custom Compression Orders                Inder Glover is a 74 year old, presenting for the following health issues:  Musculoskeletal Problem (Hip pain in right hip for 6 weeks. Can't sleep due to discomfort. )    History of Present Illness       Back Pain:  He presents for follow up of back pain. Patient's back pain is a new problem.    Original cause of back pain: not sure  First noticed back pain: more than 1 month ago  Patient feels back pain: constantlyLocation of back pain:  Right lower back and right buttock  Description of back pain: sharp  Back pain spreads: right thigh    Since patient first noticed back pain, pain is: always present, but gets better and worse  Does back pain interfere with his job:  Not applicable  On a scale of 1-10 (10 being the worst), patient describes pain as:  6  What makes back pain worse: lying down and sitting   Acupuncture: not tried  Acetaminophen: helpful  Activity or exercise: not helpful  Chiropractor:   Not tried  Cold: not tried  Heat: not helpful  Massage: not helpful  Muscle relaxants: not tried  NSAIDS: not tried  Opioids: not tried  Physical Therapy: not tried  Rest: not helpful  Steroid Injection: not tried  Stretching: not helpful  Surgery: not tried  TENS unit: not tried  Topical pain relievers: not tried  Other healthcare providers patient is seeing for back pain: None He is missing 1 dose(s) of medications per week.  He is not taking prescribed medications regularly due to remembering to take.     Nothing that patient remembers that off set the back pain. Difficulty sleeping due to pain. No numbness of tingling down leg. Can have sharp ache at times.     Patient reports history of inguinal hernia. Previously evaluated by his primary and seemed like it was ok.     Right inguinal hernia repair, on examination in May was not noted to have any recurring hernia.     Patient doesn't feel like PT would be helpful with back. Has been doing tylenol 650 at bed time.                   Objective    /82   Pulse 76   Temp 98.1  F (36.7  C) (Oral)   Resp 14   Wt 62.2 kg (137 lb 3.2 oz)   SpO2 97%   BMI 23.71 kg/m    Body mass index is 23.71 kg/m .  Physical Exam  Constitutional:       Appearance: Normal appearance.   Musculoskeletal:      Lumbar back: Tenderness (right side) present. No bony tenderness. Negative right straight leg raise test and negative left straight leg raise test.   Neurological:      General: No focal deficit present.      Mental Status: He is alert and oriented to person, place, and time.   Psychiatric:         Mood and Affect: Mood normal.         Behavior: Behavior normal.            No results found for any visits on 09/04/24.        Signed Electronically by: JONATHAN DUENAS CNP

## 2024-09-05 ENCOUNTER — HOSPITAL ENCOUNTER (OUTPATIENT)
Dept: ULTRASOUND IMAGING | Facility: CLINIC | Age: 74
Discharge: HOME | End: 2024-09-05
Attending: NURSE PRACTITIONER | Admitting: NURSE PRACTITIONER
Payer: COMMERCIAL

## 2024-09-05 DIAGNOSIS — K40.90 RIGHT INGUINAL HERNIA: ICD-10-CM

## 2024-09-05 PROCEDURE — 76705 ECHO EXAM OF ABDOMEN: CPT

## 2024-09-19 NOTE — PROGRESS NOTES
ASSESSMENT: Nate North is a 74 year old male with past medical history significant for hypertension, BPH, depression, prediabetes, memory loss who presents today for new patient evaluation of a 2-month history of right low back/upper buttock pain.  Patient thinks it may have been related to lifting something too heavy but denies any specific injury or event to cause the pain.  Pain was severe initially and has improved somewhat but he reached a plateau in his improvement.  An x-ray lumbar spine shows mild wedge compression deformity of L3 which could represent a fracture.  There is also a chronic compression deformity of T12.  There is severe disc degeneration L5-S1 and moderate disc degeneration L2-3 and L3-4.  Patient is scheduled for an MRI lumbar spine October 22, 2024 to determine the acuity of the fracture.  Patient does not have any radicular symptoms.  He is neurologically intact.    PLAN:  A shared decision making model was used.  The patient's values and choices were respected.  The following represents what was discussed and decided upon by the physician assistant and the patient.      1.  DIAGNOSTIC TESTS:  - Reviewed the x-ray lumbar spine.  - MRI lumbar spine is scheduled for October 22, 2024.  Will have him talk with our schedulers to see if that could be moved up.  I would like to have the MRI done sooner than later so that we can determine next Epson treatment depending on whether or not he has an acute fracture.  - If there is an acute fracture would recommend a DEXA scan to evaluate for osteoporosis since there was no significant trauma recently.    2.  PHYSICAL THERAPY: No physical therapy was ordered.  Will await the results of the MRI.  As long as there is no acute fracture I would recommend a referral to physical therapy.    3.  MEDICATIONS: No changes are made to the patient's medications.  - Patient takes Tylenol at bedtime as needed.  - If there is any acute fracture would add  calcitonin nasal spray.    4.  INTERVENTIONS:  No interventions were ordered today.  We will await the results of his MRI.  I would not recommend any interventional pain management in the setting of an acute fracture.  If there is no acute fracture we could consider interventional pain management if it fails to improve with physical therapy.  Based on his exam today I think we could consider lumbar medial branch blocks versus right sacroiliac joint injection versus possibly an epidural steroid injection on the right.    5.  PATIENT EDUCATION:  - Patient is also looking to get a new set of orthotics.  He was told that he had a leg length discrepancy decades ago and has been wearing the same orthotics ever since.  I see that his primary care provider had put in an order for orthotics.  We gave him the phone number to schedule an appointment.  - If there is an acute fracture I would recommend activity restrictions including no lifting more than a gallon of milk, avoid bending and twisting.    6.  REFERRALS:  - If there is an acute fracture would also offer a referral to orthotics for an LSO brace to be worn as needed.  - I would also recommend a referral to endocrinology.    7.  FOLLOW-UP:   Patient will follow-up with me after his MRI lumbar spine to review the results and discuss treatment options.  If he has questions or concerns in the meantime, he should not hesitate to call.      SUBJECTIVE:  Nate North  Is a 74 year old male who presents today in consultation at the request of Naz Delacruz CNP for new patient evaluation of low back pain.  Patient reports that he began to have pain in July.  He denies any specific injury or event to cause the pain but he thinks he might of lifted something too heavy around the time his pain began.  For the first couple days pain was so severe he could hardly get out of bed.  He had significant difficulty walking and had to shuffle his gait.  Gradually his pain improved  somewhat but he feels like he has reached a plateau in his progress.  He continues to have some days where pain is more severe.    In thinking about prior injuries to his spine patient reports that he fell off of a ladder 2 weeks ago onto grass.  He denies any worsening of pain after that.  He also reports that he slipped and fell down steps years ago, with his low back striking the edge of a step.    Patient complains of right-sided low back/upper buttock pain.  He denies any pain down the legs.  Denies any numbness, tingling, weakness down the legs.  Pain is worse when he first wakes up in the morning.  He also has more pain when he is trying to sleep at night.  It is very difficult for him to get comfortable.  He is not naturally a back sleeper but he has been sleeping primarily on his back because this is the least painful position for him.  He feels better when he is more active.  He denies loss of bowel or bladder control.  Denies recent fevers.    Treatment to date:  - No physical therapy  - No chiropractic treatment  - No spine injections  - No spine surgeries  - Tylenol 650 mg at bedtime    Current Outpatient Medications   Medication Sig Dispense Refill    diclofenac (VOLTAREN) 1 % topical gel Apply 4 g topically 4 times daily. 350 g 2    lisinopril (ZESTRIL) 20 MG tablet TAKE ONE TABLET BY MOUTH EVERY DAY 90 tablet 2    loperamide (IMODIUM) 2 MG capsule Take 2 mg by mouth 4 times daily as needed for diarrhea      sertraline (ZOLOFT) 50 MG tablet TAKE ONE TABLET BY MOUTH EVERY DAY 90 tablet 2     No current facility-administered medications for this visit.       No Known Allergies    Past Medical History:   Diagnosis Date    Anxiety     Work related. Minor attacks lasting 10-15 seconds. Substantial improvement since prison. Sense of disconnectedness         Patient Active Problem List   Diagnosis    Degenerative cervical disc    BPH (benign prostatic hyperplasia)    Prediabetes    Actinic keratitis     Hypertension    Memory loss    Depression    Inguinal hernia    Physical deconditioning       Past Surgical History:   Procedure Laterality Date    ARTHROSCOPY SHOULDER ROTATOR CUFF REPAIR Right 2007    INGUINAL HERNIA REPAIR Right     VASECTOMY  1997            Family History   Problem Relation Age of Onset    Ovarian Cancer Mother     Benign prostatic hyperplasia Father     Skin Cancer Father         Not Melanoma    Kidney failure Father     Glaucoma Sister     Glaucoma Sister     Skin Cancer Brother     Glaucoma Brother        Social history: Patient is .  He is retired.  Drinks several alcoholic beverages daily.  Denies tobacco use.  Denies recreational drug use.      ROS: Positive for headache, ringing in ears, changes of vision, joint pain, dizziness.  Specifically negative for bowel/bladder dysfunction, fevers,chills, appetite changes, unexplained weight loss.   Otherwise 13 systems reviewed are negative.  Please see the patient's intake questionnaire from today for details.      OBJECTIVE:  PHYSICAL EXAMINATION:    CONSTITUTIONAL:  Vital signs as above.  No acute distress.  The patient is well nourished and well groomed.  PSYCHIATRIC:  The patient is awake, alert, oriented to person, place, time and answering questions appropriately with clear speech.    HEENT: Normocephalic, atraumatic.  Sclera clear.  Neck is supple.  SKIN:  Skin over the face, bilateral lower extremities, and posterior torso is clean, dry, intact without rashes.    GAIT:  Gait is non-antalgic.  The patient is able to heel and toe walk without significant difficulty.    STANDING EXAMINATION: No midline lumbar spine tenderness.  No tenderness bilateral lower lumbar paraspinous muscles.  No tenderness to palpation bilateral sacroiliac joints.  Lumbar flexion is moderately restricted.  Lumbar extension is within normal limits.  MUSCLE STRENGTH:  The patient has 5/5 strength for the bilateral hip flexors, knee flexors/extensors, ankle  dorsiflexors/plantar flexors, great toe extensors.  NEUROLOGICAL: 2+ patellar, and 1+ achilles reflexes bilaterally.  Negative Babinski's bilaterally.  No ankle clonus bilaterally. Sensation to light touch is intact in the bilateral L4, L5, and S1 dermatomes.  VASCULAR:  2/4 dorsalis pedis pulses bilaterally.  Bilateral lower extremities are warm.  There is no pitting edema of the bilateral lower extremities.  ABDOMINAL:  Soft, non-distended, non-tender throughout all quadrants.  No pulsatile mass palpated in the left lower quadrant.  LYMPH NODES:  No palpable or tender inguinal lymph nodes.  MUSCULOSKELETAL: Straight leg raise is negative bilaterally.    RESULTS: I reviewed the x-ray lumbar spine from [facility] dated September 4, 2024.  This shows mild wedge compression deformity of L3.  There is also a mild deformity of superior T12 endplate which is stable compared with July 18, 2021.  There is severe disc degeneration L5-S1 and moderate disc degeneration at L2-3 and L3-4.

## 2024-09-23 ENCOUNTER — OFFICE VISIT (OUTPATIENT)
Dept: PHYSICAL MEDICINE AND REHAB | Facility: CLINIC | Age: 74
End: 2024-09-23
Attending: NURSE PRACTITIONER
Payer: COMMERCIAL

## 2024-09-23 VITALS
SYSTOLIC BLOOD PRESSURE: 158 MMHG | HEART RATE: 81 BPM | HEIGHT: 64 IN | BODY MASS INDEX: 23.51 KG/M2 | WEIGHT: 137.7 LBS | DIASTOLIC BLOOD PRESSURE: 84 MMHG

## 2024-09-23 DIAGNOSIS — M54.50 ACUTE RIGHT-SIDED LOW BACK PAIN WITHOUT SCIATICA: ICD-10-CM

## 2024-09-23 PROCEDURE — 99204 OFFICE O/P NEW MOD 45 MIN: CPT | Performed by: PHYSICIAN ASSISTANT

## 2024-09-23 ASSESSMENT — PAIN SCALES - GENERAL: PAINLEVEL: SEVERE PAIN (6)

## 2024-09-23 NOTE — LETTER
9/23/2024      Nate North  [address] [address]      Dear Colleague,    Thank you for referring your patient, Nate North, to the [facility]. Please see a copy of my visit note below.    ASSESSMENT: Nate North is a 74 year old male with past medical history significant for hypertension, BPH, depression, prediabetes, memory loss who presents today for new patient evaluation of a 2-month history of right low back/upper buttock pain.  Patient thinks it may have been related to lifting something too heavy but denies any specific injury or event to cause the pain.  Pain was severe initially and has improved somewhat but he reached a plateau in his improvement.  An x-ray lumbar spine shows mild wedge compression deformity of L3 which could represent a fracture.  There is also a chronic compression deformity of T12.  There is severe disc degeneration L5-S1 and moderate disc degeneration L2-3 and L3-4.  Patient is scheduled for an MRI lumbar spine October 22, 2024 to determine the acuity of the fracture.  Patient does not have any radicular symptoms.  He is neurologically intact.    PLAN:  A shared decision making model was used.  The patient's values and choices were respected.  The following represents what was discussed and decided upon by the physician assistant and the patient.      1.  DIAGNOSTIC TESTS:  - Reviewed the x-ray lumbar spine.  - MRI lumbar spine is scheduled for October 22, 2024.  Will have him talk with our schedulers to see if that could be moved up.  I would like to have the MRI done sooner than later so that we can determine next Epson treatment depending on whether or not he has an acute fracture.  - If there is an acute fracture would recommend a DEXA scan to evaluate for osteoporosis since there was no significant trauma recently.    2.  PHYSICAL THERAPY: No physical therapy was ordered.  Will await the results of the MRI.  As long as  there is no acute fracture I would recommend a referral to physical therapy.    3.  MEDICATIONS: No changes are made to the patient's medications.  - Patient takes Tylenol at bedtime as needed.  - If there is any acute fracture would add calcitonin nasal spray.    4.  INTERVENTIONS:  No interventions were ordered today.  We will await the results of his MRI.  I would not recommend any interventional pain management in the setting of an acute fracture.  If there is no acute fracture we could consider interventional pain management if it fails to improve with physical therapy.  Based on his exam today I think we could consider lumbar medial branch blocks versus right sacroiliac joint injection versus possibly an epidural steroid injection on the right.    5.  PATIENT EDUCATION:  - Patient is also looking to get a new set of orthotics.  He was told that he had a leg length discrepancy decades ago and has been wearing the same orthotics ever since.  I see that his primary care provider had put in an order for orthotics.  We gave him the phone number to schedule an appointment.  - If there is an acute fracture I would recommend activity restrictions including no lifting more than a gallon of milk, avoid bending and twisting.    6.  REFERRALS:  - If there is an acute fracture would also offer a referral to orthotics for an LSO brace to be worn as needed.  - I would also recommend a referral to endocrinology.    7.  FOLLOW-UP:   Patient will follow-up with me after his MRI lumbar spine to review the results and discuss treatment options.  If he has questions or concerns in the meantime, he should not hesitate to call.      SUBJECTIVE:  Nate North  Is a 74 year old male who presents today in consultation at the request of Naz Delacruz CNP for new patient evaluation of low back pain.  Patient reports that he began to have pain in July.  He denies any specific injury or event to cause the pain but he thinks he might  of lifted something too heavy around the time his pain began.  For the first couple days pain was so severe he could hardly get out of bed.  He had significant difficulty walking and had to shuffle his gait.  Gradually his pain improved somewhat but he feels like he has reached a plateau in his progress.  He continues to have some days where pain is more severe.    In thinking about prior injuries to his spine patient reports that he fell off of a ladder 2 weeks ago onto grass.  He denies any worsening of pain after that.  He also reports that he slipped and fell down steps years ago, with his low back striking the edge of a step.    Patient complains of right-sided low back/upper buttock pain.  He denies any pain down the legs.  Denies any numbness, tingling, weakness down the legs.  Pain is worse when he first wakes up in the morning.  He also has more pain when he is trying to sleep at night.  It is very difficult for him to get comfortable.  He is not naturally a back sleeper but he has been sleeping primarily on his back because this is the least painful position for him.  He feels better when he is more active.  He denies loss of bowel or bladder control.  Denies recent fevers.    Treatment to date:  - No physical therapy  - No chiropractic treatment  - No spine injections  - No spine surgeries  - Tylenol 650 mg at bedtime    Current Outpatient Medications   Medication Sig Dispense Refill     diclofenac (VOLTAREN) 1 % topical gel Apply 4 g topically 4 times daily. 350 g 2     lisinopril (ZESTRIL) 20 MG tablet TAKE ONE TABLET BY MOUTH EVERY DAY 90 tablet 2     loperamide (IMODIUM) 2 MG capsule Take 2 mg by mouth 4 times daily as needed for diarrhea       sertraline (ZOLOFT) 50 MG tablet TAKE ONE TABLET BY MOUTH EVERY DAY 90 tablet 2     No current facility-administered medications for this visit.       No Known Allergies    Past Medical History:   Diagnosis Date     Anxiety     Work related. Minor attacks  lasting 10-15 seconds. Substantial improvement since [facility]. Sense of disconnectedness         Patient Active Problem List   Diagnosis     Degenerative cervical disc     BPH (benign prostatic hyperplasia)     Prediabetes     Actinic keratitis     Hypertension     Memory loss     Depression     Inguinal hernia     Physical deconditioning       Past Surgical History:   Procedure Laterality Date     ARTHROSCOPY SHOULDER ROTATOR CUFF REPAIR Right 2007     INGUINAL HERNIA REPAIR Right      VASECTOMY  1997            Family History   Problem Relation Age of Onset     Ovarian Cancer Mother      Benign prostatic hyperplasia Father      Skin Cancer Father         Not Melanoma     Kidney failure Father      Glaucoma Sister      Glaucoma Sister      Skin Cancer Brother      Glaucoma Brother        Social history: Patient is .  He is retired.  Drinks several alcoholic beverages daily.  Denies tobacco use.  Denies recreational drug use.      ROS: Positive for headache, ringing in ears, changes of vision, joint pain, dizziness.  Specifically negative for bowel/bladder dysfunction, fevers,chills, appetite changes, unexplained weight loss.   Otherwise 13 systems reviewed are negative.  Please see the patient's intake questionnaire from today for details.      OBJECTIVE:  PHYSICAL EXAMINATION:    CONSTITUTIONAL:  Vital signs as above.  No acute distress.  The patient is well nourished and well groomed.  PSYCHIATRIC:  The patient is awake, alert, oriented to person, place, time and answering questions appropriately with clear speech.    HEENT: Normocephalic, atraumatic.  Sclera clear.  Neck is supple.  SKIN:  Skin over the face, bilateral lower extremities, and posterior torso is clean, dry, intact without rashes.    GAIT:  Gait is non-antalgic.  The patient is able to heel and toe walk without significant difficulty.    STANDING EXAMINATION: No midline lumbar spine tenderness.  No tenderness bilateral lower lumbar  paraspinous muscles.  No tenderness to palpation bilateral sacroiliac joints.  Lumbar flexion is moderately restricted.  Lumbar extension is within normal limits.  MUSCLE STRENGTH:  The patient has 5/5 strength for the bilateral hip flexors, knee flexors/extensors, ankle dorsiflexors/plantar flexors, great toe extensors.  NEUROLOGICAL: 2+ patellar, and 1+ achilles reflexes bilaterally.  Negative Babinski's bilaterally.  No ankle clonus bilaterally. Sensation to light touch is intact in the bilateral L4, L5, and S1 dermatomes.  VASCULAR:  2/4 dorsalis pedis pulses bilaterally.  Bilateral lower extremities are warm.  There is no pitting edema of the bilateral lower extremities.  ABDOMINAL:  Soft, non-distended, non-tender throughout all quadrants.  No pulsatile mass palpated in the left lower quadrant.  LYMPH NODES:  No palpable or tender inguinal lymph nodes.  MUSCULOSKELETAL: Straight leg raise is negative bilaterally.    RESULTS: I reviewed the x-ray lumbar spine from [facility] dated September 4, 2024.  This shows mild wedge compression deformity of L3.  There is also a mild deformity of superior T12 endplate which is stable compared with July 18, 2021.  There is severe disc degeneration L5-S1 and moderate disc degeneration at L2-3 and L3-4.      Again, thank you for allowing me to participate in the care of your patient.        Sincerely,        Delicia Cook PA-C

## 2024-10-03 ENCOUNTER — HOSPITAL ENCOUNTER (OUTPATIENT)
Dept: MRI IMAGING | Facility: CLINIC | Age: 74
Discharge: HOME | End: 2024-10-03
Attending: NURSE PRACTITIONER | Admitting: NURSE PRACTITIONER
Payer: COMMERCIAL

## 2024-10-03 DIAGNOSIS — M54.50 ACUTE RIGHT-SIDED LOW BACK PAIN WITHOUT SCIATICA: ICD-10-CM

## 2024-10-03 PROCEDURE — 72148 MRI LUMBAR SPINE W/O DYE: CPT

## 2024-10-03 NOTE — PROGRESS NOTES
Assessment:   Nate North is a 74 year old y.o. male with past medical history significant for hypertension, BPH, depression, prediabetes, memory loss  who presents today for follow-up regarding  low back/upper buttock pain.  My review of an MRI lumbar spine shows an acute L3 inferior endplate compression fracture with mild to moderate vertebral body height loss.  There is no retropulsion.  There is also multilevel degenerative changes with up to moderate spinal canal stenosis at L4-5 and moderate to severe right L3-4 foraminal stenosis.  Fracture pain presumably occurred in early September when he fell from a ladder.  Patient does not have any radicular symptoms and he is neurologically intact.  Plan will be to monitor the fracture conservatively with activity restrictions, bracing, calcitonin, and we will monitor with serial x-rays.       Plan:     A shared decision making plan was used.  The patient's values and choices were respected.  The following represents what was discussed and decided upon by the physician assistant and the patient.      1.  DIAGNOSTIC TESTS:  I reviewed the MRI lumbar spine.  -I ordered follow-up AP and lateral lumbar spine x-rays to be performed in approximately 4 weeks to monitor the fracture.  Plan will be to get x-rays monthly until he is 3 months out from his injury (early December).  - I also ordered a DEXA scan to evaluate for osteoporosis.    2.  PHYSICAL THERAPY: No physical therapy was ordered.  We will give the fracture more time to heal first.  If patient continues to have pain after fracture has healed I recommend a referral to neurosurgery.    3.  MEDICATIONS:  - I prescribed calcitonin nasal spray.  - Patient takes Tylenol at bedtime as needed.      4.  INTERVENTIONS: No interventions were ordered.    5.  REFERRALS:  - Entered a referral to orthotics to get an off-the-shelf LSO.  He can wear this when he is up out of bed.  - If DEXA scan shows osteoporosis I would also  recommend a referral to endocrinology.    6.  PATIENT EDUCATION: I recommended that the patient not lift more than approximately 7 pounds.  He should avoid bending and twisting.    7.  FOLLOW-UP: Patient will follow-up with me in 4 weeks with AP and lateral lumbar spine x-rays.  If he has questions or concerns in the meantime, he should not hesitate to call.    Subjective:     Nate North is a 74 year old male who presents today for follow-up regarding low back pain.  I saw the patient in consultation September 23, 2024.  He returns today to review his MRI results.  He reports that since he was last seen his pain has changed to a bilateral distribution (previously right-sided only).    Patient complains of bilateral low back/upper buttock pain.  Patient denies any pain down the legs.  Denies any numbness, tingling, weakness down the legs.  He rates his pain today as a 6 out of 10.  At its worst it is a 9 out of 10.  At his best it is a 4 out of 10.  Patient denies any aggravating or alleviating factors to the pain.  Denies any new symptoms since he was last seen.    Treatment to date:  - No physical therapy  - No chiropractic treatment  - No spine injections  - No spine surgeries  - Tylenol 650 mg at bedtime  - Aspirin    Review of Systems:  Positive for headache, pain worse at night, difficulty with hand skills.  Negative for numbness/tingling, weakness, loss of bowel/bladder control, inability to urinate, trip/stumble/falls, difficulty swallowing, fevers, unintentional weight loss.     Objective:   CONSTITUTIONAL:  Vital signs as above.  No acute distress.  The patient is well nourished and well groomed.    PSYCHIATRIC:  The patient is awake, alert, oriented to person, place and time.  The patient is answering questions appropriately with clear speech.  Normal affect.  HEENT: Normocephalic, atraumatic.  Sclera clear.    SKIN: Exposed skin is clean, dry, intact without rashes.  MUSCULOSKELETAL:  Gait is  non-antalgic.  The patient is able to rise onto toes and heels bilaterally support.  No tenderness to palpation midline lumbar spine.  No tenderness palpation bilateral lumbar paraspinous muscles.   The patient has 5/5 strength for the bilateral hip flexors, knee flexors/extensors, ankle dorsiflexors/plantar flexors.  NEUROLOGICAL:   Sensation to light touch is intact in the bilateral L4, L5, and S1 dermatomes.       RESULTS:    I reviewed the MRI lumbar spine from October 3, 2024.  This shows an acute L3 inferior endplate compression fracture with mild to moderate vertebral body height loss.  There is also a chronic T12 compression fracture.  There is multilevel spondylosis.  At L1-L2 there is mild to moderate spinal canal stenosis with mild bilateral foraminal stenosis.  At L2-3 there is mild spinal canal stenosis and mild to moderate bilateral foraminal stenosis.  At L3-4 there is mild to moderate central canal stenosis with moderate to severe right and mild to moderate left foraminal stenosis.  At L4-5 there is moderate spinal canal stenosis with moderate right and mild to moderate left foraminal stenosis.  At L5-S1 there is mild to moderate spinal canal stenosis.

## 2024-10-07 ENCOUNTER — OFFICE VISIT (OUTPATIENT)
Dept: PHYSICAL MEDICINE AND REHAB | Facility: CLINIC | Age: 74
End: 2024-10-07
Payer: COMMERCIAL

## 2024-10-07 VITALS
WEIGHT: 137 LBS | TEMPERATURE: 97.7 F | BODY MASS INDEX: 23.39 KG/M2 | HEART RATE: 7 BPM | DIASTOLIC BLOOD PRESSURE: 65 MMHG | SYSTOLIC BLOOD PRESSURE: 136 MMHG | HEIGHT: 64 IN

## 2024-10-07 DIAGNOSIS — S32.030D CLOSED COMPRESSION FRACTURE OF L3 LUMBAR VERTEBRA, WITH ROUTINE HEALING, SUBSEQUENT ENCOUNTER: Primary | ICD-10-CM

## 2024-10-07 DIAGNOSIS — S32.038A: ICD-10-CM

## 2024-10-07 PROCEDURE — 99214 OFFICE O/P EST MOD 30 MIN: CPT | Performed by: PHYSICIAN ASSISTANT

## 2024-10-07 RX ORDER — CALCITONIN SALMON 200 [IU]/.09ML
1 SPRAY, METERED NASAL DAILY
Qty: 3.7 ML | Refills: 0 | Status: SHIPPED | OUTPATIENT
Start: 2024-10-07

## 2024-10-07 ASSESSMENT — PAIN SCALES - GENERAL: PAINLEVEL: SEVERE PAIN (6)

## 2024-10-07 NOTE — PATIENT INSTRUCTIONS
Activity restrictions: no lifting more than 7 pounds, avoid bending and twisting.    Wear your back brace when you're up out of bed to help with pain.    I will see you back in four weeks with xrays to monitor the fracture.    Someone will call you to schedule your dexa scan (test for bone density).  If you have weak bones (osteoporosis), I will enter a referral for endocrinology to get medicine to help strengthen your bones.

## 2024-10-07 NOTE — LETTER
10/7/2024      Nate North  [address] [facility] [address]      Dear Colleague,    Thank you for referring your patient, Nate North, to the [facility] SP[facility]UROSURGERY. Please see a copy of my visit note below.    Assessment:   Nate North is a 74 year old y.o. male with past medical history significant for hypertension, BPH, depression, prediabetes, memory loss  who presents today for follow-up regarding  low back/upper buttock pain.  My review of an MRI lumbar spine shows an acute L3 inferior endplate compression fracture with mild to moderate vertebral body height loss.  There is no retropulsion.  There is also multilevel degenerative changes with up to moderate spinal canal stenosis at L4-5 and moderate to severe right L3-4 foraminal stenosis.  Fracture pain presumably occurred in early September when he fell from a ladder.  Patient does not have any radicular symptoms and he is neurologically intact.  Plan will be to monitor the fracture conservatively with activity restrictions, bracing, calcitonin, and we will monitor with serial x-rays.       Plan:     A shared decision making plan was used.  The patient's values and choices were respected.  The following represents what was discussed and decided upon by the physician assistant and the patient.      1.  DIAGNOSTIC TESTS:  I reviewed the MRI lumbar spine.  -I ordered follow-up AP and lateral lumbar spine x-rays to be performed in approximately 4 weeks to monitor the fracture.  Plan will be to get x-rays monthly until he is 3 months out from his injury (early December).  - I also ordered a DEXA scan to evaluate for osteoporosis.    2.  PHYSICAL THERAPY: No physical therapy was ordered.  We will give the fracture more time to heal first.  If patient continues to have pain after fracture has healed I recommend a referral to neurosurgery.    3.  MEDICATIONS:  - I prescribed calcitonin nasal spray.  - Patient takes Tylenol at  bedtime as needed.      4.  INTERVENTIONS: No interventions were ordered.    5.  REFERRALS:  - Entered a referral to orthotics to get an off-the-shelf LSO.  He can wear this when he is up out of bed.  - If DEXA scan shows osteoporosis I would also recommend a referral to endocrinology.    6.  PATIENT EDUCATION: I recommended that the patient not lift more than approximately 7 pounds.  He should avoid bending and twisting.    7.  FOLLOW-UP: Patient will follow-up with me in 4 weeks with AP and lateral lumbar spine x-rays.  If he has questions or concerns in the meantime, he should not hesitate to call.    Subjective:     Nate North is a 74 year old male who presents today for follow-up regarding low back pain.  I saw the patient in consultation September 23, 2024.  He returns today to review his MRI results.  He reports that since he was last seen his pain has changed to a bilateral distribution (previously right-sided only).    Patient complains of bilateral low back/upper buttock pain.  Patient denies any pain down the legs.  Denies any numbness, tingling, weakness down the legs.  He rates his pain today as a 6 out of 10.  At its worst it is a 9 out of 10.  At his best it is a 4 out of 10.  Patient denies any aggravating or alleviating factors to the pain.  Denies any new symptoms since he was last seen.    Treatment to date:  - No physical therapy  - No chiropractic treatment  - No spine injections  - No spine surgeries  - Tylenol 650 mg at bedtime  - Aspirin    Review of Systems:  Positive for headache, pain worse at night, difficulty with hand skills.  Negative for numbness/tingling, weakness, loss of bowel/bladder control, inability to urinate, trip/stumble/falls, difficulty swallowing, fevers, unintentional weight loss.     Objective:   CONSTITUTIONAL:  Vital signs as above.  No acute distress.  The patient is well nourished and well groomed.    PSYCHIATRIC:  The patient is awake, alert, oriented to  person, place and time.  The patient is answering questions appropriately with clear speech.  Normal affect.  HEENT: Normocephalic, atraumatic.  Sclera clear.    SKIN: Exposed skin is clean, dry, intact without rashes.  MUSCULOSKELETAL:  Gait is non-antalgic.  The patient is able to rise onto toes and heels bilaterally support.  No tenderness to palpation midline lumbar spine.  No tenderness palpation bilateral lumbar paraspinous muscles.   The patient has 5/5 strength for the bilateral hip flexors, knee flexors/extensors, ankle dorsiflexors/plantar flexors.  NEUROLOGICAL:   Sensation to light touch is intact in the bilateral L4, L5, and S1 dermatomes.       RESULTS:    I reviewed the MRI lumbar spine from October 3, 2024.  This shows an acute L3 inferior endplate compression fracture with mild to moderate vertebral body height loss.  There is also a chronic T12 compression fracture.  There is multilevel spondylosis.  At L1-L2 there is mild to moderate spinal canal stenosis with mild bilateral foraminal stenosis.  At L2-3 there is mild spinal canal stenosis and mild to moderate bilateral foraminal stenosis.  At L3-4 there is mild to moderate central canal stenosis with moderate to severe right and mild to moderate left foraminal stenosis.  At L4-5 there is moderate spinal canal stenosis with moderate right and mild to moderate left foraminal stenosis.  At L5-S1 there is mild to moderate spinal canal stenosis.      Again, thank you for allowing me to participate in the care of your patient.        Sincerely,        Delicia Cook PA-C

## 2024-10-14 ENCOUNTER — ANCILLARY PROCEDURE (OUTPATIENT)
Dept: GENERAL RADIOLOGY | Facility: CLINIC | Age: 74
End: 2024-10-14
Attending: PHYSICIAN ASSISTANT
Payer: COMMERCIAL

## 2024-10-14 DIAGNOSIS — S32.030D CLOSED COMPRESSION FRACTURE OF L3 LUMBAR VERTEBRA, WITH ROUTINE HEALING, SUBSEQUENT ENCOUNTER: ICD-10-CM

## 2024-10-14 PROCEDURE — 72100 X-RAY EXAM L-S SPINE 2/3 VWS: CPT | Mod: TC | Performed by: STUDENT IN AN ORGANIZED HEALTH CARE EDUCATION/TRAINING PROGRAM

## 2024-10-19 ENCOUNTER — HEALTH MAINTENANCE LETTER (OUTPATIENT)
Age: 74
End: 2024-10-19

## 2024-10-21 ENCOUNTER — HOSPITAL ENCOUNTER (OUTPATIENT)
Dept: BONE DENSITY | Facility: HOSPITAL | Age: 74
Discharge: HOME | End: 2024-10-21
Attending: PHYSICIAN ASSISTANT | Admitting: PHYSICIAN ASSISTANT
Payer: COMMERCIAL

## 2024-10-21 DIAGNOSIS — S32.030D CLOSED COMPRESSION FRACTURE OF L3 LUMBAR VERTEBRA, WITH ROUTINE HEALING, SUBSEQUENT ENCOUNTER: ICD-10-CM

## 2024-10-21 DIAGNOSIS — S32.038A: ICD-10-CM

## 2024-10-21 PROCEDURE — 77080 DXA BONE DENSITY AXIAL: CPT

## 2024-10-22 ENCOUNTER — TELEPHONE (OUTPATIENT)
Dept: PHYSICAL MEDICINE AND REHAB | Facility: CLINIC | Age: 74
End: 2024-10-22
Payer: COMMERCIAL

## 2024-10-22 DIAGNOSIS — M80.00XD AGE-RELATED OSTEOPOROSIS WITH CURRENT PATHOLOGICAL FRACTURE WITH ROUTINE HEALING, SUBSEQUENT ENCOUNTER: Primary | ICD-10-CM

## 2024-10-22 NOTE — TELEPHONE ENCOUNTER
Other: pt called is wondering if he will need to see provider on 11/04 since provider is referring pt to see endocrinologist. Please call back and confirm.       Could we send this information to you in [place] or would you prefer to receive a phone call?:   Patient would prefer a phone call   Okay to leave a detailed message?: Yes at Home number on file [phone] (home)

## 2024-10-22 NOTE — TELEPHONE ENCOUNTER
Call placed to pt. Advised he keep his appt on 11/4 as Delicia had wanted 4 week follow-up after last appt.   Pt does want it noted he got scheduled with endocrinology and the soonest he could be seen was 6/2025. He will plan to discuss this with Delicia at the upcoming appt to see if there is anything that could be done in the meantime.

## 2024-10-31 NOTE — PROGRESS NOTES
Assessment:   Nate North is a 74 year old y.o. male with past medical history significant for hypertension, BPH, depression, prediabetes, memory loss  who presents today for follow-up regarding 2 concerns:  1.  Right low back/upper buttock pain.  My review of an MRI lumbar spine October 3, 2024 shows an acute L3 inferior endplate compression fracture with mild to moderate vertebral body height loss.  There is no retropulsion.  There is also multilevel degenerative changes with up to moderate spinal canal stenosis at L4-5 and moderate to severe right L3-4 foraminal stenosis.  Fracture pain presumably occurred in early September when he fell from a ladder.  Pain is gradually improving as expected.  2.  Left neck pain.  Patient is a prior history of neck pain related to cervical disc degeneration which was treated with physical therapy/traction in the 80s/90s.  Patient feels neck pain flared up after his fall.  This patient is neurologically intact.       Plan:     A shared decision making plan was used.  The patient's values and choices were respected.  The following represents what was discussed and decided upon by the physician assistant and the patient.      1.  DIAGNOSTIC TESTS:  - I reviewed the MRI lumbar spine.  - I reviewed the x-ray lumbar spine from October 14, 2024.  - I ordered follow-up AP and lateral lumbar spine x-rays to be performed in 4 weeks (12 weeks after his injury).  - Also ordered an MRI cervical spine.  - DEXA scan showed osteoporosis.      2.  PHYSICAL THERAPY: No physical therapy was ordered.  In regards to the back pain we will give the fracture more time to heal first.  If patient continues to have pain after fracture has healed I recommend a referral to physical therapy.  - In regards to the neck pain, we will await the MRI results.  At that time I will likely recommend a referral to physical therapy.    3.  MEDICATIONS:  - Patient can continue calcitonin nasal spray.  - Patient  takes Tylenol at bedtime as needed.  - We could consider adding a muscle relaxer if needed.      4.  INTERVENTIONS: No interventions were ordered.    5.  REFERRALS:  - Patient is scheduled to see endocrinology June 2025.    6.  PATIENT EDUCATION: I recommended patient continue to wear his LSO brace when he is up and about for pain management.  I recommended that the patient not lift more than approximately 7 pounds.  He should avoid bending and twisting.  I recommended he not do any yard work or snow removal for the next 1 month.    7.  FOLLOW-UP: My nurse to call the patient with results of his MRI cervical spine.  I would likely recommend a physical therapy referral at that time.  For the low back pain he will follow-up with me in 4 weeks after his AP and lateral lumbar spine x-rays.  If he has questions or concerns in the meantime, he should not hesitate to call.    Subjective:     Nate North is a 74 year old male who presents today for follow-up regarding low back pain related to an L3 compression fracture which presumably occurred in early September when he fell from a ladder.  Patient reports gradual improvement in low back pain.  He has mild residual right-sided low back pain.  Denies any pain down the legs.  Denies any numbness, tingling, weakness down the legs.    Patient brings forward a new complaint today of left neck pain.  Patient reports that he has a prior history of neck pain related to cervical disc degeneration.  He was treated with physical therapy and traction in the 80s and 90s.  Patient feels that his fall in September aggravated his neck.  He continues to have left-sided neck pain.  Pain radiates into the upper trapezius muscle.  Denies any pain down the arms.  Denies numbness, tingling, weakness down the arms.  He does have headaches.    Treatment to date:  - No physical therapy  - No chiropractic treatment  - No spine injections  - No spine surgeries  - Tylenol 650 mg at bedtime  -  Aspirin    Review of Systems:  Positive for headache.  Negative for numbness/tingling, weakness, loss of bowel/bladder control, inability to urinate, pain much worse at night, trip//falls, difficulty swallowing, difficulty with hand skills, fevers, unintentional weight loss.     Objective:   CONSTITUTIONAL:  Vital signs as above.  No acute distress.  The patient is well nourished and well groomed.    PSYCHIATRIC:  The patient is awake, alert, oriented to person, place and time.  The patient is answering questions appropriately with clear speech.  Normal affect.  HEENT: Normocephalic, atraumatic.  Sclera clear.    SKIN: Exposed skin is clean, dry, intact without rashes.  MUSCULOSKELETAL:  Gait is non-antalgic.  No tenderness palpation midline lumbar spine.  No significant tenderness palpation bilateral lower lumbar paraspinous muscles.  Tender to palpation left cervical paraspinous muscles.  Hypertonicity left upper trapezius muscle.  5/5 strength bilateral shoulder abductors, elbow flexors/extensors, wrist extensors, finger flexors/abductors, hip flexors, knee flexors/extensors, ankle dorsi/plantar flexors.  The patient has 5/5 strength for the bilateral hip flexors, knee flexors/extensors, ankle dorsiflexors/plantar flexors.  NEUROLOGICAL:   Sensation to light touch is intact in the bilateral upper extremities throughout and in the bilateral L4, L5, and S1 dermatomes.       RESULTS:    I reviewed the MRI lumbar spine from October 3, 2024.  This shows an acute L3 inferior endplate compression fracture with mild to moderate vertebral body height loss.  There is also a chronic T12 compression fracture.  There is multilevel spondylosis.  At L1-L2 there is mild to moderate spinal canal stenosis with mild bilateral foraminal stenosis.  At L2-3 there is mild spinal canal stenosis and mild to moderate bilateral foraminal stenosis.  At L3-4 there is mild to moderate central canal stenosis with moderate to severe right and  mild to moderate left foraminal stenosis.  At L4-5 there is moderate spinal canal stenosis with moderate right and mild to moderate left foraminal stenosis.  At L5-S1 there is mild to moderate spinal canal stenosis.    X-ray lumbar spine October 14, 2024 shows stable L3 compression fracture and chronic T12 compression fracture.    DEXA scan October 21, 2024 shows osteoporosis.

## 2024-11-04 ENCOUNTER — OFFICE VISIT (OUTPATIENT)
Dept: PHYSICAL MEDICINE AND REHAB | Facility: CLINIC | Age: 74
End: 2024-11-04
Payer: COMMERCIAL

## 2024-11-04 VITALS
SYSTOLIC BLOOD PRESSURE: 143 MMHG | WEIGHT: 137 LBS | BODY MASS INDEX: 23.39 KG/M2 | HEIGHT: 64 IN | HEART RATE: 76 BPM | DIASTOLIC BLOOD PRESSURE: 69 MMHG

## 2024-11-04 DIAGNOSIS — S32.030D CLOSED COMPRESSION FRACTURE OF L3 LUMBAR VERTEBRA, WITH ROUTINE HEALING, SUBSEQUENT ENCOUNTER: Primary | ICD-10-CM

## 2024-11-04 DIAGNOSIS — M54.2 CERVICALGIA: ICD-10-CM

## 2024-11-04 PROCEDURE — 99214 OFFICE O/P EST MOD 30 MIN: CPT | Performed by: PHYSICIAN ASSISTANT

## 2024-11-04 RX ORDER — ACETAMINOPHEN 325 MG/1
650 TABLET ORAL DAILY PRN
COMMUNITY

## 2024-11-04 ASSESSMENT — PAIN SCALES - GENERAL: PAINLEVEL_OUTOF10: MILD PAIN (2)

## 2024-11-04 NOTE — PATIENT INSTRUCTIONS
[facility] Scheduling    Please call [phone] to schedule your image(s) (select option#1). There are 3 different locations, see below. You can do walk-in visits for xray only images if you want.     [facility]  [address]  [address] [facility] [place]  [address] [facility] [address], [address]  [place] [address]

## 2024-11-04 NOTE — LETTER
11/4/2024      Nate North  [address] [facility] [address]      Dear Colleague,    Thank you for referring your patient, Nate North, to the [facility] SPINE [facility] NEUROSURGERY. Please see a copy of my visit note below.    Assessment:   Nate North is a 74 year old y.o. male with past medical history significant for hypertension, BPH, depression, prediabetes, memory loss  who presents today for follow-up regarding 2 concerns:  1.  Right low back/upper buttock pain.  My review of an MRI lumbar spine October 3, 2024 shows an acute L3 inferior endplate compression fracture with mild to moderate vertebral body height loss.  There is no retropulsion.  There is also multilevel degenerative changes with up to moderate spinal canal stenosis at L4-5 and moderate to severe right L3-4 foraminal stenosis.  Fracture pain presumably occurred in early September when he fell from a ladder.  Pain is gradually improving as expected.  2.  Left neck pain.  Patient is a prior history of neck pain related to cervical disc degeneration which was treated with physical therapy/traction in the 80s/90s.  Patient feels neck pain flared up after his fall.  This patient is neurologically intact.       Plan:     A shared decision making plan was used.  The patient's values and choices were respected.  The following represents what was discussed and decided upon by the physician assistant and the patient.      1.  DIAGNOSTIC TESTS:  - I reviewed the MRI lumbar spine.  - I reviewed the x-ray lumbar spine from October 14, 2024.  - I ordered follow-up AP and lateral lumbar spine x-rays to be performed in 4 weeks (12 weeks after his injury).  - Also ordered an MRI cervical spine.  - DEXA scan showed osteoporosis.      2.  PHYSICAL THERAPY: No physical therapy was ordered.  In regards to the back pain we will give the fracture more time to heal first.  If patient continues to have pain after fracture has healed I recommend a  referral to physical therapy.  - In regards to the neck pain, we will await the MRI results.  At that time I will likely recommend a referral to physical therapy.    3.  MEDICATIONS:  - Patient can continue calcitonin nasal spray.  - Patient takes Tylenol at bedtime as needed.  - We could consider adding a muscle relaxer if needed.      4.  INTERVENTIONS: No interventions were ordered.    5.  REFERRALS:  - Patient is scheduled to see endocrinology June 2025.    6.  PATIENT EDUCATION: I recommended patient continue to wear his LSO brace when he is up and about for pain management.  I recommended that the patient not lift more than approximately 7 pounds.  He should avoid bending and twisting.  I recommended he not do any yard work or snow removal for the next 1 month.    7.  FOLLOW-UP: My nurse to call the patient with results of his MRI cervical spine.  I would likely recommend a physical therapy referral at that time.  For the low back pain he will follow-up with me in 4 weeks after his AP and lateral lumbar spine x-rays.  If he has questions or concerns in the meantime, he should not hesitate to call.    Subjective:     Nate North is a 74 year old male who presents today for follow-up regarding low back pain related to an L3 compression fracture which presumably occurred in early September when he fell from a ladder.  Patient reports gradual improvement in low back pain.  He has mild residual right-sided low back pain.  Denies any pain down the legs.  Denies any numbness, tingling, weakness down the legs.    Patient brings forward a new complaint today of left neck pain.  Patient reports that he has a prior history of neck pain related to cervical disc degeneration.  He was treated with physical therapy and traction in the 80s and 90s.  Patient feels that his fall in September aggravated his neck.  He continues to have left-sided neck pain.  Pain radiates into the upper trapezius muscle.  Denies any pain  down the arms.  Denies numbness, tingling, weakness down the arms.  He does have headaches.    Treatment to date:  - No physical therapy  - No chiropractic treatment  - No spine injections  - No spine surgeries  - Tylenol 650 mg at bedtime  - Aspirin    Review of Systems:  Positive for headache.  Negative for numbness/tingling, weakness, loss of bowel/bladder control, inability to urinate, pain much worse at night, trip//falls, difficulty swallowing, difficulty with hand skills, fevers, unintentional weight loss.     Objective:   CONSTITUTIONAL:  Vital signs as above.  No acute distress.  The patient is well nourished and well groomed.    PSYCHIATRIC:  The patient is awake, alert, oriented to person, place and time.  The patient is answering questions appropriately with clear speech.  Normal affect.  HEENT: Normocephalic, atraumatic.  Sclera clear.    SKIN: Exposed skin is clean, dry, intact without rashes.  MUSCULOSKELETAL:  Gait is non-antalgic.  No tenderness palpation midline lumbar spine.  No significant tenderness palpation bilateral lower lumbar paraspinous muscles.  Tender to palpation left cervical paraspinous muscles.  Hypertonicity left upper trapezius muscle.  5/5 strength bilateral shoulder abductors, elbow flexors/extensors, wrist extensors, finger flexors/abductors, hip flexors, knee flexors/extensors, ankle dorsi/plantar flexors.  The patient has 5/5 strength for the bilateral hip flexors, knee flexors/extensors, ankle dorsiflexors/plantar flexors.  NEUROLOGICAL:   Sensation to light touch is intact in the bilateral upper extremities throughout and in the bilateral L4, L5, and S1 dermatomes.       RESULTS:    I reviewed the MRI lumbar spine from October 3, 2024.  This shows an acute L3 inferior endplate compression fracture with mild to moderate vertebral body height loss.  There is also a chronic T12 compression fracture.  There is multilevel spondylosis.  At L1-L2 there is mild to moderate spinal  canal stenosis with mild bilateral foraminal stenosis.  At L2-3 there is mild spinal canal stenosis and mild to moderate bilateral foraminal stenosis.  At L3-4 there is mild to moderate central canal stenosis with moderate to severe right and mild to moderate left foraminal stenosis.  At L4-5 there is moderate spinal canal stenosis with moderate right and mild to moderate left foraminal stenosis.  At L5-S1 there is mild to moderate spinal canal stenosis.    X-ray lumbar spine October 14, 2024 shows stable L3 compression fracture and chronic T12 compression fracture.    DEXA scan October 21, 2024 shows osteoporosis.      Again, thank you for allowing me to participate in the care of your patient.        Sincerely,        Delicia Cook PA-C

## 2024-11-09 SDOH — HEALTH STABILITY: PHYSICAL HEALTH: ON AVERAGE, HOW MANY DAYS PER WEEK DO YOU ENGAGE IN MODERATE TO STRENUOUS EXERCISE (LIKE A BRISK WALK)?: 2 DAYS

## 2024-11-09 SDOH — HEALTH STABILITY: PHYSICAL HEALTH: ON AVERAGE, HOW MANY MINUTES DO YOU ENGAGE IN EXERCISE AT THIS LEVEL?: 10 MIN

## 2024-11-09 ASSESSMENT — SOCIAL DETERMINANTS OF HEALTH (SDOH): HOW OFTEN DO YOU GET TOGETHER WITH FRIENDS OR RELATIVES?: TWICE A WEEK

## 2024-11-12 ASSESSMENT — PATIENT HEALTH QUESTIONNAIRE - PHQ9
10. IF YOU CHECKED OFF ANY PROBLEMS, HOW DIFFICULT HAVE THESE PROBLEMS MADE IT FOR YOU TO DO YOUR WORK, TAKE CARE OF THINGS AT HOME, OR GET ALONG WITH OTHER PEOPLE: NOT DIFFICULT AT ALL
SUM OF ALL RESPONSES TO PHQ QUESTIONS 1-9: 1
SUM OF ALL RESPONSES TO PHQ QUESTIONS 1-9: 1

## 2024-11-13 ENCOUNTER — OFFICE VISIT (OUTPATIENT)
Dept: FAMILY MEDICINE | Facility: CLINIC | Age: 74
End: 2024-11-13
Attending: FAMILY MEDICINE
Payer: COMMERCIAL

## 2024-11-13 VITALS
BODY MASS INDEX: 23.05 KG/M2 | OXYGEN SATURATION: 98 % | HEART RATE: 83 BPM | WEIGHT: 135 LBS | SYSTOLIC BLOOD PRESSURE: 130 MMHG | DIASTOLIC BLOOD PRESSURE: 84 MMHG | RESPIRATION RATE: 12 BRPM | HEIGHT: 64 IN | TEMPERATURE: 98.3 F

## 2024-11-13 DIAGNOSIS — Z00.00 WELLNESS EXAMINATION: ICD-10-CM

## 2024-11-13 DIAGNOSIS — Z12.5 SCREENING FOR PROSTATE CANCER: ICD-10-CM

## 2024-11-13 DIAGNOSIS — I10 HYPERTENSION, UNSPECIFIED TYPE: Primary | ICD-10-CM

## 2024-11-13 DIAGNOSIS — R41.3 MEMORY LOSS: ICD-10-CM

## 2024-11-13 DIAGNOSIS — M50.30 DEGENERATIVE CERVICAL DISC: ICD-10-CM

## 2024-11-13 DIAGNOSIS — M54.50 CHRONIC BILATERAL LOW BACK PAIN WITHOUT SCIATICA: ICD-10-CM

## 2024-11-13 DIAGNOSIS — R73.09 OTHER ABNORMAL GLUCOSE: ICD-10-CM

## 2024-11-13 DIAGNOSIS — F39 MOOD DISORDER (H): ICD-10-CM

## 2024-11-13 DIAGNOSIS — G89.29 CHRONIC BILATERAL LOW BACK PAIN WITHOUT SCIATICA: ICD-10-CM

## 2024-11-13 DIAGNOSIS — M81.0 OSTEOPOROSIS, UNSPECIFIED OSTEOPOROSIS TYPE, UNSPECIFIED PATHOLOGICAL FRACTURE PRESENCE: ICD-10-CM

## 2024-11-13 DIAGNOSIS — Z23 NEED FOR VACCINATION: ICD-10-CM

## 2024-11-13 PROBLEM — R53.81 PHYSICAL DECONDITIONING: Status: RESOLVED | Noted: 2024-05-17 | Resolved: 2024-11-13

## 2024-11-13 PROBLEM — K40.90 INGUINAL HERNIA: Status: RESOLVED | Noted: 2023-06-26 | Resolved: 2024-11-13

## 2024-11-13 PROCEDURE — G0439 PPPS, SUBSEQ VISIT: HCPCS | Performed by: FAMILY MEDICINE

## 2024-11-13 PROCEDURE — 99214 OFFICE O/P EST MOD 30 MIN: CPT | Mod: 25 | Performed by: FAMILY MEDICINE

## 2024-11-13 RX ORDER — CALCITONIN SALMON 200 [IU]/.09ML
SPRAY, METERED NASAL
COMMUNITY
Start: 2024-10-10

## 2024-11-13 NOTE — PROGRESS NOTES
Preventive Care Visit  [facility] FLORA Wu MD, Family Medicine  Nov 13, 2024      Assessment & Plan     (I10) Hypertension  (primary encounter diagnosis)  Comment: Currently well-controlled   Plan: Basic metabolic panel, Lipid panel reflex to         direct LDL Fasting             (R41.3) Memory loss  Comment: Several year history of concerned about memory of note the patient was involved in a research study at [facility] not currently  Plan:      (M81.0) Osteoporosis  Comment: New diagnosis patient with an L3 compression fracture  Plan:      (M50.30) Degenerative cervical disc  Comment: Patient also with a known history of cervical disc disease followed by the spine care center  Plan:      (M54.50,  G89.29) Chronic bilateral low back pain without sciatica  Comment: Patient with a recent onset of low back pain conservative management has improved  Plan:      (R73.09) Prediabetes  Comment: Noted previously  Plan: Hemoglobin A1c             (Z12.5) Screening for prostate cancer  Comment: No family history of  Plan: PROSTATE SPEC ANTIGEN SCREEN             (Z00.00) Wellness examination  Comment: Overall the patient has relatively good health habits I did not encourage him to be more physically active.  Plan: PRIMARY CARE FOLLOW-UP SCHEDULING             (Z23) Need for vaccination  Comment:    Plan: INFLUENZA HIGH DOSE, TRIVALENT, PF (FLUZONE),         COVID-19 12+ (PFIZER)             (F39) Mood disorder (H)  Comment: Well-controlled on serotonin  Plan:      PLAN:  1.  Of note during the visit there were technical issues with epic, therefore we will have the patient come back at his convenience for the influenza and COVID vaccines as well as laboratory studies as above.  2.  Patient has an MRI of the cervical spine and will have them follow-up with the spine care center  3.  Patient has an appointment with endocrinology next June  4.  Otherwise continue the patient on his same medication  5.   Patient is going to contact the [facility] again, and if requested will refer to neurology again.  6.  Patient otherwise should be seen yearly            Counseling  Appropriate preventive services were addressed with this patient via screening, questionnaire, or discussion as appropriate for fall prevention, nutrition, physical activity, Tobacco-use cessation, social engagement, weight loss and cognition.  Checklist reviewing preventive services available has been given to the patient.  Reviewed patient's diet, addressing concerns and/or questions.   He is at risk for lack of exercise and has been provided with information to increase physical activity for the benefit of his well-being.   Discussed possible causes of fatigue. The patient reports drinking more than 3 alcoholic drinks per day and/or more than 7 drhnks per week. The patient was counseled and given information about possible harmful effects of excessive alcohol intake.        Inder Glover is a 74 year old, presenting for the following:  Physical (Medicare wellness and Follow up, talk about osteoperiostitis ) and RECHECK    Patient comes in with a number of concerns during his Medicare wellness exam.  The patient developed low back pain this summer seen at the spine care center he does have some lumbar compression fractures which may have been sustained sometime in the past, in either case this is being managed conservatively and his back pain is significantly improved, however he also had a DEXA scan which shows some underlying osteoporosis he does not have an endocrinology visit until next June.    Of note the patient was told that he should limit lifting to 7 pounds, I do not agree with that advice I told the patient that of course he should be mindful of heavy lifting but is not can to break something by lifting he could strain his back, he is aware of proper technique, limiting bending and lifting and twisting is much as possible but I also  want the patient to walk regularly.  He can also do some weights would be good    Patient also has a history of cervical disc disease the spine care center has ordered an MRI of the going to follow-up on this.    Patient has memory loss issues this goes back several years he was in a research study at the [facility], however he has not had follow-up, he is not sure he would want to see neurology in this area he does make lists he is very mindful of this, he is going to reconnect with [place] and see if they will see him again or whether he should see neurology here again.    Patient has been noted to be prediabetic in the past                              11/13/2024    11:25 AM   Additional Questions   Roomed by French CHAUDHRY                Health Care Directive  Patient has a Health Care Directive on file  Advance care planning document is on file and is current.      11/9/2024   General Health   How would you rate your overall physical health? Good   Feel stress (tense, anxious, or unable to sleep) Only a little      (!) STRESS CONCERN      11/9/2024   Nutrition   Diet: Regular (no restrictions)            11/9/2024   Exercise   Days per week of moderate/strenous exercise 2 days   Average minutes spent exercising at this level 10 min      (!) EXERCISE CONCERN      11/9/2024   Social Factors   Frequency of gathering with friends or relatives Twice a week   Worry food won't last until get money to buy more No   Food not last or not have enough money for food? No   Do you have housing? (Housing is defined as stable permanent housing and does not include staying ouside in a car, in a tent, in an abandoned building, in an overnight shelter, or couch-surfing.) Yes   Are you worried about losing your housing? No   Lack of transportation? No   Unable to get utilities (heat,electricity)? No            11/9/2024   Fall Risk   Fallen 2 or more times in the past year? No     No    Trouble with walking or balance? Yes      Yes        Patient-reported    Multiple values from one day are sorted in reverse-chronological order           11/9/2024   Activities of Daily Living- Home Safety   Needs help with the following daily activites None of the above   Safety concerns in the home None of the above            11/9/2024   Dental   Dentist two times every year? Yes            11/9/2024   Hearing Screening   Hearing concerns? None of the above            11/9/2024   Driving Risk Screening   Patient/family members have concerns about driving No            11/9/2024   General Alertness/Fatigue Screening   Have you been more tired than usual lately? (!) YES            11/9/2024   Urinary Incontinence Screening   Bothered by leaking urine in past 6 months No            11/9/2024   TB Screening   Were you born outside of the [place]? No          Today's PHQ-9 Score:       11/12/2024    12:50 PM   PHQ-9 SCORE   PHQ-9 Total Score MyChart 1 (Minimal depression)   PHQ-9 Total Score 1        Patient-reported         11/9/2024   Substance Use   Alcohol more than 3/day or more than 7/wk Yes   How often do you have a drink containing alcohol 4 or more times a week   How many alcohol drinks on typical day 3 or 4   How often do you have 5+ drinks at one occasion Never   Audit 2/3 Score 1   How often not able to stop drinking once started Never   How often failed to do what normally expected Never   How often needed first drink in am after a heavy drinking session Never   How often feeling of guilt or remorse after drinking Never   How often unable to remember what happened the night before Never   Have you or someone else been injured because of your drinking No   Has anyone been concerned or suggested you cut down on drinking No   TOTAL SCORE - AUDIT 5   Do you have a current opioid prescription? No   How severe/bad is pain from 1 to 10? 1/10   Do you use any other substances recreationally? No        Social History     Tobacco Use    Smoking status: Former      Current packs/day: 0.00     Average packs/day: 0.5 packs/day for 5.0 years (2.5 ttl pk-yrs)     Types: Cigars, Cigarettes     Start date: 1958     Quit date: 1963     Years since quittin.5    Smokeless tobacco: Never    Tobacco comments:     smoked a few years in high school   Vaping Use    Vaping status: Never Used   Substance Use Topics    Alcohol use: Yes     Alcohol/week: 7.0 standard drinks of alcohol     Types: 7 Standard drinks or equivalent per week     Comment: Daily    Drug use: No       ASCVD Risk   The 10-year ASCVD risk score (Rafa GALLO, et al., 2019) is: 26.6%    Values used to calculate the score:      Age: 74 years      Sex: Male      Is Non- : No      Diabetic: No      Tobacco smoker: No      Systolic Blood Pressure: 130 mmHg      Is BP treated: Yes      HDL Cholesterol: 82 mg/dL      Total Cholesterol: 194 mg/dL            Reviewed and updated as needed this visit by Provider                    Past Medical History:   Diagnosis Date    Anxiety     Work related. Minor attacks lasting 10-15 seconds. Substantial improvement since longterm. Sense of disconnectedness      Past Surgical History:   Procedure Laterality Date    ARTHROSCOPY SHOULDER ROTATOR CUFF REPAIR Right     INGUINAL HERNIA REPAIR Right     VASECTOMY            Lab work is in process  Labs reviewed in EPIC  BP Readings from Last 3 Encounters:   24 130/84   24 (!) 143/69   10/07/24 136/65    Wt Readings from Last 3 Encounters:   24 61.2 kg (135 lb)   24 62.1 kg (137 lb)   10/07/24 62.1 kg (137 lb)                  Patient Active Problem List   Diagnosis    Degenerative cervical disc    BPH (benign prostatic hyperplasia)    Prediabetes    Actinic keratitis    Hypertension    Memory loss    Mood disorder (H)    Osteoporosis    Low back pain     Past Surgical History:   Procedure Laterality Date    ARTHROSCOPY SHOULDER ROTATOR CUFF REPAIR Right      INGUINAL HERNIA REPAIR Right     VASECTOMY              Social History     Tobacco Use    Smoking status: Former     Current packs/day: 0.00     Average packs/day: 0.5 packs/day for 5.0 years (2.5 ttl pk-yrs)     Types: Cigars, Cigarettes     Start date: 1958     Quit date: 1963     Years since quittin.5    Smokeless tobacco: Never    Tobacco comments:     smoked a few years in high school   Substance Use Topics    Alcohol use: Yes     Alcohol/week: 7.0 standard drinks of alcohol     Types: 7 Standard drinks or equivalent per week     Comment: Daily     Family History   Problem Relation Age of Onset    Ovarian Cancer Mother     Benign prostatic hyperplasia Father     Skin Cancer Father         Not Melanoma    Kidney failure Father     Glaucoma Sister     Glaucoma Sister     Skin Cancer Brother     Glaucoma Brother          Current Outpatient Medications   Medication Sig Dispense Refill    acetaminophen (TYLENOL) 325 MG tablet Take 650 mg by mouth daily as needed for mild pain.      calcitonin, salmon, (MIACALCIN) 200 UNIT/ACT nasal spray       calcitonin, salmon, (MIACALCIN) 200 UNIT/ACT nasal spray Spray 1 spray into one nostril alternating nostrils daily. Alternate nostril each day. 3.7 mL 0    diclofenac (VOLTAREN) 1 % topical gel Apply 4 g topically 4 times daily. 350 g 2    lisinopril (ZESTRIL) 20 MG tablet TAKE ONE TABLET BY MOUTH EVERY DAY 90 tablet 2    loperamide (IMODIUM) 2 MG capsule Take 2 mg by mouth 4 times daily as needed for diarrhea      sertraline (ZOLOFT) 50 MG tablet TAKE ONE TABLET BY MOUTH EVERY DAY 90 tablet 2     No Known Allergies  Current providers sharing in care for this patient include:  Patient Care Team:  Juan Wu MD as PCP - General (Family Practice)  Juan Wu MD as Assigned PCP  Radha Jimenes MD as MD (Neurology)  Debra Aviles Psy.D, LP as Psychologist (Neuropsychology)  Radha Jimenes MD as Assigned  "Neuroscience Provider  Wong Copeland MD as Resident (Student in organized health care education/training program)  Delicia Cook PA-C as Assigned Musculoskeletal Provider    The following health maintenance items are reviewed in Epic and correct as of today:  Health Maintenance   Topic Date Due    ANNUAL REVIEW OF HM ORDERS  07/20/2022    RSV VACCINE (1 - 1-dose 75+ series) Never done    MEDICARE ANNUAL WELLNESS VISIT  08/10/2024    BMP  08/10/2024    INFLUENZA VACCINE (1) 09/01/2024    COVID-19 Vaccine (7 - 2024-25 season) 09/01/2024    FALL RISK ASSESSMENT  11/13/2025    GLUCOSE  08/10/2026    LIPID  08/10/2028    ADVANCE CARE PLANNING  08/10/2028    DTAP/TDAP/TD IMMUNIZATION (3 - Td or Tdap) 07/31/2030    HEPATITIS C SCREENING  Completed    PHQ-2 (once per calendar year)  Completed    Pneumococcal Vaccine: 65+ Years  Completed    ZOSTER IMMUNIZATION  Completed    HPV IMMUNIZATION  Aged Out    MENINGITIS IMMUNIZATION  Aged Out    RSV MONOCLONAL ANTIBODY  Aged Out    COLORECTAL CANCER SCREENING  Discontinued         Review of Systems  Constitutional, HEENT, cardiovascular, pulmonary, GI, , musculoskeletal, neuro, skin, endocrine and psych systems are negative, except as otherwise noted.     Objective    Exam  /84   Pulse 83   Temp 98.3  F (36.8  C) (Oral)   Resp 12   Ht 1.62 m (5' 3.78\")   Wt 61.2 kg (135 lb)   SpO2 98%   BMI 23.33 kg/m     Estimated body mass index is 23.33 kg/m  as calculated from the following:    Height as of this encounter: 1.62 m (5' 3.78\").    Weight as of this encounter: 61.2 kg (135 lb).    Physical Exam  GENERAL: alert and no distress  EYES: Eyes grossly normal to inspection, PERRL and conjunctivae and sclerae normal  HENT: ear canals and TM's normal, nose and mouth without ulcers or lesions  NECK: no adenopathy, no asymmetry, masses, or scars  RESP: lungs clear to auscultation - no rales, rhonchi or wheezes  CV: regular rate and rhythm, normal S1 S2, no S3 or S4, no murmur, " click or rub, no peripheral edema  ABDOMEN: soft, nontender, no hepatosplenomegaly, no masses and bowel sounds normal  MS: no gross musculoskeletal defects noted, no edema  SKIN: no suspicious lesions or rashes  NEURO: Normal strength and tone, mentation intact and speech normal  PSYCH: mentation appears normal, affect normal/bright         No data to display                       Signed Electronically by: Juan Wu MD    Answers submitted by the patient for this visit:  Patient Health Questionnaire (Submitted on 11/12/2024)  If you checked off any problems, how difficult have these problems made it for you to do your work, take care of things at home, or get along with other people?: Not difficult at all  PHQ9 TOTAL SCORE: 1

## 2024-11-14 ENCOUNTER — IMMUNIZATION (OUTPATIENT)
Dept: FAMILY MEDICINE | Facility: CLINIC | Age: 74
End: 2024-11-14
Payer: COMMERCIAL

## 2024-11-14 ENCOUNTER — LAB (OUTPATIENT)
Dept: LAB | Facility: CLINIC | Age: 74
End: 2024-11-14
Payer: COMMERCIAL

## 2024-11-14 DIAGNOSIS — I10 HYPERTENSION, UNSPECIFIED TYPE: ICD-10-CM

## 2024-11-14 DIAGNOSIS — Z12.5 SCREENING FOR PROSTATE CANCER: ICD-10-CM

## 2024-11-14 DIAGNOSIS — R73.09 OTHER ABNORMAL GLUCOSE: ICD-10-CM

## 2024-11-14 LAB
ANION GAP SERPL CALCULATED.3IONS-SCNC: 12 MMOL/L (ref 7–15)
BUN SERPL-MCNC: 22.4 MG/DL (ref 8–23)
CALCIUM SERPL-MCNC: 10.1 MG/DL (ref 8.8–10.4)
CHLORIDE SERPL-SCNC: 102 MMOL/L (ref 98–107)
CHOLEST SERPL-MCNC: 214 MG/DL
CREAT SERPL-MCNC: 0.91 MG/DL (ref 0.67–1.17)
EGFRCR SERPLBLD CKD-EPI 2021: 87 ML/MIN/1.73M2
EST. AVERAGE GLUCOSE BLD GHB EST-MCNC: 105 MG/DL
FASTING STATUS PATIENT QL REPORTED: YES
FASTING STATUS PATIENT QL REPORTED: YES
GLUCOSE SERPL-MCNC: 106 MG/DL (ref 70–99)
HBA1C MFR BLD: 5.3 % (ref 0–5.6)
HCO3 SERPL-SCNC: 27 MMOL/L (ref 22–29)
HDLC SERPL-MCNC: 109 MG/DL
LDLC SERPL CALC-MCNC: 95 MG/DL
NONHDLC SERPL-MCNC: 105 MG/DL
POTASSIUM SERPL-SCNC: 5.6 MMOL/L (ref 3.4–5.3)
PSA SERPL DL<=0.01 NG/ML-MCNC: 2.91 NG/ML (ref 0–6.5)
SODIUM SERPL-SCNC: 141 MMOL/L (ref 135–145)
TRIGL SERPL-MCNC: 50 MG/DL

## 2024-11-14 PROCEDURE — 90480 ADMN SARSCOV2 VAC 1/ONLY CMP: CPT

## 2024-11-14 PROCEDURE — 90662 IIV NO PRSV INCREASED AG IM: CPT

## 2024-11-14 PROCEDURE — 83036 HEMOGLOBIN GLYCOSYLATED A1C: CPT

## 2024-11-14 PROCEDURE — 36415 COLL VENOUS BLD VENIPUNCTURE: CPT

## 2024-11-14 PROCEDURE — 91320 SARSCV2 VAC 30MCG TRS-SUC IM: CPT

## 2024-11-14 PROCEDURE — G0103 PSA SCREENING: HCPCS

## 2024-11-14 PROCEDURE — 80048 BASIC METABOLIC PNL TOTAL CA: CPT

## 2024-11-14 PROCEDURE — 80061 LIPID PANEL: CPT

## 2024-11-14 PROCEDURE — G0008 ADMIN INFLUENZA VIRUS VAC: HCPCS

## 2024-11-18 DIAGNOSIS — I10 HYPERTENSION, UNSPECIFIED TYPE: ICD-10-CM

## 2024-11-18 RX ORDER — LISINOPRIL 20 MG/1
TABLET ORAL
Qty: 90 TABLET | Refills: 3 | Status: SHIPPED | OUTPATIENT
Start: 2024-11-18

## 2024-11-19 ENCOUNTER — HOSPITAL ENCOUNTER (OUTPATIENT)
Dept: MRI IMAGING | Facility: CLINIC | Age: 74
Discharge: HOME | End: 2024-11-19
Attending: PHYSICIAN ASSISTANT
Payer: COMMERCIAL

## 2024-11-19 DIAGNOSIS — M54.2 CERVICALGIA: ICD-10-CM

## 2024-11-19 PROCEDURE — 72141 MRI NECK SPINE W/O DYE: CPT

## 2024-11-21 ENCOUNTER — TELEPHONE (OUTPATIENT)
Dept: PHYSICAL MEDICINE AND REHAB | Facility: CLINIC | Age: 74
End: 2024-11-21
Payer: COMMERCIAL

## 2024-11-21 DIAGNOSIS — M54.2 CERVICALGIA: Primary | ICD-10-CM

## 2024-11-21 NOTE — TELEPHONE ENCOUNTER
Phone call to patient to review results and provider's recommendations. Results given and explained. Discussed PSP wanting him to start PT for his neck. Stated understanding.     Also discussed returning for a follow up in a couple of weeks (after repeat lumbar x-rays are done) to see PSP. Transferred to scheduling to make appointment.

## 2024-11-21 NOTE — TELEPHONE ENCOUNTER
----- Message from Delicia Cook sent at 11/20/2024  3:48 PM CST -----  Please call patient and let him know that the MRI of his neck shows a disc bulge/bone spur at C4/5 causing moderate narrowing around the spinal cord.  There is also arthritis in the joints at this level causing severe narrowing around the nerves as they exit out of the spine.  I would like the patient to start physical therapy for his neck.  Please let me know once you have spoken with the patient and I will enter the referral.  Patient also needs to schedule a follow up visit with me approximately 12/4/24 for follow up related to his lumbar compression fracture.  Thanks!

## 2024-12-02 ENCOUNTER — HOSPITAL ENCOUNTER (OUTPATIENT)
Dept: RADIOLOGY | Facility: CLINIC | Age: 74
Discharge: HOME | End: 2024-12-02
Attending: PHYSICIAN ASSISTANT | Admitting: PHYSICIAN ASSISTANT
Payer: COMMERCIAL

## 2024-12-02 DIAGNOSIS — S32.030D CLOSED COMPRESSION FRACTURE OF L3 LUMBAR VERTEBRA, WITH ROUTINE HEALING, SUBSEQUENT ENCOUNTER: ICD-10-CM

## 2024-12-02 PROCEDURE — 72100 X-RAY EXAM L-S SPINE 2/3 VWS: CPT

## 2024-12-02 NOTE — PROGRESS NOTES
Assessment:   Nate North is a 74 year old y.o. male with past medical history significant for hypertension, BPH, depression, prediabetes, memory loss  who presents today for follow-up regarding 2 concerns:  1.  Right low back/upper buttock pain.  My review of an MRI lumbar spine October 3, 2024 shows an acute L3 inferior endplate compression fracture with mild to moderate vertebral body height loss.  There is no retropulsion.  There is also multilevel degenerative changes with up to moderate spinal canal stenosis at L4-5 and moderate to severe right L3-4 foraminal stenosis.  Fracture pain presumably occurred in early September when he fell from a ladder.  Follow-up x-rays are stable.  Low back pain has resolved.  2.  Left greater than right neck pain.  My review of an MRI cervical spine shows a disc osteophyte complex at C5-6 contributing to moderate spinal canal stenosis and severe bilateral foraminal stenosis.  There is also facet arthropathy.  Pain is most consistent with cervical facet arthropathy with secondary myofascial pain.  He does not have any radicular symptoms.  He is neurologically intact.       Plan:     A shared decision making plan was used.  The patient's values and choices were respected.  The following represents what was discussed and decided upon by the physician assistant and the patient.      1.  DIAGNOSTIC TESTS:  - I reviewed the MRI lumbar spine.  - I reviewed the follow-up AP and lateral lumbar spine x-rays December 2, 2024.  No additional follow-up x-rays are needed.  Fractures have been stable x 3 months.  - I reviewed the MRI cervical spine.  - DEXA scan showed osteoporosis.      2.  PHYSICAL THERAPY: I entered a referral to physical therapy for neck pain.    3.  MEDICATIONS:  - Tizanidine 2 to 4 mg at bedtime was prescribed.  He has difficult time getting comfortable and staying asleep because of his neck pain.  He is significant hypertonicity in the left greater than right  cervical paraspinous muscles.  - Patient can continue Tylenol as needed.      4.  INTERVENTIONS: No interventions were ordered.  Patient will trial conservative treatments first.  If neck pain fails to improve we could consider cervical medial branch blocks as a workup toward radiofrequency ablation.  - If he fails medial branch blocks we could consider a trigger point injection.    5.  REFERRALS:  - Patient is scheduled to see endocrinology June 2025.    6.  PATIENT EDUCATION: Patient can gradually wean out of his LSO brace.  He can gradually increase activity as tolerated.    7.  FOLLOW-UP: Patient will follow-up with me in 2 months to monitor progress with physical therapy.  If he has questions or concerns in the meantime, he should not hesitate to call.    Subjective:     Nate North is a 74 year old male who presents today for follow-up regarding low back pain related to an L3 compression fracture which presumably occurred in early September when he fell from a ladder.  He also has chronic neck pain.  Patient reports his low back pain has resolved.  Unfortunately, he continues to have significant neck pain.    Patient complains of left greater than right neck pain.  Pain is in the mid cervical region.  He denies any pain radiating into the shoulders or down the arms.  He has restriction with range of motion which makes it difficult when he is driving.  He has difficult time sleeping because his neck is very uncomfortable and he has a difficult time finding a comfortable position.  He wakes from the neck pain during the night.  He rates his pain today as a 3 out of 10.  At its worst it is a 3 out of 10.  At its best it is a 1 out of 10.  Pain is aggravated with turning his neck and trying to sleep.  He denies alleviating factors to the pain.  Denies new symptoms since he was last seen.    Treatment to date:  - No physical therapy  - No chiropractic treatment  - No spine injections  - No spine surgeries  -  Tylenol 650 mg at bedtime  - Aspirin    Review of Systems:  Positive for headache, pain much worse at night, difficulty with hand skills.  Negative for numbness/tingling, weakness, loss of bowel/bladder control, inability to urinate, trip//falls, difficulty swallowing, fevers, unintentional weight loss.     Objective:   CONSTITUTIONAL:  Vital signs as above.  No acute distress.  The patient is well nourished and well groomed.    PSYCHIATRIC:  The patient is awake, alert, oriented to person, place and time.  The patient is answering questions appropriately with clear speech.  Normal affect.  HEENT: Normocephalic, atraumatic.  Sclera clear.    SKIN: Exposed skin is clean, dry, intact without rashes.  MUSCULOSKELETAL:  Gait is non-antalgic.  Tender to palpation left cervical paraspinous muscles.  Hypertonicity left greater than right cervical paraspinous muscles.  5/5 strength bilateral shoulder abductors, elbow flexors/extensors, wrist extensors, finger flexors/abductors.  NEUROLOGICAL:   Sensation to light touch is intact in the bilateral upper extremities throughout.  Negative Abe sign bilaterally.     RESULTS:    I reviewed the MRI lumbar spine from October 3, 2024.  This shows an acute L3 inferior endplate compression fracture with mild to moderate vertebral body height loss.  There is also a chronic T12 compression fracture.  There is multilevel spondylosis.  At L1-L2 there is mild to moderate spinal canal stenosis with mild bilateral foraminal stenosis.  At L2-3 there is mild spinal canal stenosis and mild to moderate bilateral foraminal stenosis.  At L3-4 there is mild to moderate central canal stenosis with moderate to severe right and mild to moderate left foraminal stenosis.  At L4-5 there is moderate spinal canal stenosis with moderate right and mild to moderate left foraminal stenosis.  At L5-S1 there is mild to moderate spinal canal stenosis.    I reviewed the MRI cervical spine dated November 19,  2024.  At C5-6 there is a disc osteophyte complex with moderate spinal canal stenosis and severe bilateral foraminal stenosis.    X-ray lumbar spine December 2, 2024 shows unchanged compression deformities at T12 and L3.  No new fracture.    DEXA scan October 21, 2024 shows osteoporosis.

## 2024-12-05 ENCOUNTER — OFFICE VISIT (OUTPATIENT)
Dept: PHYSICAL MEDICINE AND REHAB | Facility: CLINIC | Age: 74
End: 2024-12-05
Payer: COMMERCIAL

## 2024-12-05 VITALS
SYSTOLIC BLOOD PRESSURE: 125 MMHG | HEART RATE: 77 BPM | WEIGHT: 135 LBS | BODY MASS INDEX: 23.05 KG/M2 | DIASTOLIC BLOOD PRESSURE: 64 MMHG | HEIGHT: 64 IN

## 2024-12-05 DIAGNOSIS — S32.030D CLOSED COMPRESSION FRACTURE OF L3 LUMBAR VERTEBRA, WITH ROUTINE HEALING, SUBSEQUENT ENCOUNTER: ICD-10-CM

## 2024-12-05 DIAGNOSIS — M79.18 MYOFASCIAL PAIN: ICD-10-CM

## 2024-12-05 DIAGNOSIS — M47.812 ARTHROPATHY OF CERVICAL FACET JOINT: ICD-10-CM

## 2024-12-05 DIAGNOSIS — M54.2 CERVICALGIA: Primary | ICD-10-CM

## 2024-12-05 RX ORDER — TIZANIDINE 2 MG/1
2-4 TABLET ORAL
Qty: 60 TABLET | Refills: 1 | Status: SHIPPED | OUTPATIENT
Start: 2024-12-05

## 2024-12-05 ASSESSMENT — PAIN SCALES - GENERAL: PAINLEVEL_OUTOF10: MILD PAIN (3)

## 2024-12-05 NOTE — PATIENT INSTRUCTIONS
An order for physicaltherapy has been provided today.  Someone will call you to schedule physical therapy or you can call [phone] to schedule physical therapy.  It will be very important for you to do your physical therapy exercises on aregular basis to decrease your pain and prevent future flares of pain.

## 2024-12-05 NOTE — LETTER
12/5/2024      Nate Norht  [address] [facility] [place] [address]      Dear Colleague,    Thank you for referring your patient, Nate North, to the [facility]ERY. Please see a copy of my visit note below.    Assessment:   Nate North is a 74 year old y.o. male with past medical history significant for hypertension, BPH, depression, prediabetes, memory loss  who presents today for follow-up regarding 2 concerns:  1.  Right low back/upper buttock pain.  My review of an MRI lumbar spine October 3, 2024 shows an acute L3 inferior endplate compression fracture with mild to moderate vertebral body height loss.  There is no retropulsion.  There is also multilevel degenerative changes with up to moderate spinal canal stenosis at L4-5 and moderate to severe right L3-4 foraminal stenosis.  Fracture pain presumably occurred in early September when he fell from a ladder.  Follow-up x-rays are stable.  Low back pain has resolved.  2.  Left greater than right neck pain.  My review of an MRI cervical spine shows a disc osteophyte complex at C5-6 contributing to moderate spinal canal stenosis and severe bilateral foraminal stenosis.  There is also facet arthropathy.  Pain is most consistent with cervical facet arthropathy with secondary myofascial pain.  He does not have any radicular symptoms.  He is neurologically intact.       Plan:     A shared decision making plan was used.  The patient's values and choices were respected.  The following represents what was discussed and decided upon by the physician assistant and the patient.      1.  DIAGNOSTIC TESTS:  - I reviewed the MRI lumbar spine.  - I reviewed the follow-up AP and lateral lumbar spine x-rays December 2, 2024.  No additional follow-up x-rays are needed.  Fractures have been stable x 3 months.  - I reviewed the MRI cervical spine.  - DEXA scan showed osteoporosis.      2.  PHYSICAL THERAPY: I entered a referral to physical  therapy for neck pain.    3.  MEDICATIONS:  - Tizanidine 2 to 4 mg at bedtime was prescribed.  He has difficult time getting comfortable and staying asleep because of his neck pain.  He is significant hypertonicity in the left greater than right cervical paraspinous muscles.  - Patient can continue Tylenol as needed.      4.  INTERVENTIONS: No interventions were ordered.  Patient will trial conservative treatments first.  If neck pain fails to improve we could consider cervical medial branch blocks as a workup toward radiofrequency ablation.  - If he fails medial branch blocks we could consider a trigger point injection.    5.  REFERRALS:  - Patient is scheduled to see endocrinology June 2025.    6.  PATIENT EDUCATION: Patient can gradually wean out of his LSO brace.  He can gradually increase activity as tolerated.    7.  FOLLOW-UP: Patient will follow-up with me in 2 months to monitor progress with physical therapy.  If he has questions or concerns in the meantime, he should not hesitate to call.    Subjective:     Nate North is a 74 year old male who presents today for follow-up regarding low back pain related to an L3 compression fracture which presumably occurred in early September when he fell from a ladder.  He also has chronic neck pain.  Patient reports his low back pain has resolved.  Unfortunately, he continues to have significant neck pain.    Patient complains of left greater than right neck pain.  Pain is in the mid cervical region.  He denies any pain radiating into the shoulders or down the arms.  He has restriction with range of motion which makes it difficult when he is driving.  He has difficult time sleeping because his neck is very uncomfortable and he has a difficult time finding a comfortable position.  He wakes from the neck pain during the night.  He rates his pain today as a 3 out of 10.  At its worst it is a 3 out of 10.  At its best it is a 1 out of 10.  Pain is aggravated with  turning his neck and trying to sleep.  He denies alleviating factors to the pain.  Denies new symptoms since he was last seen.    Treatment to date:  - No physical therapy  - No chiropractic treatment  - No spine injections  - No spine surgeries  - Tylenol 650 mg at bedtime  - Aspirin    Review of Systems:  Positive for headache, pain much worse at night, difficulty with hand skills.  Negative for numbness/tingling, weakness, loss of bowel/bladder control, inability to urinate, trip//falls, difficulty swallowing, fevers, unintentional weight loss.     Objective:   CONSTITUTIONAL:  Vital signs as above.  No acute distress.  The patient is well nourished and well groomed.    PSYCHIATRIC:  The patient is awake, alert, oriented to person, place and time.  The patient is answering questions appropriately with clear speech.  Normal affect.  HEENT: Normocephalic, atraumatic.  Sclera clear.    SKIN: Exposed skin is clean, dry, intact without rashes.  MUSCULOSKELETAL:  Gait is non-antalgic.  Tender to palpation left cervical paraspinous muscles.  Hypertonicity left greater than right cervical paraspinous muscles.  5/5 strength bilateral shoulder abductors, elbow flexors/extensors, wrist extensors, finger flexors/abductors.  NEUROLOGICAL:   Sensation to light touch is intact in the bilateral upper extremities throughout.  Negative Bae sign bilaterally.     RESULTS:    I reviewed the MRI lumbar spine from October 3, 2024.  This shows an acute L3 inferior endplate compression fracture with mild to moderate vertebral body height loss.  There is also a chronic T12 compression fracture.  There is multilevel spondylosis.  At L1-L2 there is mild to moderate spinal canal stenosis with mild bilateral foraminal stenosis.  At L2-3 there is mild spinal canal stenosis and mild to moderate bilateral foraminal stenosis.  At L3-4 there is mild to moderate central canal stenosis with moderate to severe right and mild to moderate left  foraminal stenosis.  At L4-5 there is moderate spinal canal stenosis with moderate right and mild to moderate left foraminal stenosis.  At L5-S1 there is mild to moderate spinal canal stenosis.    I reviewed the MRI cervical spine dated November 19, 2024.  At C5-6 there is a disc osteophyte complex with moderate spinal canal stenosis and severe bilateral foraminal stenosis.    X-ray lumbar spine December 2, 2024 shows unchanged compression deformities at T12 and L3.  No new fracture.    DEXA scan October 21, 2024 shows osteoporosis.      Again, thank you for allowing me to participate in the care of your patient.        Sincerely,        Delicia Cook PA-C

## 2024-12-18 NOTE — PROGRESS NOTES
[facility] Dermatology Note  Encounter Date: Dec 19, 2024  Office Visit     Dermatology Problem List:  1. NUB, left forehead, s/p shave bx 12/19/24; favoring hAK, rule out SCCis  ____________________________________________    Assessment & Plan:   # NUB, left forehead, s/p shave bx 12/19/24  Favoring hAK, rule out SCCis given refractory nature of freezing by PCP several times over several years. In shared decision making opted to biopsy to confirm the diagnosis today.     # Benign findings including melanocytic nevi, cherry angiomas and seborrheic keratoses   - Reviewed benign etiology and natural course.  - ABCDEs: Counseled ABCDEs of melanoma: Asymmetry, Border (irregularity), Color (not uniform, changes in color), Diameter (greater than 6 mm which is about the size of a pencil eraser), and Evolving (any changes in preexisting moles).  - Sun protection: Counseled SPF30+ sunscreen, UPF clothing, sun avoidance, tanning bed avoidance.    Procedures Performed:   - Shave biopsy procedure note, location(s): left forehead. After discussion of benefits and risks including but not limited to bleeding, infection, scar, incomplete removal, recurrence, and non-diagnostic biopsy, verbal consent and photographs were obtained. The area was cleaned with isopropyl alcohol. 0.5mL of 1% lidocaine with epinephrine was injected to obtain adequate anesthesia of lesion(s). Shave biopsy at site(s) performed. Hemostasis was achieved with aluminium chloride. Petrolatum ointment and a sterile dressing were applied. The patient tolerated the procedure and no complications were noted. The patient was provided with verbal and written post care instructions.     None    Follow-up: pending pathology, prn for new or changing lesions    Staff and Resident:     Staffed with Dr. Medellin.     Wong Copeland MD  Dermatology Resident  I was present for key portions of the procedure. Bianca Medellin MD   I, Bianca Medellin MD, saw  this patient with the resident and agree with the resident s findings and plan of care as documented in the resident s note.    ____________________________________________    CC: Derm Problem (Adalberto is here today for a lesion of concern on the left forehead. It will scab and fall off. Has been treated with LN2 twice. )    HPI:  Mr. Nate North is a(n) 74 year old male who presents today as a new patient for a spot on the right forehead. Has been frozen several times before. Does not bleed, does drain a little bit of pus. Not painful, but itches. No other lesions of concern today. Patient is otherwise feeling well, without additional skin concerns. No personal history of skin cancer, unsure on his family history of skin cancer. Mainly stays out of the sun for sun protection.     Labs Reviewed:  N/A    Physical Exam:  Vitals: There were no vitals taken for this visit.  SKIN: Waist-up skin, which includes the head/face, neck, both arms, chest, back, abdomen, digits and/or nails was examined.  - left forehead hyperkeratotic papule    - There are dome shaped bright red papules on the trunk and extremities.   - Multiple regular brown pigmented macules and papules are identified on the trunk and extremities.   - Scattered brown macules on sun exposed areas.  - There are waxy stuck on tan to brown papules on the trunk and extremities.   - No other lesions of concern on areas examined.     Medications:  Current Outpatient Medications   Medication Sig Dispense Refill    acetaminophen (TYLENOL) 325 MG tablet Take 650 mg by mouth daily as needed for mild pain.      diclofenac (VOLTAREN) 1 % topical gel Apply 4 g topically 4 times daily. 350 g 2    lisinopril (ZESTRIL) 20 MG tablet TAKE ONE TABLET BY MOUTH EVERY DAY 90 tablet 3    loperamide (IMODIUM) 2 MG capsule Take 2 mg by mouth 4 times daily as needed for diarrhea      sertraline (ZOLOFT) 50 MG tablet TAKE ONE TABLET BY MOUTH EVERY DAY 90 tablet 2    tiZANidine  (ZANAFLEX) 2 MG tablet Take 1-2 tablets (2-4 mg) by mouth nightly as needed for muscle spasms. 60 tablet 1     No current facility-administered medications for this visit.      Past Medical History:   Patient Active Problem List   Diagnosis    Degenerative cervical disc    BPH (benign prostatic hyperplasia)    Prediabetes    Actinic keratitis    Hypertension    Memory loss    Mood disorder (H)    Osteoporosis    Low back pain     Past Medical History:   Diagnosis Date    Anxiety     Work related. Minor attacks lasting 10-15 seconds. Substantial improvement since [facility]. Sense of disconnectedness        CC Juan Wu MD  [address] [place] [address] on close of this encounter.

## 2024-12-19 ENCOUNTER — OFFICE VISIT (OUTPATIENT)
Dept: DERMATOLOGY | Facility: CLINIC | Age: 74
End: 2024-12-19
Attending: FAMILY MEDICINE
Payer: COMMERCIAL

## 2024-12-19 DIAGNOSIS — L98.9 SKIN LESION: ICD-10-CM

## 2024-12-19 DIAGNOSIS — D48.5 NEOPLASM OF UNCERTAIN BEHAVIOR OF SKIN: Primary | ICD-10-CM

## 2024-12-19 DIAGNOSIS — L82.1 SEBORRHEIC KERATOSIS: ICD-10-CM

## 2024-12-19 PROCEDURE — 88305 TISSUE EXAM BY PATHOLOGIST: CPT | Mod: 26 | Performed by: DERMATOLOGY

## 2024-12-19 PROCEDURE — 88305 TISSUE EXAM BY PATHOLOGIST: CPT | Mod: TC | Performed by: DERMATOLOGY

## 2024-12-19 RX ORDER — LIDOCAINE HYDROCHLORIDE AND EPINEPHRINE 10; 10 MG/ML; UG/ML
3 INJECTION, SOLUTION INFILTRATION; PERINEURAL ONCE
Status: ACTIVE | OUTPATIENT
Start: 2024-12-19

## 2024-12-19 ASSESSMENT — PAIN SCALES - GENERAL: PAINLEVEL_OUTOF10: NO PAIN (0)

## 2024-12-19 NOTE — LETTER
12/19/2024       RE: Nate North  [address] [facility] [place] [address]     Dear Colleague,    Thank you for referring your patient, Nate North, to the [facility] DERMATOLOGY CLINIC [place] at [facility]. Please see a copy of my visit note below.    [facility] Dermatology Note  Encounter Date: Dec 19, 2024  Office Visit     Dermatology Problem List:  1. NUB, left forehead, s/p shave bx 12/19/24; favoring hAK, rule out SCCis  ____________________________________________    Assessment & Plan:   # NUB, left forehead, s/p shave bx 12/19/24  Favoring hAK, rule out SCCis given refractory nature of freezing by PCP several times over several years. In shared decision making opted to biopsy to confirm the diagnosis today.     # Benign findings including melanocytic nevi, cherry angiomas and seborrheic keratoses   - Reviewed benign etiology and natural course.  - ABCDEs: Counseled ABCDEs of melanoma: Asymmetry, Border (irregularity), Color (not uniform, changes in color), Diameter (greater than 6 mm which is about the size of a pencil eraser), and Evolving (any changes in preexisting moles).  - Sun protection: Counseled SPF30+ sunscreen, UPF clothing, sun avoidance, tanning bed avoidance.    Procedures Performed:   - Shave biopsy procedure note, location(s): left forehead. After discussion of benefits and risks including but not limited to bleeding, infection, scar, incomplete removal, recurrence, and non-diagnostic biopsy, verbal consent and photographs were obtained. The area was cleaned with isopropyl alcohol. 0.5mL of 1% lidocaine with epinephrine was injected to obtain adequate anesthesia of lesion(s). Shave biopsy at site(s) performed. Hemostasis was achieved with aluminium chloride. Petrolatum ointment and a sterile dressing were applied. The patient tolerated the procedure and no complications were noted. The patient was provided  with verbal and written post care instructions.     None    Follow-up: pending pathology, prn for new or changing lesions    Staff and Resident:     Staffed with Dr. Medellin.     Wong Copeland MD  Dermatology Resident  I was present for key portions of the procedure. Bianca Medellin MD   I, Bianca Medellin MD, saw this patient with the resident and agree with the resident s findings and plan of care as documented in the resident s note.    ____________________________________________    CC: Derm Problem (Adalberto is here today for a lesion of concern on the left forehead. It will scab and fall off. Has been treated with LN2 twice. )    HPI:  Mr. Nate North is a(n) 74 year old male who presents today as a new patient for a spot on the right forehead. Has been frozen several times before. Does not bleed, does drain a little bit of pus. Not painful, but itches. No other lesions of concern today. Patient is otherwise feeling well, without additional skin concerns. No personal history of skin cancer, unsure on his family history of skin cancer. Mainly stays out of the sun for sun protection.     Labs Reviewed:  N/A    Physical Exam:  Vitals: There were no vitals taken for this visit.  SKIN: Waist-up skin, which includes the head/face, neck, both arms, chest, back, abdomen, digits and/or nails was examined.  - left forehead hyperkeratotic papule    - There are dome shaped bright red papules on the trunk and extremities.   - Multiple regular brown pigmented macules and papules are identified on the trunk and extremities.   - Scattered brown macules on sun exposed areas.  - There are waxy stuck on tan to brown papules on the trunk and extremities.   - No other lesions of concern on areas examined.     Medications:  Current Outpatient Medications   Medication Sig Dispense Refill     acetaminophen (TYLENOL) 325 MG tablet Take 650 mg by mouth daily as needed for mild pain.       diclofenac (VOLTAREN) 1 % topical gel Apply  4 g topically 4 times daily. 350 g 2     lisinopril (ZESTRIL) 20 MG tablet TAKE ONE TABLET BY MOUTH EVERY DAY 90 tablet 3     loperamide (IMODIUM) 2 MG capsule Take 2 mg by mouth 4 times daily as needed for diarrhea       sertraline (ZOLOFT) 50 MG tablet TAKE ONE TABLET BY MOUTH EVERY DAY 90 tablet 2     tiZANidine (ZANAFLEX) 2 MG tablet Take 1-2 tablets (2-4 mg) by mouth nightly as needed for muscle spasms. 60 tablet 1     No current facility-administered medications for this visit.      Past Medical History:   Patient Active Problem List   Diagnosis     Degenerative cervical disc     BPH (benign prostatic hyperplasia)     Prediabetes     Actinic keratitis     Hypertension     Memory loss     Mood disorder (H)     Osteoporosis     Low back pain     Past Medical History:   Diagnosis Date     Anxiety     Work related. Minor attacks lasting 10-15 seconds. Substantial improvement since [facility]. Sense of disconnectedness        CC Juan Wu MD  [address] [place] [address] on close of this encounter.      Again, thank you for allowing me to participate in the care of your patient.      Sincerely,    Wong Copeland MD

## 2024-12-19 NOTE — NURSING NOTE
Lidocaine-epinephrine 1-1:090140 % injection   1mL once for one use, starting 12/19/2024 ending 12/19/2024,  2mL disp, R-0, injection  Injected by Dr. Copeland

## 2024-12-19 NOTE — NURSING NOTE
Dermatology Rooming Note    Nate North's goals for this visit include:   Chief Complaint   Patient presents with    Derm Problem     Adalberto is here today for a lesion of concern on the left forehead. It will scab and fall off.      Mandi ERNANDEZ CMA

## 2024-12-19 NOTE — PATIENT INSTRUCTIONS
Wound Care After a Biopsy    What is a skin biopsy?  A skin biopsy allows the doctor to examine a very small piece of tissue under the microscope to determine the diagnosis and the best treatment for the skin condition. A local anesthetic (numbing medicine) is injected with a very small needle into the skin area to be tested. A small piece of skin is taken from the area. Sometimes a suture (stitch) is used.     What are the risks of a skin biopsy?  I will experience scar, bleeding, swelling, pain, crusting and redness. I may experience incomplete removal or recurrence. Risks of this procedure are excessive bleeding, bruising, infection, nerve damage, numbness, thick (hypertrophic or keloidal) scar and non-diagnostic biopsy.    How should I care for my wound for the first 24 hours?  Keep the wound dry and covered for 24 hours  If it bleeds, hold direct pressure on the area for 15 minutes. If bleeding does not stop, call us or go to the emergency room  Avoid strenuous exercise the first 1-2 days or as your doctor instructs you    How should I care for the wound after 24 hours?  After 24 hours, remove the bandage  You may bathe or shower as normal  If you had a scalp biopsy, you can shampoo as usual and can use shower water to clean the biopsy site daily  Clean the wound once a day with gentle soap and water  Do not scrub, be gentle  Apply white petroleum/Vaseline after cleaning the wound with a cotton swab or a clean finger, and keep the site covered with a Bandaid /bandage. Bandages are not necessary with a scalp biopsy  If you are unable to cover the site with a Bandaid /bandage, re-apply ointment 2-3 times a day to keep the site moist. Moisture will help with healing  Avoid strenuous activity for first 1-2 days  Avoid lakes, rivers, pools, and oceans until the stitches are removed or the site is healed    How do I clean my wound?  Wash hands thoroughly with soap or use hand  before all wound care  Clean  the wound with gentle soap and water  Apply white petroleum/Vaseline  to wound after it is clean  Replace the Bandaid /bandage to keep the wound covered for the first few days or as instructed by your doctor  If you had a scalp biopsy, warm shower water to the area on a daily basis should suffice    What should I use to clean my wound?   Cotton-tipped applicators (Qtips )  White petroleum jelly (Vaseline ). Use a clean new container and use Q-tips to apply.  Bandaids  as needed  Gentle soap     How should I care for my wound long term?  Do not get your wound dirty  Keep up with wound care for one week or until the area is healed.  If you have stitches, stitches need to be removed in 14 days. You may return to our clinic for this or you may have it done locally at your doctor s office.  A small scab will form and fall off by itself when the area is completely healed. The area will be red and will become pink in color as it heals. Sun protection is very important for how your scar will turn out. Sunscreen with an SPF 30 or greater is recommended once the area is healed.  You should have some soreness but it should be mild and slowly go away over several days. Talk to your doctor about using tylenol for pain,    When should I call my doctor?  If you have increased:   Pain or swelling  Pus or drainage (clear or slightly yellow drainage is ok)  Temperature over 100F  Spreading redness or warmth around wound    When will I hear about my results?  The biopsy results can take 2 weeks to come back.  Your results will automatically release to [facility] before your provider has even reviewed them.  The clinic will call you with the results, send you a [facility] message, or have you schedule a follow-up clinic or phone time to discuss the results.  Contact our clinics if you do not hear from us in 2 weeks.    Who should I call with questions?  [facility]: [phone]  [facility]  Replaced by [facility]: [phone]  For urgent needs outside of business hours call the [facility] at [phone] and ask for the dermatology resident on call       Checking for Skin Cancer  You can help find cancer early by checking your skin each month. There are 3 main kinds of skin cancer: melanoma, basal cell carcinoma, and squamous cell carcinoma. Doing monthly skin checks is the best way to find new marks, sores, or skin changes. Follow these instructions for checking your skin.   The ABCDEs of checking moles for melanoma   Check your moles or growths for signs of melanoma using ABCDE:   Asymmetry: The sides of the mole or growth don t match.  Border: The edges are ragged, notched, or blurred.  Color: The color within the mole or growth varies. It could be black, brown, tan, white, or shades of red, gray, or blue.  Diameter: The mole or growth is larger than   inch or 6 mm (size of a pencil eraser).  Evolving: The size, shape, texture, or color of the mole or growth is changing.     ABCDE's of moles on light skin.        ABCDE's of moles on dark skin may be harder to identify.     Checking for other types of skin cancer  Basal cell carcinoma or squamous cell carcinoma cause symptoms like:     A spot or mole that looks different from all other marks on your skin  Changes in how an area feels, such as itching, tenderness, or pain  Changes in the skin's surface, such as oozing, bleeding, or scaliness  A sore that doesn't heal  New swelling, redness, or spread of color beyond the border of a mole    Who s at risk?  Anyone of any skin color can get skin cancer. But you're at greater risk if you have:   Fair skin that freckles easily and burns instead of tanning  Light-colored or red hair  Light-colored eyes  Many moles or abnormal moles on your skin  A long history of unprotected exposure to sunlight or tanning beds  A history of many blistering sunburns as a child or teen  A family history of skin  cancer  Been exposed to radiation or chemicals  A weakened immune system  Been exposed to arsenic  If you've had skin cancer in the past, you're at high risk of having it again.   How to check your skin  Do your monthly skin checkups in front of a full-length mirror. Use a room with good lighting so it's easier to see. Use a hand mirror to look at hard-to-see places like your buttocks and back. You can also have a trusted friend or family member help you with these checks. Check every part of your body, including your:   Head (ears, face, neck, and scalp)  Torso (front, back, sides, and under breasts)  Arms (tops, undersides, and armpits)  Hands (palms, backs, and fingers, including under the nails)  Lower back, buttocks, and genitals  Legs (front, back, and sides)  Feet (tops, soles, toes, including under the nails, and between toes)  Watch for new spots on your skin or a spot that's changing in color, shape, size.   If you have a lot of moles, take digital photos of them each month. Make sure to take photos both up close and from a distance. These can help you see if any moles change over time.   Know your skin  Most skin changes aren't cancer. But if you see any changes in your skin, call your healthcare provider right away. Only they can tell you if a change is a problem. If you have skin cancer, seeing your provider can be the first step to getting the treatment that could save your life.   [employer] last reviewed this educational content on 10/1/2021    4239-1076 [employer]. All rights reserved. This information is not intended as a substitute for professional medical care. Always follow your healthcare professional's instructions.

## 2024-12-23 ENCOUNTER — THERAPY VISIT (OUTPATIENT)
Dept: PHYSICAL THERAPY | Facility: REHABILITATION | Age: 74
End: 2024-12-23
Attending: PHYSICIAN ASSISTANT
Payer: COMMERCIAL

## 2024-12-23 DIAGNOSIS — M79.18 MYOFASCIAL PAIN: ICD-10-CM

## 2024-12-23 DIAGNOSIS — M54.2 CERVICALGIA: ICD-10-CM

## 2024-12-23 DIAGNOSIS — M54.2 NECK PAIN: Primary | ICD-10-CM

## 2024-12-23 DIAGNOSIS — M47.812 ARTHROPATHY OF CERVICAL FACET JOINT: ICD-10-CM

## 2024-12-23 PROCEDURE — 97161 PT EVAL LOW COMPLEX 20 MIN: CPT | Mod: GP | Performed by: PHYSICAL THERAPIST

## 2024-12-23 PROCEDURE — 97112 NEUROMUSCULAR REEDUCATION: CPT | Mod: GP | Performed by: PHYSICAL THERAPIST

## 2024-12-23 PROCEDURE — 97110 THERAPEUTIC EXERCISES: CPT | Mod: GP | Performed by: PHYSICAL THERAPIST

## 2024-12-23 NOTE — PROGRESS NOTES
PHYSICAL THERAPY EVALUATION  Type of Visit: Evaluation       Fall Risk Screen:  Fall screen completed by: PT  Have you fallen 2 or more times in the past year?: (Patient-Rptd) No  Have you fallen and had an injury in the past year?: (Patient-Rptd) Yes  Timed Up and Go score (seconds): not time today-monitor next visit  Is patient a fall risk?: Yes    Subjective         Presenting condition or subjective complaint: (Patient-Rptd) neck pain  The patient presents to therapy with a chief complaint of L>R neck pain. Denies radicular symptoms. Wonders if the neck pain was caused by the the fall off the ladder he had in September. Has had neck pain and a headache since September. If he is moving around and active doesn't feel it, if he is sitting or resting then he definitely notices the pain. Pain is about a 3-4/10 and really impacts his sleep. Has been taking a med for sleep prescribed by Delicia Cook and does help a bit with sleep quality.     Per recent MD note:   MRI lumbar spine October 3, 2024 shows an acute L3 inferior endplate compression fracture with mild to moderate vertebral body height loss.  There is no retropulsion.  There is also multilevel degenerative changes with up to moderate spinal canal stenosis at L4-5 and moderate to severe right L3-4 foraminal stenosis.  Fracture pain presumably occurred in early September when he fell from a ladder.  Follow-up x-rays are stable.  Low back pain has resolved.   MRI:   IMPRESSION:  Chronic superior endplate C7 compression deformity. Anterolisthesis of C6 on C7 measuring 1 mm. The vertebral bodies of the cervical spine otherwise have normal stature, alignment, and marrow signal intensity. No evidence of signal abnormality or   expansion within the cervical spinal cord. No extraspinal abnormality.    1.  At the C5/C6 level, there is a broad bar disc osteophyte complex with moderate spinal canal narrowing. Uncovertebral joint disease and facet arthropathy contributes to  severe bilateral neural foraminal narrowing.  2.  No significant posterior disc bulge or spinal canal narrowing at any other level within the cervical spine.  3.  Multilevel uncovertebral joint disease and facet arthropathy with multilevel neural foraminal narrowing as described above.    Date of onset: 12/05/24 (MD visit)    Relevant medical history: (Patient-Rptd) Arthritis; Osteoporosis; Pain at night or rest   Dates & types of surgery: (Patient-Rptd) rotator cuff, 2008; hernia, 2008    Prior diagnostic imaging/testing results: (Patient-Rptd) MRI     Prior therapy history for the same diagnosis, illness or injury: (Patient-Rptd) Yes (Patient-Rptd) my neck was injured about 30 years ago and therapy resolved the issue but it was aggravated by a fall from a 2-step ladder this past September    Living Environment  Social support: (Patient-Rptd) With a significant other or spouse   Type of home: (Patient-Rptd) House   Stairs to enter the home: (Patient-Rptd) Yes (Patient-Rptd) 5 Is there a railing: (Patient-Rptd) Yes     Ramp: (Patient-Rptd) No   Stairs inside the home: (Patient-Rptd) Yes (Patient-Rptd) 13 Is there a railing: (Patient-Rptd) Yes     Help at home: (Patient-Rptd) None  Equipment owned:       Employment: (Patient-Rptd) No    Hobbies/Interests:      Patient goals for therapy: (Patient-Rptd) be able to turn my head without pain and to sleep through the night    Pain assessment:      Objective     CERVICAL SPINE EVALUATION  PAIN: Pain Level at Rest: 3/10  Pain Level with Use: 7/10  Pain Location: cervical spine  INTEGUMENTARY (edema, incisions):   POSTURE: Sitting Posture: Rounded shoulders, Forward head, Thoracic kyphosis increased  GAIT:   Weightbearing Status:   Assistive Device(s):   Gait Deviations: Base of support decreased  Stride length decreased  Eileen decreased  BALANCE/PROPRIOCEPTION:  continue to monitor next year   ROM:   (Degrees) Left AROM Right AROM   Cervical Flexion 35 deg    Cervical  Extension 40 deg    Cervical Side bend Max limited P+ Max limited P+   Cervical Rotation 30 deg P+ 35 deg P+    Cervical Protrusion    Cervical Retraction    Thoracic Flexion    Thoracic Extension    Thoracic Rotation       STRENGTH (MYOTOMES):  */5 Left Right   Cervical Flexion (C1-2) 5 5   Cervical Sidebending (C3) 5 5   Shoulder Elevation (C4) 5 5   Shoulder Abduction (C5) 5 5   Shoulder Internal Rotation 5 5   Shoulder External Rotation 5 5   Elbow Flexion (C6) 5 5   Elbow Extension (C7) 5 5   Wrist Flexion (C7) 5 5   Wrist Extension (C6) 5 limited wrist extension mobility  5 limited wrist extension mobility    Thumb abduction (C8)     Finger Abduction (T1)       DERMATOMES: WNL    SPECIAL TESTS:   Cervical Special Tests Left Right   Cervical compression     Cervical distraction     Spurling's test neg neg   Shoulder abduction sign     Deep neck flexor endurance test (seconds)    Upper cervical rotation     Upper cervical Sidebending     Sharp-Adryan     Alar ligament test     Other:     UE Nerve Mobility Left Right   Median nerve neg neg   Ulnar nerve negn neg   Radial nerve     Thoracic outlet Left Right   Danitza     Adson's     Cervical rotation lateral flexion     Other:     Other:       PALPATION: tender and hypertonic aida UT     Assessment & Plan   CLINICAL IMPRESSIONS  Medical Diagnosis: Cervicalgia  Arthropathy of cervical facet joint  Myofascial pain    Treatment Diagnosis: Neck Pain   Impression/Assessment: Patient is a 74 year old male with neck complaints.  The following significant findings have been identified: Pain, Decreased ROM/flexibility, Decreased joint mobility, Decreased strength, Impaired balance, Inflammation, Impaired gait, Impaired muscle performance, Decreased activity tolerance, and Impaired posture. These impairments interfere with their ability to perform self care tasks, work tasks, recreational activities, household chores, driving , household mobility, and community mobility as  compared to previous level of function.     Clinical Decision Making (Complexity):  Clinical Presentation: Stable/Uncomplicated  Clinical Presentation Rationale: based on medical and personal factors listed in PT evaluation  Clinical Decision Making (Complexity): Low complexity    PLAN OF CARE  Treatment Interventions:  Modalities: cupping   Interventions: therapeutic exercise, therapeutic activity, self cares, manual therapy, neuromuscular re-ed    Long Term Goals     PT Goal 1  Goal Identifier: Prolonged Positioning  Goal Description: The patient will be able to sustain a position, either sitting or standing x30 minutes with appropriate posture and pain <3/10.  Rationale: to maximize safety and independence with performance of ADLs and functional tasks;to maximize safety and independence with self cares  Target Date: 03/17/25  PT Goal 2  Goal Identifier: driving  Goal Description: THe aptient will be able to turn their head safely with pain <3/10 to improve ease with driving up to 30 minutes  Rationale: to maximize safety and independence with transportation  Target Date: 03/17/25  PT Goal 3  Goal Identifier: Sleeping  Goal Description: THe patient will be able to sleep through the night without waking due to pain >3 times to improve restful sleep  Goal Progress: currently waking up 3-4x/night  Target Date: 03/17/25      Frequency of Treatment: 1x/week tapering to 1x every 2 weeks  Duration of Treatment: 12 weeks    Recommended Referrals to Other Professionals:   Education Assessment:   Learner/Method: Patient  Education Comments: Eager to participate in therapy. Patient demonstrates understanding of plan of care and consents to treatment.    Risks and benefits of evaluation/treatment have been explained.   Patient/Family/caregiver agrees with Plan of Care.     Evaluation Time:     PT Eval, Low Complexity Minutes (54247): 15       Signing Clinician: Yaquelin Felder, DANILO        [facility]                                                                                   OUTPATIENT PHYSICAL THERAPY      PLAN OF TREATMENT FOR OUTPATIENT REHABILITATION   Patient's Last Name, First Name, Nate Howell YOB: 1950   Provider's Name   [facility]   Medical Record No.  [id number]     Onset Date: 12/05/24 (MD visit)  Start of Care Date: 12/23/24     Medical Diagnosis:  Cervicalgia  Arthropathy of cervical facet joint  Myofascial pain      PT Treatment Diagnosis:  Neck Pain Plan of Treatment  Frequency/Duration: 1x/week tapering to 1x every 2 weeks/ 12 weeks    Certification date from 12/23/24 to 03/17/25         See note for plan of treatment details and functional goals     Yaquelin Felder, PT                         I CERTIFY THE NEED FOR THESE SERVICES FURNISHED UNDER        THIS PLAN OF TREATMENT AND WHILE UNDER MY CARE     (Physician attestation of this document indicates review and certification of the therapy plan).              Referring Provider:  Delicia Cook    Initial Assessment  See Epic Evaluation- Start of Care Date: 12/23/24

## 2024-12-30 ENCOUNTER — THERAPY VISIT (OUTPATIENT)
Dept: PHYSICAL THERAPY | Facility: REHABILITATION | Age: 74
End: 2024-12-30
Payer: COMMERCIAL

## 2024-12-30 DIAGNOSIS — M54.2 NECK PAIN: Primary | ICD-10-CM

## 2024-12-30 PROCEDURE — 97110 THERAPEUTIC EXERCISES: CPT | Mod: GP | Performed by: PHYSICAL THERAPIST

## 2024-12-30 PROCEDURE — 97112 NEUROMUSCULAR REEDUCATION: CPT | Mod: GP | Performed by: PHYSICAL THERAPIST

## 2024-12-30 PROCEDURE — 97140 MANUAL THERAPY 1/> REGIONS: CPT | Mod: GP | Performed by: PHYSICAL THERAPIST

## 2025-01-06 ENCOUNTER — THERAPY VISIT (OUTPATIENT)
Dept: PHYSICAL THERAPY | Facility: REHABILITATION | Age: 75
End: 2025-01-06
Payer: COMMERCIAL

## 2025-01-06 DIAGNOSIS — M54.2 NECK PAIN: Primary | ICD-10-CM

## 2025-01-06 PROCEDURE — 97140 MANUAL THERAPY 1/> REGIONS: CPT | Mod: GP | Performed by: PHYSICAL THERAPIST

## 2025-01-06 PROCEDURE — 97110 THERAPEUTIC EXERCISES: CPT | Mod: GP | Performed by: PHYSICAL THERAPIST

## 2025-01-06 PROCEDURE — 97112 NEUROMUSCULAR REEDUCATION: CPT | Mod: GP | Performed by: PHYSICAL THERAPIST

## 2025-01-16 ENCOUNTER — THERAPY VISIT (OUTPATIENT)
Dept: PHYSICAL THERAPY | Facility: REHABILITATION | Age: 75
End: 2025-01-16
Payer: COMMERCIAL

## 2025-01-16 DIAGNOSIS — M54.2 NECK PAIN: Primary | ICD-10-CM

## 2025-01-23 ENCOUNTER — THERAPY VISIT (OUTPATIENT)
Dept: PHYSICAL THERAPY | Facility: REHABILITATION | Age: 75
End: 2025-01-23
Payer: COMMERCIAL

## 2025-01-23 DIAGNOSIS — M54.2 NECK PAIN: Primary | ICD-10-CM

## 2025-01-23 NOTE — PROGRESS NOTES
Assessment:   Nate North is a 74 year old y.o. male with past medical history significant for hypertension, BPH, depression, prediabetes, memory loss  who presents today for follow-up regarding chronic left greater than right neck pain.  My review of an MRI cervical spine shows a disc osteophyte complex at C5-6 contributing to moderate spinal canal stenosis and severe bilateral foraminal stenosis.  There is also facet arthropathy.  Pain is most consistent with cervical facet arthropathy with secondary myofascial pain.    Patient has had 5 sessions of physical therapy for neck pain with at least 25% improvement in pain.  He does not have any radicular symptoms.  He is neurologically intact.       Plan:     A shared decision making plan was used.  The patient's values and choices were respected.  The following represents what was discussed and decided upon by the physician assistant and the patient.      1.  DIAGNOSTIC TESTS:  - I reviewed the MRI cervical spine.  - No additional diagnostic test were ordered.      2.  PHYSICAL THERAPY: Patient is currently in physical therapy for neck pain.  He has had 5 sessions so far.  Encouraged him to continue with physical therapy and the home exercises.    3.  MEDICATIONS:  - Patient can continue tizanidine as needed.  - Patient can continue Tylenol as needed.      4.  INTERVENTIONS: No interventions were ordered today.  Patient is making progress with conservative treatment.  If pain worsens again or fails to improve further we could consider medial branch blocks as a workup toward radiofrequency ablation.    5.  PATIENT EDUCATION: Patient is in agreement the above plan.  All questions were answered.    6.  FOLLOW-UP: I offered to follow-up with the patient in 6 weeks to continue to monitor progress with physical therapy.  He declined.  He prefers to follow-up as needed.  If he has questions or concerns, he should not hesitate to call.    Subjective:     Nate North  is a 74 year old male who presents today for follow-up regarding chronic neck pain I last saw this patient December 5, 2024.   I refer the patient to physical therapy and I prescribed tizanidine.  He has had 5 sessions.  Patient reports 25% improvement in pain.    Patient complains of mild to moderate residual bilateral neck pain.  She denies any pain radiating in the shoulders or down the arms.  Denies any numbness, tingling, weakness.  He rates his pain today as a 3 out of 10.  At its worst it is a 6 out of 10.  At its best it is a 1 out of 10.  Pain is aggravated with turning his neck which is most noticeable when driving.  Pain is alleviated with exercise.  Denies new symptoms since he was last seen.    Treatment to date:  - Current physical therapy for neck pain, 5 sessions so far with at least 25% improvement  - No chiropractic treatment  - No spine injections  - No spine surgeries  - Tylenol 650 mg at bedtime  - Aspirin  - Tizanidine as needed helpful    Review of Systems:  Positive for headache, pain much worse at night.  Negative for numbness/tingling, weakness, loss of bowel/bladder control, inability to urinate, trip//falls, difficulty swallowing, fevers, unintentional weight loss, difficulty with hand skills.     Objective:   CONSTITUTIONAL:  Vital signs as above.  No acute distress.  The patient is well nourished and well groomed.    PSYCHIATRIC:  The patient is awake, alert, oriented to person, place and time.  The patient is answering questions appropriately with clear speech.  Normal affect.  HEENT: Normocephalic, atraumatic.  Sclera clear.    SKIN: Exposed skin is clean, dry, intact without rashes.  MUSCULOSKELETAL:  Gait is non-antalgic.  Moderate restriction with cervical lateral rotation bilaterally.  5/5 strength bilateral shoulder abductors, elbow flexors/extensors, wrist extensors, finger flexors/abductors.  NEUROLOGICAL:   Sensation to light touch is intact in the bilateral upper extremities  throughout.  Negative Abe sign bilaterally.     RESULTS:    I reviewed the MRI cervical spine dated November 19, 2024.  There is multilevel facet arthropathy.  At C5-6 there is a disc osteophyte complex with moderate spinal canal stenosis and severe bilateral foraminal stenosis.

## 2025-01-23 NOTE — PROGRESS NOTES
Assessment:   The patient has participated in 5 visits of PT for treatment of his neck pain. He reports 50% improvement in symptoms, but the neck remains stiff and is continuing to have trouble with turning his head while driving, also sleeping remains difficult. The patient will follow up with Delicia Cook next week and feels that PT is helping so hopes to continue with therapy as well.      PLAN  Continue therapy per current plan of care. Taper to 1x every other week.     Beginning/End Dates of Progress Note Reporting Period:  12/23/24 to 01/23/2025    Referring Provider:  Delicia Cook     01/23/25 0500   Appointment Info   Signing clinician's name / credentials Yaquelin Felder, PT DPT   Total/Authorized Visits E&T   Visits Used 5   Medical Diagnosis Cervicalgia  Arthropathy of cervical facet joint  Myofascial pain   PT Tx Diagnosis Neck Pain   Precautions/Limitations lumbar compression fracture, Osteoporosis   Other pertinent information per MD: consider cervical medial branch blocks as a workup toward radiofrequency ablation if failed conservative management   Progress Note/Certification   Start of Care Date 12/23/24   Onset of illness/injury or Date of Surgery 12/05/24  (MD visit)   Therapy Frequency 1x/week tapering to 1x every 2 weeks   Predicted Duration 12 weeks   Certification date from 12/23/24   Certification date to 03/17/25   Progress Note Completed Date 12/23/24   GOALS   PT Goals 2;3   PT Goal 1   Goal Identifier Prolonged Positioning   Goal Description The patient will be able to sustain a position, either sitting or standing x30 minutes with appropriate posture and pain <3/10.   Rationale to maximize safety and independence with performance of ADLs and functional tasks;to maximize safety and independence with self cares   Goal Progress goal progressing   Target Date 03/17/25   PT Goal 2   Goal Identifier driving   Goal Description THe aptient will be able to turn their head safely with pain  <3/10 to improve ease with driving up to 30 minutes   Rationale to maximize safety and independence with transportation   Goal Progress continued difficulty with rotating head to look behind him   Target Date 03/17/25   PT Goal 3   Goal Identifier Sleeping   Goal Description THe patient will be able to sleep through the night without waking due to pain >3 times to improve restful sleep   Goal Progress progressing gradually, waking up 2-3x/night   Target Date 03/17/25   Subjective Report   Subjective Report THe patient reports that he feels about 50% improvement since starting therapy.   Objective Measures   Objective Measures Objective Measure 1;Objective Measure 2;Objective Measure 3;Objective Measure 5;Objective Measure 4   Objective Measure 1   Objective Measure Pain Rating   Details 4/10   Objective Measure 2   Objective Measure sit to stand:   Details able to perform x10 reps without use of UE and good eccentric control   Objective Measure 3   Objective Measure Cervical AROM   Details 50 deg flexion, 40 deg exension  20 deg L deg, 28 deg R   45 deg rotation   Objective Measure 4   Objective Measure NDI:   Details 16% today improved from 30% on IE   Treatment Interventions (PT)   Interventions Therapeutic Procedure/Exercise;Neuromuscular Re-education;Manual Therapy   Therapeutic Procedure/Exercise   Therapeutic Procedures: strength, endurance, ROM, flexibility minutes (38405) 15   Therapeutic Procedures Ther Proc 2   Ther Proc 1 Education   Ther Proc 1 - Details reassessment of objective measures and progress, next steps in POC   Ther Proc 2 UBE 2:2 with subjective assessment   PTRx Ther Proc 1 Scapular Retraction/Depression   PTRx Ther Proc 1 - Details verbal review   PTRx Ther Proc 2 Self Cervical Snag with Towel   PTRx Ther Proc 2 - Details verbal review   PTRx Ther Proc 3 Sit to Stand   PTRx Ther Proc 3 - Details verbal review   Skilled Intervention educated, reviewed and discussed HEP   Patient  "Response/Progress good tolerance   Neuromuscular Re-education   Neuro Re-ed 1 continued education on posture, trialed towel roll for cervical pillow with good tolerance   PTRx Neuro Re-ed 1 Shoulder Theraband Rows   PTRx Neuro Re-ed 1 - Details red tubing in clinic GTB x10 for home    PTRx Neuro Re-ed 2 Tandem Stance   PTRx Neuro Re-ed 2 - Details 3x30\" holds on airex    PTRx Neuro Re-ed 3 Cervical Retraction   PTRx Neuro Re-ed 3 - Details x10 review with cueing    PTRx Neuro Re-ed 4 Single Leg Stance - Balance Right Foot   PTRx Neuro Re-ed 4 - Details 3x30\" holds    Skilled Intervention education and cueing   Patient Response/Progress demonstrated understanding   Manual Therapy   Manual Therapy: Mobilization, MFR, MLD, friction massage minutes (58273) 23   Manual Therapy 1 MFR layer 2-3 to aida UT and suboccipital release in supine   Skilled Intervention to improve pain and mobility   Patient Response/Progress tolerated well, mild stiffness following MT but improved cervical rotation   Education   Learner/Method Patient   Education Comments Eager to participate in therapy. Patient demonstrates understanding of plan of care and consents to treatment.   Plan   Home program see PTRX printed   Plan for next session continue MT if helpful, progress rowing/pulldowns, progress balance   Comments   Comments The patient has participated in 5 visits of PT for treatment of his neck pain. He reports 50% improvement in symptoms, but the neck remains stiff and is continuing to have trouble with turning his head while driving, also sleeping remains difficult. The patient will follow up with Delicia Cook next week and feels that PT is helping so hopes to continue with therapy as well.     "

## 2025-01-30 ENCOUNTER — OFFICE VISIT (OUTPATIENT)
Dept: PHYSICAL MEDICINE AND REHAB | Facility: CLINIC | Age: 75
End: 2025-01-30
Payer: COMMERCIAL

## 2025-01-30 VITALS
SYSTOLIC BLOOD PRESSURE: 139 MMHG | DIASTOLIC BLOOD PRESSURE: 65 MMHG | HEART RATE: 65 BPM | BODY MASS INDEX: 23.05 KG/M2 | HEIGHT: 64 IN | WEIGHT: 135 LBS

## 2025-01-30 DIAGNOSIS — M54.2 CERVICALGIA: Primary | ICD-10-CM

## 2025-01-30 DIAGNOSIS — M79.18 MYOFASCIAL PAIN: ICD-10-CM

## 2025-01-30 DIAGNOSIS — M47.812 ARTHROPATHY OF CERVICAL FACET JOINT: ICD-10-CM

## 2025-01-30 ASSESSMENT — PAIN SCALES - GENERAL: PAINLEVEL_OUTOF10: MILD PAIN (3)

## 2025-01-30 NOTE — LETTER
1/30/2025      Nate North  [address] [place] [address]      Dear Colleague,    Thank you for referring your patient, Nate North, to the [facility]. Please see a copy of my visit note below.    Assessment:   Nate North is a 74 year old y.o. male with past medical history significant for hypertension, BPH, depression, prediabetes, memory loss  who presents today for follow-up regarding chronic left greater than right neck pain.  My review of an MRI cervical spine shows a disc osteophyte complex at C5-6 contributing to moderate spinal canal stenosis and severe bilateral foraminal stenosis.  There is also facet arthropathy.  Pain is most consistent with cervical facet arthropathy with secondary myofascial pain.    Patient has had 5 sessions of physical therapy for neck pain with at least 25% improvement in pain.  He does not have any radicular symptoms.  He is neurologically intact.       Plan:     A shared decision making plan was used.  The patient's values and choices were respected.  The following represents what was discussed and decided upon by the physician assistant and the patient.      1.  DIAGNOSTIC TESTS:  - I reviewed the MRI cervical spine.  - No additional diagnostic test were ordered.      2.  PHYSICAL THERAPY: Patient is currently in physical therapy for neck pain.  He has had 5 sessions so far.  Encouraged him to continue with physical therapy and the home exercises.    3.  MEDICATIONS:  - Patient can continue tizanidine as needed.  - Patient can continue Tylenol as needed.      4.  INTERVENTIONS: No interventions were ordered today.  Patient is making progress with conservative treatment.  If pain worsens again or fails to improve further we could consider medial branch blocks as a workup toward radiofrequency ablation.    5.  PATIENT EDUCATION: Patient is in agreement the above plan.  All questions were answered.    6.  FOLLOW-UP: I offered  to follow-up with the patient in 6 weeks to continue to monitor progress with physical therapy.  He declined.  He prefers to follow-up as needed.  If he has questions or concerns, he should not hesitate to call.    Subjective:     Nate North is a 74 year old male who presents today for follow-up regarding chronic neck pain I last saw this patient December 5, 2024.   I refer the patient to physical therapy and I prescribed tizanidine.  He has had 5 sessions.  Patient reports 25% improvement in pain.    Patient complains of mild to moderate residual bilateral neck pain.  She denies any pain radiating in the shoulders or down the arms.  Denies any numbness, tingling, weakness.  He rates his pain today as a 3 out of 10.  At its worst it is a 6 out of 10.  At its best it is a 1 out of 10.  Pain is aggravated with turning his neck which is most noticeable when driving.  Pain is alleviated with exercise.  Denies new symptoms since he was last seen.    Treatment to date:  - Current physical therapy for neck pain, 5 sessions so far with at least 25% improvement  - No chiropractic treatment  - No spine injections  - No spine surgeries  - Tylenol 650 mg at bedtime  - Aspirin  - Tizanidine as needed helpful    Review of Systems:  Positive for headache, pain much worse at night.  Negative for numbness/tingling, weakness, loss of bowel/bladder control, inability to urinate, trip//falls, difficulty swallowing, fevers, unintentional weight loss, difficulty with hand skills.     Objective:   CONSTITUTIONAL:  Vital signs as above.  No acute distress.  The patient is well nourished and well groomed.    PSYCHIATRIC:  The patient is awake, alert, oriented to person, place and time.  The patient is answering questions appropriately with clear speech.  Normal affect.  HEENT: Normocephalic, atraumatic.  Sclera clear.    SKIN: Exposed skin is clean, dry, intact without rashes.  MUSCULOSKELETAL:  Gait is non-antalgic.  Moderate  restriction with cervical lateral rotation bilaterally.  5/5 strength bilateral shoulder abductors, elbow flexors/extensors, wrist extensors, finger flexors/abductors.  NEUROLOGICAL:   Sensation to light touch is intact in the bilateral upper extremities throughout.  Negative Abe sign bilaterally.     RESULTS:    I reviewed the MRI cervical spine dated November 19, 2024.  There is multilevel facet arthropathy.  At C5-6 there is a disc osteophyte complex with moderate spinal canal stenosis and severe bilateral foraminal stenosis.      Again, thank you for allowing me to participate in the care of your patient.        Sincerely,        Delicia Cook PA-C    Electronically signed

## 2025-01-30 NOTE — PATIENT INSTRUCTIONS
I am so happy that you are feeling better! Keep up the great work with physical therapy!    If your pain returns or if you have any other questions or concerns please send me a [employer] message or call our nurse line at [phone].

## 2025-02-12 ENCOUNTER — THERAPY VISIT (OUTPATIENT)
Dept: PHYSICAL THERAPY | Facility: REHABILITATION | Age: 75
End: 2025-02-12
Payer: COMMERCIAL

## 2025-02-12 DIAGNOSIS — M54.2 NECK PAIN: Primary | ICD-10-CM

## 2025-02-12 PROCEDURE — 97140 MANUAL THERAPY 1/> REGIONS: CPT | Mod: GP | Performed by: PHYSICAL THERAPIST

## 2025-02-12 PROCEDURE — 97110 THERAPEUTIC EXERCISES: CPT | Mod: GP | Performed by: PHYSICAL THERAPIST

## 2025-02-26 ENCOUNTER — THERAPY VISIT (OUTPATIENT)
Dept: PHYSICAL THERAPY | Facility: REHABILITATION | Age: 75
End: 2025-02-26
Payer: COMMERCIAL

## 2025-02-26 DIAGNOSIS — M54.2 NECK PAIN: Primary | ICD-10-CM

## 2025-02-26 PROCEDURE — 97110 THERAPEUTIC EXERCISES: CPT | Mod: GP | Performed by: PHYSICAL THERAPIST

## 2025-02-26 PROCEDURE — 97140 MANUAL THERAPY 1/> REGIONS: CPT | Mod: GP | Performed by: PHYSICAL THERAPIST

## 2025-03-10 DIAGNOSIS — F32.A DEPRESSION, UNSPECIFIED DEPRESSION TYPE: ICD-10-CM

## 2025-03-12 ENCOUNTER — THERAPY VISIT (OUTPATIENT)
Dept: PHYSICAL THERAPY | Facility: REHABILITATION | Age: 75
End: 2025-03-12
Payer: COMMERCIAL

## 2025-03-12 DIAGNOSIS — M54.2 NECK PAIN: Primary | ICD-10-CM

## 2025-03-13 PROBLEM — M54.2 NECK PAIN: Status: RESOLVED | Noted: 2024-12-23 | Resolved: 2025-03-13

## 2025-03-13 NOTE — PROGRESS NOTES
03/12/25 0500   Appointment Info   Signing clinician's name / credentials Yaquelin Felder, PT DPT   Total/Authorized Visits E&T   Visits Used 8   Medical Diagnosis Cervicalgia  Arthropathy of cervical facet joint  Myofascial pain   PT Tx Diagnosis Neck Pain   Precautions/Limitations lumbar compression fracture, Osteoporosis   Other pertinent information per MD: consider cervical medial branch blocks as a workup toward radiofrequency ablation if failed conservative management   Progress Note/Certification   Start of Care Date 12/23/24   Onset of illness/injury or Date of Surgery 12/05/24  (MD visit)   Therapy Frequency 1x/week tapering to 1x every 2 weeks   Predicted Duration 12 weeks   Certification date from 12/23/24   Certification date to 03/17/25   Progress Note Completed Date 12/23/24   GOALS   PT Goals 2;3   PT Goal 1   Goal Identifier Prolonged Positioning   Goal Description The patient will be able to sustain a position, either sitting or standing x30 minutes with appropriate posture and pain <3/10.   Rationale to maximize safety and independence with performance of ADLs and functional tasks;to maximize safety and independence with self cares   Goal Progress goal met   Target Date 03/17/25   Date Met 03/12/14   PT Goal 2   Goal Identifier driving   Goal Description THe aptient will be able to turn their head safely with pain <3/10 to improve ease with driving up to 30 minutes   Rationale to maximize safety and independence with transportation   Goal Progress goal met. continued stiffness of rotation   Target Date 03/17/25   Date Met 03/13/25   PT Goal 3   Goal Identifier Sleeping   Goal Description THe patient will be able to sleep through the night without waking due to pain >3 times to improve restful sleep   Goal Progress goal met   Target Date 03/17/25   Date Met 03/13/25   Subjective Report   Subjective Report The patient has attended PT consisently for treatment of his neck stiffness and pain.  "He has reported gradual improvement and feels that although it isn't perfect, he feels that things have progressed enough that he can continue independently at this time.   Objective Measures   Objective Measures Objective Measure 1;Objective Measure 2;Objective Measure 3;Objective Measure 5;Objective Measure 4   Objective Measure 1   Objective Measure Pain Rating   Details 4/10   Objective Measure 2   Objective Measure sit to stand:   Details able to perform x10 reps without use of UE and good eccentric control   Objective Measure 3   Objective Measure Cervical AROM   Details 50 deg flexion, 40 deg exension  20 deg L deg, 28 deg R   45 deg rotation   Objective Measure 4   Objective Measure NDI:   Details 16% today improved from 30% on IE   Treatment Interventions (PT)   Interventions Therapeutic Procedure/Exercise;Neuromuscular Re-education;Manual Therapy   Therapeutic Procedure/Exercise   Therapeutic Procedures: strength, endurance, ROM, flexibility minutes (57484) 15   Therapeutic Procedures Ther Proc 2   Ther Proc 1 Education   Ther Proc 1 - Details review of HEP, education regarding postural improvements, balance and safety, continued activity.   Ther Proc 2 UBE 2:2 with subjective assessment   Ther Proc 2 - Details UT stretch 2x10\" holds   PTRx Ther Proc 1 Scapular Retraction/Depression   PTRx Ther Proc 1 - Details verbal review   PTRx Ther Proc 2 Self Cervical Snag with Towel   PTRx Ther Proc 2 - Details verbal review   PTRx Ther Proc 3 Sit to Stand   PTRx Ther Proc 3 - Details verbal review   Skilled Intervention educated, reviewed and discussed HEP   Patient Response/Progress good tolerance   Neuromuscular Re-education   Neuromuscular re-ed of mvmt, balance, coord, kinesthetic sense, posture, proprioception minutes (28800) 12   Neuro Re-ed 1 continued education on posture, trialed towel roll for cervical pillow with good tolerance   PTRx Neuro Re-ed 1 Shoulder Theraband Rows   PTRx Neuro Re-ed 1 - Details " "red tubing in clinic GTB x10 for home    PTRx Neuro Re-ed 2 Tandem Stance   PTRx Neuro Re-ed 2 - Details 3x30\" holds on airex    PTRx Neuro Re-ed 3 Cervical Retraction   PTRx Neuro Re-ed 3 - Details x10 review with cueing    PTRx Neuro Re-ed 4 Single Leg Stance - Balance Right Foot   PTRx Neuro Re-ed 4 - Details 3x30\" holds    Skilled Intervention education and cueing   Patient Response/Progress demonstrated understanding   Manual Therapy   Manual Therapy: Mobilization, MFR, MLD, friction massage minutes (23469) 15   Manual Therapy 1 MFR layer 2-3 to aida UT and suboccipital release in supine   Skilled Intervention to improve pain and mobility   Patient Response/Progress tolerated well, mild stiffness following MT but improved cervical rotation   Education   Learner/Method Patient   Education Comments Eager to participate in therapy. Patient demonstrates understanding of plan of care and consents to treatment.   Plan   Home program see PTRX printed   Plan for next session d/c   Comments   Comments The patient has attended PT consistently and is demonstrating improvements in pain, mobility and function. At this point goals have been met and they feel comfortable continuing with independent management of their condition. Advised the patient to return to therapy as needed in the future and to continue with prescribed HEP. The patient will be discharged at this time.   Total Session Time   Timed Code Treatment Minutes 42   Total Treatment Time (sum of timed and untimed services) 42       DISCHARGE  Reason for Discharge: Patient has met all goals.      Discharge Plan: Patient to continue home program.    Referring Provider:  Delicia Cook    "

## 2025-03-30 DIAGNOSIS — M79.18 MYOFASCIAL PAIN: ICD-10-CM

## 2025-03-31 NOTE — TELEPHONE ENCOUNTER
Last appointment:01/30/2025   Next appointment: None    Notes/Comments:      Rx request(s) has been reviewed.

## 2025-04-01 RX ORDER — TIZANIDINE 2 MG/1
TABLET ORAL
Qty: 60 TABLET | Refills: 1 | Status: SHIPPED | OUTPATIENT
Start: 2025-04-01

## 2025-05-08 ENCOUNTER — OFFICE VISIT (OUTPATIENT)
Dept: FAMILY MEDICINE | Facility: CLINIC | Age: 75
End: 2025-05-08
Payer: COMMERCIAL

## 2025-05-08 ENCOUNTER — ANCILLARY PROCEDURE (OUTPATIENT)
Dept: GENERAL RADIOLOGY | Facility: CLINIC | Age: 75
End: 2025-05-08
Attending: NURSE PRACTITIONER
Payer: COMMERCIAL

## 2025-05-08 VITALS
DIASTOLIC BLOOD PRESSURE: 78 MMHG | OXYGEN SATURATION: 98 % | TEMPERATURE: 98 F | HEIGHT: 64 IN | BODY MASS INDEX: 23.41 KG/M2 | RESPIRATION RATE: 14 BRPM | SYSTOLIC BLOOD PRESSURE: 150 MMHG | HEART RATE: 93 BPM | WEIGHT: 137.1 LBS

## 2025-05-08 DIAGNOSIS — M81.0 OSTEOPOROSIS, UNSPECIFIED OSTEOPOROSIS TYPE, UNSPECIFIED PATHOLOGICAL FRACTURE PRESENCE: ICD-10-CM

## 2025-05-08 DIAGNOSIS — M54.50 ACUTE RIGHT-SIDED LOW BACK PAIN WITHOUT SCIATICA: Primary | ICD-10-CM

## 2025-05-08 DIAGNOSIS — M54.50 ACUTE RIGHT-SIDED LOW BACK PAIN WITHOUT SCIATICA: ICD-10-CM

## 2025-05-08 PROCEDURE — 99213 OFFICE O/P EST LOW 20 MIN: CPT | Performed by: NURSE PRACTITIONER

## 2025-05-08 PROCEDURE — 3077F SYST BP >= 140 MM HG: CPT | Performed by: NURSE PRACTITIONER

## 2025-05-08 PROCEDURE — 3078F DIAST BP <80 MM HG: CPT | Performed by: NURSE PRACTITIONER

## 2025-05-08 ASSESSMENT — ENCOUNTER SYMPTOMS: BACK PAIN: 1

## 2025-05-08 NOTE — PROGRESS NOTES
"Assessment & Plan     Acute right-sided low back pain without sciatica  Osteoporosis, unspecified osteoporosis type, unspecified pathological fracture presence  I do think this is likely muscular however with history of   - XR Lumbar Spine 2/3 Views; Future                {Follow-up (Optional):283082}    Inder Glover is a 74 year old, presenting for the following health issues:  Back Pain      5/8/2025     2:31 PM   Additional Questions   Roomed by INEZ Latif     Back Pain     History of Present Illness       Reason for visit:  Very sore pain in right buttock  Symptom onset:  1-3 days ago   He is taking medications regularly.      Was trying to dig out bush from garden and was hitting tree. No spasms/tingling. Has been taking 650 acetaminophen in the evening. Has been taking zanaflex nightly for the back. Was previously taking every other day for neck.     Yesterday could walk well and then today could walk further. Feels like it is getting better.     History of osteoporosis.-has previous history of compression fracture.     {MA/LPN/RN Pre-Provider Visit Orders- hCG/UA/Strep (Optional):402754}  {SUPERLIST (Optional):998153}  {additonal problems for provider to add (Optional):671499}    {ROS Picklists (Optional):806862}      Objective    BP (!) 150/78 (BP Location: Right arm, Patient Position: Sitting, Cuff Size: Adult Regular)   Pulse 93   Resp 14   Ht 1.62 m (5' 3.78\")   Wt 62.2 kg (137 lb 1.6 oz)   SpO2 98%   BMI 23.70 kg/m    Body mass index is 23.7 kg/m .  Physical Exam  Constitutional:       Appearance: Normal appearance.   Musculoskeletal:      Lumbar back: Tenderness (left lower lumbar region) present. No swelling or bony tenderness. Normal range of motion. Negative right straight leg raise test and negative left straight leg raise test.   Neurological:      General: No focal deficit present.      Mental Status: He is alert and oriented to person, place, and time.      Sensory: No sensory deficit. "      Motor: No weakness.   Psychiatric:         Mood and Affect: Mood normal.         Behavior: Behavior normal.      {Exam List (Optional):083921}    {Diagnostic Test Results (Optional):805001}        Signed Electronically by: JONATHAN Osuna CNP  {Email feedback regarding this note to [email or web address]   :732692}

## 2025-05-12 ENCOUNTER — RESULTS FOLLOW-UP (OUTPATIENT)
Dept: FAMILY MEDICINE | Facility: CLINIC | Age: 75
End: 2025-05-12

## 2025-06-17 ENCOUNTER — OFFICE VISIT (OUTPATIENT)
Dept: ENDOCRINOLOGY | Facility: CLINIC | Age: 75
End: 2025-06-17
Attending: PHYSICIAN ASSISTANT
Payer: COMMERCIAL

## 2025-06-17 VITALS
BODY MASS INDEX: 23.68 KG/M2 | RESPIRATION RATE: 16 BRPM | SYSTOLIC BLOOD PRESSURE: 127 MMHG | OXYGEN SATURATION: 98 % | WEIGHT: 137 LBS | DIASTOLIC BLOOD PRESSURE: 70 MMHG | HEART RATE: 77 BPM

## 2025-06-17 DIAGNOSIS — M80.00XD AGE-RELATED OSTEOPOROSIS WITH CURRENT PATHOLOGICAL FRACTURE WITH ROUTINE HEALING, SUBSEQUENT ENCOUNTER: ICD-10-CM

## 2025-06-17 DIAGNOSIS — M80.88XD OSTEOPOROTIC COMPRESSION FRACTURE OF VERTEBRA WITH ROUTINE HEALING, SUBSEQUENT ENCOUNTER: ICD-10-CM

## 2025-06-17 LAB
ALBUMIN SERPL BCG-MCNC: 4.7 G/DL (ref 3.5–5.2)
CALCIUM SERPL-MCNC: 9.9 MG/DL (ref 8.8–10.4)
CREAT SERPL-MCNC: 0.89 MG/DL (ref 0.67–1.17)
EGFRCR SERPLBLD CKD-EPI 2021: 89 ML/MIN/1.73M2
MAGNESIUM SERPL-MCNC: 2.2 MG/DL (ref 1.7–2.3)
PHOSPHATE SERPL-MCNC: 2.9 MG/DL (ref 2.5–4.5)

## 2025-06-17 NOTE — PROGRESS NOTES
Endocrinology Clinic Visit 6/17/2025    NAME:  Nate North  PCP:  Juan Wu  MRN:  [id number]  Reason for Consult:  osteoporosis   Requesting Provider:  Delicia Cook    Chief Complaint     Chief Complaint   Patient presents with    New Patient     osteoporosis       History of Present Illness     Nate North is a 74 year old male who is seen in clinic for osteoporosis. His PMH is also significant for HTN, anxiety/depression and chronic back pain.     He reported back pain after falling from a 2 step ladder along with worsening neck pain.   The patient had a DXA scan on 10/2024 which showed:     DXA RESULTS  -Lumbar Spine: L1-L4 (L3): BMD: 1.103 g/cm2. T-score: -0.9. Z-score: -0.3.  -RIGHT Hip Total: BMD: 0.837 g/cm2. T-score: -1.8. Z-score: -0.9.  -RIGHT Hip Femoral neck: BMD: 0.719 g/cm2. T-score: -2.7. Z-score: -1.3.  -LEFT Hip Total: BMD: 0.848 g/cm2. T-score: -1.8. Z-score: -0.9.  -LEFT Hip Femoral neck: BMD: 0.767 g/cm2. T-score: -2.3. Z-score: -0.9.      Calcium intake:   Dietary: cheese everyday, milk with cereal, no regular yogurt or ice cream   Supplements: no    Vitamin D intake:   Supplements: no   No vitamin D checked.     Osteoporosis Risk factors:   Previous fractures:   9/2024 lumbar xray : Bones appear demineralized which limits evaluation for fractures. Presumed small 12th ribs. Mild wedge compression deformity of the L3 vertebral body which could represent fracture.   MRI 10/2024: Recent appearing L3 inferior endplate compression fracture with mild to moderate vertebral height loss.    Ankle fracture in the 1960s.     Family history of fragility fracture in parent: no  Current smoking: no, quit at age 19  Steroid Use: no  Rheumatoid  arthritis: no  Alcohol (3 or more units per day): yes, everyday 2 glasses of wine and couple oz of bourbon  Other medications known to affect BMD: no   Reported loss of height: 2 inches.   Tendency for falls: no   GI history: Malabsorption (IBD, Celiac,  gastric bypass ): no   History of kidney stones: no  History of thyroid disease: no  Physical activity: active at home, doing PT. No regular routine.   No radiation exposure  No dental procedure planned. He gets regular dental check up.     ROS significant for diffuse non specific joint pain.     Problem List     Patient Active Problem List   Diagnosis    Degenerative cervical disc    BPH (benign prostatic hyperplasia)    Prediabetes    Actinic keratitis    Hypertension    Memory loss    Mood disorder    Osteoporosis    Low back pain        Medications     Current Outpatient Medications   Medication Sig Dispense Refill    acetaminophen (TYLENOL) 325 MG tablet Take 650 mg by mouth daily as needed for mild pain.      lisinopril (ZESTRIL) 20 MG tablet TAKE ONE TABLET BY MOUTH EVERY DAY 90 tablet 3    loperamide (IMODIUM) 2 MG capsule Take 2 mg by mouth 4 times daily as needed for diarrhea      sertraline (ZOLOFT) 50 MG tablet TAKE ONE TABLET BY MOUTH EVERY DAY 90 tablet 1    tiZANidine (ZANAFLEX) 2 MG tablet TAKE ONE TO TWO TABLETS BY MOUTH AT BEDTIME AS NEEDED FOR MUSCLE SPASMS 60 tablet 1     Current Facility-Administered Medications   Medication Dose Route Frequency Provider Last Rate Last Admin    lidocaine 1% with EPINEPHrine 1:100,000 injection 3 mL  3 mL Intradermal Once             Allergies     No Known Allergies    Medical / Surgical History     Past Medical History:   Diagnosis Date    Anxiety     Work related. Minor attacks lasting 10-15 seconds. Substantial improvement since [facility]. Sense of disconnectedness      Past Surgical History:   Procedure Laterality Date    ARTHROSCOPY SHOULDER ROTATOR CUFF REPAIR Right 2007    INGUINAL HERNIA REPAIR Right     VASECTOMY  1997            Social History     Social History     Socioeconomic History    Marital status:      Spouse name: Not on file    Number of children: 1    Years of education: Not on file    Highest education level: Not on file    Occupational History    Occupation: Various     Employer: [employer]     Comment: Retired    Occupation: Water meters     Comment: Retired   Tobacco Use    Smoking status: Former     Current packs/day: 0.00     Average packs/day: 0.5 packs/day for 5.0 years (2.5 ttl pk-yrs)     Types: Cigars, Cigarettes     Start date: 1958     Quit date: 1963     Years since quittin.1     Passive exposure: Never    Smokeless tobacco: Never    Tobacco comments:     smoked a few years in high school   Vaping Use    Vaping status: Never Used   Substance and Sexual Activity    Alcohol use: Yes     Alcohol/week: 7.0 standard drinks of alcohol     Types: 7 Standard drinks or equivalent per week     Comment: Daily    Drug use: No    Sexual activity: Yes     Partners: Female   Other Topics Concern    Parent/sibling w/ CABG, MI or angioplasty before 65F 55M? Not Asked   Social History Narrative    Diet-healthy            Exercise-walking 3-4x week     Social Drivers of Health     Financial Resource Strain: Low Risk  (2024)    Financial Resource Strain     Within the past 12 months, have you or your family members you live with been unable to get utilities (heat, electricity) when it was really needed?: No   Food Insecurity: Low Risk  (2024)    Food Insecurity     Within the past 12 months, did you worry that your food would run out before you got money to buy more?: No     Within the past 12 months, did the food you bought just not last and you didn t have money to get more?: No   Transportation Needs: Low Risk  (2024)    Transportation Needs     Within the past 12 months, has lack of transportation kept you from medical appointments, getting your medicines, non-medical meetings or appointments, work, or from getting things that you need?: No   Physical Activity: Insufficiently Active (2024)    Exercise Vital Sign     Days of Exercise per Week: 2 days     Minutes of Exercise per Session: 10 min   Stress: No  Stress Concern Present (11/9/2024)    [place] [place] [employer] - Occupational Stress Questionnaire     Feeling of Stress : Only a little   Social Connections: Unknown (11/9/2024)    Social Connection and Isolation Panel [NHANES]     Frequency of Communication with Friends and Family: Not on file     Frequency of Social Gatherings with Friends and Family: Twice a week     Attends [employer] Services: Not on file     Active Member of Clubs or Organizations: Not on file     Attends Club or Organization Meetings: Not on file     Marital Status: Not on file   Interpersonal Safety: Low Risk  (11/13/2024)    Interpersonal Safety     Do you feel physically and emotionally safe where you currently live?: Yes     Within the past 12 months, have you been hit, slapped, kicked or otherwise physically hurt by someone?: No     Within the past 12 months, have you been humiliated or emotionally abused in other ways by your partner or ex-partner?: No   Housing Stability: Low Risk  (11/9/2024)    Housing Stability     Do you have housing? : Yes     Are you worried about losing your housing?: No       Family History     Family History   Problem Relation Age of Onset    Ovarian Cancer Mother     Benign prostatic hyperplasia Father     Skin Cancer Father         Not Melanoma    Kidney failure Father     Glaucoma Sister     Glaucoma Sister     Skin Cancer Brother     Glaucoma Brother        ROS     12 ROS completed, pertinent positive and negative in HPI    Physical Exam   /70 (BP Location: Left arm, Patient Position: Sitting, Cuff Size: Adult Regular)   Pulse 77   Resp 16   Wt 62.1 kg (137 lb)   SpO2 98%   BMI 23.68 kg/m       GENERAL: alert and no distress  EYES: Eyes grossly normal to inspection.  No discharge or erythema, or obvious scleral/conjunctival abnormalities.  RESP: No audible wheeze, cough, or visible cyanosis.    SKIN: Visible skin clear. No significant rash, abnormal pigmentation or  "lesions.  NEURO: Cranial nerves grossly intact.  Mentation and speech appropriate for age.  PSYCH: Appropriate affect, tone, and pace of words     Labs/Imaging     Pertinent Labs were reviewed and updated in EPIC and discussed briefly.  Radiology Results were  reviewed and updated in EPIC and discussed briefly.    Summary of recent findings:   Lab Results   Component Value Date    A1C 5.3 11/14/2024    A1C 5.5 08/10/2023       TSH   Date Value Ref Range Status   06/30/2020 0.95 0.30 - 5.00 uIU/mL Final       Creatinine   Date Value Ref Range Status   11/14/2024 0.91 0.67 - 1.17 mg/dL Final       Recent Labs   Lab Test 11/14/24  0920 08/10/23  0954   CHOL 214* 194    82   LDL 95 99   TRIG 50 63       No results found for: \"[id number]\", \"[id number]\", \"[id number]\"    I personally reviewed the patient's outside records from [facility] EMR and Care Everywhere. Summary of pertinent findings in [facility].    Impression / Plan     1. Severe osteoporosis  2. L3 compression fracture/fragility fracture  We reviewed his diagnosis and approach to management. His risk factor for bone loss include age and alcohol intake. We discussed cutting  down on alcohol intake, adequate ca and vit D intake and weight bearing exercises. Given diagnosis of fragility fracture with L3 compression fracture on 9/2024, we discussed the indication for anabolic vs antiresorptive medications . We discussed PTH analogs vs evenity, side effects and safety profile. He prefers PTH analog. We reviewed data on osteosarcoma in rats. I would like to check labs below and if normal I will sedn prescription to his pharmacy.    3. HTN  He had high BP reading today, he is on lisinopril 20 mg daily. Could be related to anxiety. I advised him to check it at home and reach out to PCP if still elevated.       Test and/or medications prescribed today:  Orders Placed This Encounter   Procedures    25 Hydroxyvitamin D2 and D3    Albumin level    Calcium    Parathyroid Hormone " Intact    Magnesium    Phosphorus    Creatinine         Follow up: 1 year, earlier if abnormal labs.     The longitudinal plan of care for the diagnosis(es)/condition(s) as documented were addressed during this visit. Due to the added complexity in care, I will continue to support Adalberto in the subsequent management and with ongoing continuity of care.       Patti Humphries MD  Endocrinology, Diabetes and Metabolism  [facility]   60 minutes spent on the date of the encounter doing chart review, history and exam, documentation and further activities per the note

## 2025-06-17 NOTE — PATIENT INSTRUCTIONS
CALCIUM Recommendation 5073-4847 mg/day in divided dose of no more than 500 mg/dose. This includes what is in your food and also what is in any supplements you are taking.      Dietary sources of calcium: These also contain vitamin D  Milk / buttermilk              8 oz            300 mg  Dry milk powder       5 Tbsp             300 mg  Yogurt                          1 cup           400 mg  Ice cream   1/2 cup          100 mg  Hard cheese                     1.5 oz          300 mg  Cottage cheese                1/2 cup        65 mg  Spinach, cooked  1 cup  240 mg  Tofu, soft regular           4 oz          120 to 390 mg  Sardines                       3 oz               370 mg  Mackerel canned         3 oz                250 mg  Canned salmon with bones 3 oz        170-210 mg  Calcium fortified cereals are available  SILK soy and almond milk products are calcium fortified  Orange juice  Fortified with Calcium       8 oz            300 mg  Low fat dairy sources are recommended      Recommended exercise goals:    30 minutes of moderate exercise on most days of the week .  Weight bearing exercise (ie, walking, jogging, hiking, dancing)    Strength training 2 or more times/week in addition to other weight -being exercise.  Strength training -- uses weight or resistance beyond that seen in everyday activities -(pilates, weight training with free weights, weight machines or resistance bands)    OSTEOPOROSIS of the spine: avoid exercise machines that incorporate spinal flexion, trunk rotation, or forward bending   Specifically avoid abdominal exercisers, stationary bikes with moving handles, cross-country ski machines, rowing machines, and bicep curl machines  Spinal fractures: AVOID activities that place significant force on the spine such as horse back riding,  tennis, golf or bowling    Bone density DXA may be performed every 2 years.  It must be performed on the same machine for comparison.               Welcome to  the [facility] Endocrinology and Diabetes Clinics     Our Endocrinology Clinics are here to provide you with a team-based, collaborative approach in the diagnosis and treatment of patients with diabetes and endocrine disorders. The team is made up of Physicians, Physician Assistants, Certified Diabetes Educators, Registered Nurses, Medical Assistants, Emergency Medical Technicians, and many others, all of whom have the unified goal of providing our patients with high quality care.     Please see below for some helpful tips to best navigate and use the [facility] Endocrinology clinic:     [place] Respect: At [facility], we are committed to a respectful and safe space for all patients, visitors, and staff.  We believe that mutual respect between patients and their care team is the foundation of quality care.  It is our expectation that you will be treated with respect by your care team.  In turn, we ask that all communication with the care team (written and verbal) be respectful and free from profanity, threatening, or abusive language.  Disrespectful communication undermines our therapeutic relationship with you and may result in us being unable to continue to provide your care.    Refills: A provider must see you at least annually to prescribe and refill medications. This is to ensure your safety as well as meet insurance and compliance regulations.    Scheduling: Many of our Providers offer both in-person or video visits. Please call to schedule any needed follow ups as soon as possible because our provider schedules fill up very quickly. Our care team has the right to require an in-person visit when they believe that it is medically necessary. Please remember that for any virtual visits, you must be in the [facility] at the time of the visit, otherwise we are unable to see you and you will need to be rescheduled.    Missed Appointments: If you need to cancel or miss your scheduled  appointment, please call the clinic at [phone] to reschedule.  Please note if you repeatedly miss appointments or repeatedly miss appointments without calling to inform us ahead of time (no-show), the clinic may elect to not allow you to reschedule without speaking to a manager, may require a Partnership In Care Agreement prior to rescheduling, or could result in you no longer being able to receive care from the clinic. Providing the clinic with timely notification if you have to miss an appointment, allows us to better serve the needs of all of our patients.    Primary Care Provider: Our Endocrinologists are Specialists in their field. We expect you to have a Primary Care Provider established to handle any needs outside of your diabetes and endocrine care.  We would be happy to assist you find a Primary Care Provider, if you do not have one.    Tizaro: Tizaro is a wonderful resource that allows you access to your Care Team via online or the tomeka. Please ask a member of the team if you would like help creating an account. Please note that it may take up to 2 business days for a response. Tizaro messages are not reviewed on weekends or after business hours.  Emergent or urgent care needs should never be communicated via Tizaro.  If you experience a medical emergency call 911 or go to the nearest emergency room.    Labs: It is recommended that you stay within the [facility] for labs but you are welcome to obtain ordered labs (with some exceptions) from any location of your choice as long as they are able to complete and process the needed labs. If you need us to fax orders to your preferred lab, please provide us the name and fax number of the lab you would like to go to so we can fax the orders. If your labs are drawn outside of the [facility], please have them fax the results to [phone] ([place]) or [phone] ([place]) or via [facility]. It is your responsibility  to ensure that outside lab results are sent to us.    We look forward to working with you. Please do not hesitate to reach out with any questions.    Thank you,    The Endocrine Team    [facility] Address:   [place] Address:     [place] [address]    Phone: [phone]  Fax: [phone]   [address] [place] [address]    Phone: [phone]  Fax: [phone]     [employer] Cost Estimate Phone Number: [phone]    General Lab and Imaging Scheduling Phone Number: [phone]      If you are interested in exploring research opportunities in the Division of Diabetes, Endocrinology and Metabolism and within the [facility] you are welcome to view the following QR codes by scanning them using your phone's camera to access a link to more information.  Participating in a research study is voluntary. Your decision whether or not to participate will not affect your current or future relations with the [facility] or [facility]. Current available studies are:     Type 1 Diabetes  Adults     Pediatrics     Type 2 Diabetes  Weight Loss - People Treated with Diet or Metformin Only     Pediatrics and Young Adults

## 2025-06-17 NOTE — LETTER
6/17/2025      Nate North  [address] [facility] [place] [address]      Dear Colleague,    Thank you for referring your patient, Nate North, to the [facility]. Please see a copy of my visit note below.    Endocrinology Clinic Visit 6/17/2025    NAME:  Nate North  PCP:  Juan Wu  MRN:  [id number]  Reason for Consult:  osteoporosis   Requesting Provider:  Delicia Cook    Chief Complaint     Chief Complaint   Patient presents with    New Patient     osteoporosis       History of Present Illness     Nate North is a 74 year old male who is seen in clinic for osteoporosis. His PMH is also significant for HTN, anxiety/depression and chronic back pain.     He reported back pain after falling from a 2 step ladder along with worsening neck pain.   The patient had a DXA scan on 10/2024 which showed:     DXA RESULTS  -Lumbar Spine: L1-L4 (L3): BMD: 1.103 g/cm2. T-score: -0.9. Z-score: -0.3.  -RIGHT Hip Total: BMD: 0.837 g/cm2. T-score: -1.8. Z-score: -0.9.  -RIGHT Hip Femoral neck: BMD: 0.719 g/cm2. T-score: -2.7. Z-score: -1.3.  -LEFT Hip Total: BMD: 0.848 g/cm2. T-score: -1.8. Z-score: -0.9.  -LEFT Hip Femoral neck: BMD: 0.767 g/cm2. T-score: -2.3. Z-score: -0.9.      Calcium intake:   Dietary: cheese everyday, milk with cereal, no regular yogurt or ice cream   Supplements: no    Vitamin D intake:   Supplements: no   No vitamin D checked.     Osteoporosis Risk factors:   Previous fractures:   9/2024 lumbar xray : Bones appear demineralized which limits evaluation for fractures. Presumed small 12th ribs. Mild wedge compression deformity of the L3 vertebral body which could represent fracture.   MRI 10/2024: Recent appearing L3 inferior endplate compression fracture with mild to moderate vertebral height loss.    Ankle fracture in the 1960s.     Family history of fragility fracture in parent: no  Current smoking: no, quit at age 19  Steroid Use: no  Rheumatoid  arthritis:  no  Alcohol (3 or more units per day): yes, everyday 2 glasses of wine and couple oz of bourbon  Other medications known to affect BMD: no   Reported loss of height: 2 inches.   Tendency for falls: no   GI history: Malabsorption (IBD, Celiac, gastric bypass ): no   History of kidney stones: no  History of thyroid disease: no  Physical activity: active at home, doing PT. No regular routine.   No radiation exposure  No dental procedure planned. He gets regular dental check up.     ROS significant for diffuse non specific joint pain.     Problem List     Patient Active Problem List   Diagnosis    Degenerative cervical disc    BPH (benign prostatic hyperplasia)    Prediabetes    Actinic keratitis    Hypertension    Memory loss    Mood disorder    Osteoporosis    Low back pain        Medications     Current Outpatient Medications   Medication Sig Dispense Refill    acetaminophen (TYLENOL) 325 MG tablet Take 650 mg by mouth daily as needed for mild pain.      lisinopril (ZESTRIL) 20 MG tablet TAKE ONE TABLET BY MOUTH EVERY DAY 90 tablet 3    loperamide (IMODIUM) 2 MG capsule Take 2 mg by mouth 4 times daily as needed for diarrhea      sertraline (ZOLOFT) 50 MG tablet TAKE ONE TABLET BY MOUTH EVERY DAY 90 tablet 1    tiZANidine (ZANAFLEX) 2 MG tablet TAKE ONE TO TWO TABLETS BY MOUTH AT BEDTIME AS NEEDED FOR MUSCLE SPASMS 60 tablet 1     Current Facility-Administered Medications   Medication Dose Route Frequency Provider Last Rate Last Admin    lidocaine 1% with EPINEPHrine 1:100,000 injection 3 mL  3 mL Intradermal Once             Allergies     No Known Allergies    Medical / Surgical History     Past Medical History:   Diagnosis Date    Anxiety     Work related. Minor attacks lasting 10-15 seconds. Substantial improvement since half-way. Sense of disconnectedness      Past Surgical History:   Procedure Laterality Date    ARTHROSCOPY SHOULDER ROTATOR CUFF REPAIR Right 2007    INGUINAL HERNIA REPAIR Right     VASECTOMY               Social History     Social History     Socioeconomic History    Marital status:      Spouse name: Not on file    Number of children: 1    Years of education: Not on file    Highest education level: Not on file   Occupational History    Occupation: Various     Employer: [employer]     Comment: Retired    Occupation: [employer]     Comment: Retired   Tobacco Use    Smoking status: Former     Current packs/day: 0.00     Average packs/day: 0.5 packs/day for 5.0 years (2.5 ttl pk-yrs)     Types: Cigars, Cigarettes     Start date: 1958     Quit date: 1963     Years since quittin.1     Passive exposure: Never    Smokeless tobacco: Never    Tobacco comments:     smoked a few years in high school   Vaping Use    Vaping status: Never Used   Substance and Sexual Activity    Alcohol use: Yes     Alcohol/week: 7.0 standard drinks of alcohol     Types: 7 Standard drinks or equivalent per week     Comment: Daily    Drug use: No    Sexual activity: Yes     Partners: Female   Other Topics Concern    Parent/sibling w/ CABG, MI or angioplasty before 65F 55M? Not Asked   Social History Narrative    Diet-healthy            Exercise-walking 3-4x week     Social Drivers of Health     Financial Resource Strain: Low Risk  (2024)    Financial Resource Strain     Within the past 12 months, have you or your family members you live with been unable to get utilities (heat, electricity) when it was really needed?: No   Food Insecurity: Low Risk  (2024)    Food Insecurity     Within the past 12 months, did you worry that your food would run out before you got money to buy more?: No     Within the past 12 months, did the food you bought just not last and you didn t have money to get more?: No   Transportation Needs: Low Risk  (2024)    Transportation Needs     Within the past 12 months, has lack of transportation kept you from medical appointments, getting your medicines, non-medical meetings or  appointments, work, or from getting things that you need?: No   Physical Activity: Insufficiently Active (11/9/2024)    Exercise Vital Sign     Days of Exercise per Week: 2 days     Minutes of Exercise per Session: 10 min   Stress: No Stress Concern Present (11/9/2024)    [employer] [place] [employer] - Occupational Stress Questionnaire     Feeling of Stress : Only a little   Social Connections: Unknown (11/9/2024)    Social Connection and Isolation Panel [NHANES]     Frequency of Communication with Friends and Family: Not on file     Frequency of Social Gatherings with Friends and Family: Twice a week     Attends Taoist Services: Not on file     Active Member of Clubs or Organizations: Not on file     Attends Club or Organization Meetings: Not on file     Marital Status: Not on file   Interpersonal Safety: Low Risk  (11/13/2024)    Interpersonal Safety     Do you feel physically and emotionally safe where you currently live?: Yes     Within the past 12 months, have you been hit, slapped, kicked or otherwise physically hurt by someone?: No     Within the past 12 months, have you been humiliated or emotionally abused in other ways by your partner or ex-partner?: No   Housing Stability: Low Risk  (11/9/2024)    Housing Stability     Do you have housing? : Yes     Are you worried about losing your housing?: No       Family History     Family History   Problem Relation Age of Onset    Ovarian Cancer Mother     Benign prostatic hyperplasia Father     Skin Cancer Father         Not Melanoma    Kidney failure Father     Glaucoma Sister     Glaucoma Sister     Skin Cancer Brother     Glaucoma Brother        ROS     12 ROS completed, pertinent positive and negative in HPI    Physical Exam   /70 (BP Location: Left arm, Patient Position: Sitting, Cuff Size: Adult Regular)   Pulse 77   Resp 16   Wt 62.1 kg (137 lb)   SpO2 98%   BMI 23.68 kg/m       GENERAL: alert and no distress  EYES: Eyes grossly  "normal to inspection.  No discharge or erythema, or obvious scleral/conjunctival abnormalities.  RESP: No audible wheeze, cough, or visible cyanosis.    SKIN: Visible skin clear. No significant rash, abnormal pigmentation or lesions.  NEURO: Cranial nerves grossly intact.  Mentation and speech appropriate for age.  PSYCH: Appropriate affect, tone, and pace of words     Labs/Imaging     Pertinent Labs were reviewed and updated in EPIC and discussed briefly.  Radiology Results were  reviewed and updated in EPIC and discussed briefly.    Summary of recent findings:   Lab Results   Component Value Date    A1C 5.3 11/14/2024    A1C 5.5 08/10/2023       TSH   Date Value Ref Range Status   06/30/2020 0.95 0.30 - 5.00 uIU/mL Final       Creatinine   Date Value Ref Range Status   11/14/2024 0.91 0.67 - 1.17 mg/dL Final       Recent Labs   Lab Test 11/14/24  0920 08/10/23  0954   CHOL 214* 194    82   LDL 95 99   TRIG 50 63       No results found for: \"[id number]\", \"[id number]\", \"[id number]\"    I personally reviewed the patient's outside records from [facility] EMR and Care Everywhere. Summary of pertinent findings in HPI.    Impression / Plan     1. Severe osteoporosis  2. L3 compression fracture/fragility fracture  We reviewed his diagnosis and approach to management. His risk factor for bone loss include age and alcohol intake. We discussed cutting  down on alcohol intake, adequate ca and vit D intake and weight bearing exercises. Given diagnosis of fragility fracture with L3 compression fracture on 9/2024, we discussed the indication for anabolic vs antiresorptive medications . We discussed PTH analogs vs evenity, side effects and safety profile. He prefers PTH analog. We reviewed data on osteosarcoma in rats. I would like to check labs below and if normal I will sedn prescription to his pharmacy.    3. HTN  He had high BP reading today, he is on lisinopril 20 mg daily. Could be related to anxiety. I advised him to " check it at home and reach out to PCP if still elevated.       Test and/or medications prescribed today:  Orders Placed This Encounter   Procedures    25 Hydroxyvitamin D2 and D3    Albumin level    Calcium    Parathyroid Hormone Intact    Magnesium    Phosphorus    Creatinine       Follow up: 1 year, earlier if abnormal labs.     The longitudinal plan of care for the diagnosis(es)/condition(s) as documented were addressed during this visit. Due to the added complexity in care, I will continue to support Adalberto in the subsequent management and with ongoing continuity of care.       Patti Humphries MD  Endocrinology, Diabetes and Metabolism  [facility]   60 minutes spent on the date of the encounter doing chart review, history and exam, documentation and further activities per the note     Again, thank you for allowing me to participate in the care of your patient.        Sincerely,    Patti Humphries MD  Electronically signed

## 2025-06-17 NOTE — NURSING NOTE
Nate North's goals for this visit include:   Chief Complaint   Patient presents with    New Patient     osteoporosis       He requests these members of his care team be copied on today's visit information: charles    PCP: Juan Wu    Referring Provider:  Delicia Cook PA-C  [address] [place] [id number]    BP (!) 160/87   Pulse 105   Resp 16   Wt 62.1 kg (137 lb)   SpO2 98%   BMI 23.68 kg/m      Do you need any medication refills at today's visit? None    Antoni Treviño, EMT

## 2025-06-18 PROBLEM — M81.0 OSTEOPOROSIS: Status: ACTIVE | Noted: 2024-11-13

## 2025-06-18 LAB — PTH-INTACT SERPL-MCNC: 22 PG/ML (ref 15–65)

## 2025-06-23 LAB
DEPRECATED CALCIDIOL+CALCIFEROL SERPL-MC: <30 UG/L (ref 20–75)
VITAMIN D2 SERPL-MCNC: <5 UG/L
VITAMIN D3 SERPL-MCNC: 25 UG/L

## 2025-06-27 ENCOUNTER — TELEPHONE (OUTPATIENT)
Dept: ENDOCRINOLOGY | Facility: CLINIC | Age: 75
End: 2025-06-27

## 2025-06-27 ENCOUNTER — RESULTS FOLLOW-UP (OUTPATIENT)
Dept: ENDOCRINOLOGY | Facility: CLINIC | Age: 75
End: 2025-06-27

## 2025-06-27 DIAGNOSIS — M80.00XD AGE-RELATED OSTEOPOROSIS WITH CURRENT PATHOLOGICAL FRACTURE WITH ROUTINE HEALING, SUBSEQUENT ENCOUNTER: Primary | ICD-10-CM

## 2025-06-27 DIAGNOSIS — M80.88XD OSTEOPOROTIC COMPRESSION FRACTURE OF VERTEBRA WITH ROUTINE HEALING, SUBSEQUENT ENCOUNTER: ICD-10-CM

## 2025-06-27 DIAGNOSIS — M80.00XD AGE-RELATED OSTEOPOROSIS WITH CURRENT PATHOLOGICAL FRACTURE WITH ROUTINE HEALING, SUBSEQUENT ENCOUNTER: ICD-10-CM

## 2025-06-27 RX ORDER — TERIPARATIDE 250 UG/ML
20 INJECTION, SOLUTION SUBCUTANEOUS DAILY
Qty: 2.24 ML | Refills: 1 | OUTPATIENT
Start: 2025-06-27 | End: 2025-07-18

## 2025-07-03 ENCOUNTER — MYC MEDICAL ADVICE (OUTPATIENT)
Dept: FAMILY MEDICINE | Facility: CLINIC | Age: 75
End: 2025-07-03
Payer: COMMERCIAL

## 2025-07-03 NOTE — TELEPHONE ENCOUNTER
Writer encouraged patient to keep a record of his blood pressures so Dr. Zimmerman can know and then decide to treat.      OV with Endo - 6/17/2025  3. HTN  He had high BP reading today, he is on lisinopril 20 mg daily. Could be related to anxiety. I advised him to check it at home and reach out to PCP if still elevated.   BP: 160/70 and 127/70 with recheck      Routing to PCP to advise

## 2025-07-18 RX ORDER — TERIPARATIDE 250 UG/ML
20 INJECTION, SOLUTION SUBCUTANEOUS DAILY
Qty: 2.24 ML | Refills: 1 | Status: SHIPPED | OUTPATIENT
Start: 2025-07-18